# Patient Record
Sex: FEMALE | Race: WHITE | NOT HISPANIC OR LATINO | Employment: OTHER | URBAN - METROPOLITAN AREA
[De-identification: names, ages, dates, MRNs, and addresses within clinical notes are randomized per-mention and may not be internally consistent; named-entity substitution may affect disease eponyms.]

---

## 2017-01-05 ENCOUNTER — TELEPHONE (OUTPATIENT)
Dept: INTERNAL MEDICINE | Facility: CLINIC | Age: 62
End: 2017-01-05

## 2017-01-05 NOTE — TELEPHONE ENCOUNTER
----- Message from Destinee Gallegos sent at 1/5/2017  9:18 AM EST -----  Concerning pts MRI, this was denied by insurance. How would you like me to proceed with this?

## 2017-01-17 ENCOUNTER — APPOINTMENT (OUTPATIENT)
Dept: MRI IMAGING | Facility: HOSPITAL | Age: 62
End: 2017-01-17

## 2017-01-24 ENCOUNTER — TRANSCRIBE ORDERS (OUTPATIENT)
Dept: MAMMOGRAPHY | Facility: HOSPITAL | Age: 62
End: 2017-01-24

## 2017-01-24 DIAGNOSIS — Z12.31 VISIT FOR SCREENING MAMMOGRAM: Primary | ICD-10-CM

## 2017-03-02 ENCOUNTER — APPOINTMENT (OUTPATIENT)
Dept: MAMMOGRAPHY | Facility: HOSPITAL | Age: 62
End: 2017-03-02
Attending: OBSTETRICS & GYNECOLOGY

## 2017-03-06 ENCOUNTER — TRANSCRIBE ORDERS (OUTPATIENT)
Dept: MAMMOGRAPHY | Facility: HOSPITAL | Age: 62
End: 2017-03-06

## 2017-03-06 ENCOUNTER — TRANSCRIBE ORDERS (OUTPATIENT)
Dept: PHARMACY | Facility: HOSPITAL | Age: 62
End: 2017-03-06

## 2017-03-06 DIAGNOSIS — Z13.820 SCREENING FOR OSTEOPOROSIS: Primary | ICD-10-CM

## 2017-03-13 ENCOUNTER — HOSPITAL ENCOUNTER (OUTPATIENT)
Dept: MAMMOGRAPHY | Facility: HOSPITAL | Age: 62
Discharge: HOME | End: 2017-03-13
Attending: OBSTETRICS & GYNECOLOGY | Admitting: OBSTETRICS & GYNECOLOGY

## 2017-03-13 ENCOUNTER — HOSPITAL ENCOUNTER (OUTPATIENT)
Dept: BONE DENSITY | Facility: HOSPITAL | Age: 62
Discharge: HOME | End: 2017-03-13

## 2017-03-13 DIAGNOSIS — Z13.820 SCREENING FOR OSTEOPOROSIS: ICD-10-CM

## 2017-03-13 DIAGNOSIS — Z12.31 VISIT FOR SCREENING MAMMOGRAM: ICD-10-CM

## 2017-03-13 PROCEDURE — 77080 DXA BONE DENSITY AXIAL: CPT | Performed by: RADIOLOGY

## 2017-03-13 PROCEDURE — G0202 SCR MAMMO BI INCL CAD: HCPCS

## 2017-03-13 PROCEDURE — 77063 BREAST TOMOSYNTHESIS BI: CPT

## 2017-03-13 PROCEDURE — 77080 DXA BONE DENSITY AXIAL: CPT

## 2017-03-13 PROCEDURE — 77067 SCR MAMMO BI INCL CAD: CPT | Performed by: RADIOLOGY

## 2017-03-13 PROCEDURE — 77063 BREAST TOMOSYNTHESIS BI: CPT | Performed by: RADIOLOGY

## 2017-03-21 ENCOUNTER — OFFICE VISIT (OUTPATIENT)
Dept: INTERNAL MEDICINE | Facility: CLINIC | Age: 62
End: 2017-03-21

## 2017-03-21 VITALS
TEMPERATURE: 98.3 F | HEART RATE: 68 BPM | BODY MASS INDEX: 28.48 KG/M2 | WEIGHT: 141 LBS | SYSTOLIC BLOOD PRESSURE: 132 MMHG | RESPIRATION RATE: 18 BRPM | DIASTOLIC BLOOD PRESSURE: 78 MMHG

## 2017-03-21 DIAGNOSIS — H65.04 RECURRENT ACUTE SEROUS OTITIS MEDIA OF RIGHT EAR: ICD-10-CM

## 2017-03-21 DIAGNOSIS — I10 BENIGN ESSENTIAL HYPERTENSION: Primary | ICD-10-CM

## 2017-03-21 LAB
ANION GAP SERPL CALCULATED.3IONS-SCNC: 7 MMOL/L (ref 3–11)
BUN BLD-MCNC: 14 MG/DL (ref 9–23)
BUN/CREAT SERPL: 23.3 (ref 7–25)
CALCIUM SPEC-SCNC: 9.9 MG/DL (ref 8.7–10.4)
CHLORIDE SERPL-SCNC: 98 MMOL/L (ref 99–109)
CO2 SERPL-SCNC: 34 MMOL/L (ref 20–31)
CREAT BLD-MCNC: 0.6 MG/DL (ref 0.6–1.3)
GFR SERPL CREATININE-BSD FRML MDRD: 102 ML/MIN/1.73
GLUCOSE BLD-MCNC: 112 MG/DL (ref 70–100)
POTASSIUM BLD-SCNC: 3.5 MMOL/L (ref 3.5–5.5)
SODIUM BLD-SCNC: 139 MMOL/L (ref 132–146)

## 2017-03-21 PROCEDURE — 80048 BASIC METABOLIC PNL TOTAL CA: CPT | Performed by: INTERNAL MEDICINE

## 2017-03-21 PROCEDURE — 99213 OFFICE O/P EST LOW 20 MIN: CPT | Performed by: INTERNAL MEDICINE

## 2017-03-21 PROCEDURE — 36415 COLL VENOUS BLD VENIPUNCTURE: CPT | Performed by: INTERNAL MEDICINE

## 2017-03-21 NOTE — PROGRESS NOTES
Chief Complaint   Patient presents with   • Follow-up     6 MONTH       History of Present Illness      The patient presents for a follow-up related to hypertension. The patient reports that she has had no headaches, chest pain, dyspnea, edema, syncope, blurred vision or palpitations. She states that she is taking her medication as prescribed. She is not having medication side effects.    The patient presents with a 2 week history of right ear pain. There has not been drainage associated with the pain. She does not have wheezing, a cough, a fever, facial pain, eye drainage, nasal discharge, chills, a rash, nausea, vomiting, diarrhea, congestion, decreased appetite, dizziness or a sore throat. The patient does not smoke. The patient has used steroid nasal sprays for treatment of this illness.   The steroid nasal sprays have improved the symptoms.    Review of Systems    GENERAL- Denies Unexplained Weight Loss, Sweats or Fatigue.    HEENT- Reports: Ear Pain. Denies: Eye Pain, Decreased Vision, Sinus Pain or Decreased Hearing.    PULMONARY- Denies Sputum Production.    Medications      Current Outpatient Prescriptions:   •  BIOTIN PO, Take 1 tablet by mouth daily., Disp: , Rfl:   •  estrogen, conjugated,-medroxyprogesterone (PREMPRO) 0.45-1.5 MG per tablet, Take 1 tablet by mouth daily., Disp: , Rfl:   •  JUBLIA 10 % solution, Apply 1 application topically daily., Disp: , Rfl: 3  •  losartan-hydrochlorothiazide (HYZAAR) 100-25 MG per tablet, TAKE ONE TABLET BY MOUTH DAILY, Disp: 30 tablet, Rfl: 5  •  pantoprazole (PROTONIX) 40 MG EC tablet, TAKE ONE TABLET BY MOUTH DAILY, Disp: 30 tablet, Rfl: 5     Allergies    No Known Allergies    Problem List    Patient Active Problem List   Diagnosis   • Benign essential hypertension   • Hearing loss   • Benign paroxysmal positional vertigo   • Influenza       Physical Examination    Visit Vitals   • /78 (BP Location: Left arm, Patient Position: Sitting, Cuff Size: Adult)    • Pulse 68   • Temp 98.3 °F (36.8 °C) (Temporal Artery )   • Resp 18   • Wt 141 lb (64 kg)   • BMI 28.48 kg/m2       HEENT: Head- Normocephalic Atraumatic. Facies- Within normal limits. Pinnas- Normal texture and shape bilaterally. Canals- Normal bilaterally. TMs- Left TM is normal; The right TM is noted to have serous fluid. Nares- Patent bilaterally. Nasal Septum- is normal. There is no tenderness to palpation over the frontal or maxillary sinuses. Lids- Normal bilaterally. Conjunctiva- Clear bilaterally. Sclera- Anicteric bilaterally. Oropharynx- Moist with no lesions. Tonsils- No enlargement, erythema or exudate.    Neck: Thyroid- non enlarged, symmetric and has no nodules. No bruits are detected.    Lymph Nodes: Cervical- no enlarged lymph nodes noted. Clavicular- Deferred. Axillary- Deferred. Inguinal- Deferred.    Lungs: Auscultation- Clear to auscultation bilaterally. There are no retractions, clubbing or cyanosis. The Expiratory to Inspiratory ratio is equal.    Cardiovascular: Heart- Normal Rate with Regular rhythm and no murmurs.    Impression and Assessment    Hypertension.     Serous Otitis Media.     Plan    Hypertension Plan: The current plan was continued.    Serous Otitis Media Plan: She was told to use saline nasal drops for relief of congestion. Continue the current medication regimen.    Helga was seen today for follow-up.    Diagnoses and all orders for this visit:    Benign essential hypertension  -     Basic Metabolic Panel    Recurrent acute serous otitis media of right ear        Return to Office    The patient was instructed to return for follow-up in 6 months. Next Visit: Physical.    The patient was instructed to return sooner if the condition changes, worsens, or doesn't resolve.

## 2017-04-04 ENCOUNTER — HOSPITAL ENCOUNTER (OUTPATIENT)
Dept: MAMMOGRAPHY | Facility: HOSPITAL | Age: 62
Discharge: HOME | End: 2017-04-04
Admitting: OBSTETRICS & GYNECOLOGY

## 2017-04-04 DIAGNOSIS — R92.8 ABNORMAL MAMMOGRAM: ICD-10-CM

## 2017-04-04 PROCEDURE — G0206 DX MAMMO INCL CAD UNI: HCPCS

## 2017-04-04 PROCEDURE — 77065 DX MAMMO INCL CAD UNI: CPT | Performed by: RADIOLOGY

## 2017-04-04 PROCEDURE — 77061 BREAST TOMOSYNTHESIS UNI: CPT | Performed by: RADIOLOGY

## 2017-04-04 PROCEDURE — G0279 TOMOSYNTHESIS, MAMMO: HCPCS

## 2017-05-03 DIAGNOSIS — I10 BENIGN ESSENTIAL HYPERTENSION: ICD-10-CM

## 2017-05-03 RX ORDER — LOSARTAN POTASSIUM AND HYDROCHLOROTHIAZIDE 25; 100 MG/1; MG/1
TABLET ORAL
Qty: 30 TABLET | Refills: 4 | Status: SHIPPED | OUTPATIENT
Start: 2017-05-03 | End: 2017-10-03 | Stop reason: SDUPTHER

## 2017-07-05 RX ORDER — PANTOPRAZOLE SODIUM 40 MG/1
TABLET, DELAYED RELEASE ORAL
Qty: 30 TABLET | Refills: 5 | Status: SHIPPED | OUTPATIENT
Start: 2017-07-05 | End: 2017-12-30 | Stop reason: SDUPTHER

## 2017-09-05 ENCOUNTER — TELEPHONE (OUTPATIENT)
Dept: INTERNAL MEDICINE | Facility: CLINIC | Age: 62
End: 2017-09-05

## 2017-09-05 NOTE — TELEPHONE ENCOUNTER
----- Message from Venita Flowers sent at 8/29/2017  3:32 PM EDT -----  PT CALLED AND STATED SHE IS GOING TO [place] AND [facility] IN JANUARY AND WANTED TO KNOW WHAT VACCINES DOES SHE NEED.     SHE CAN BE REACHED [phone]

## 2017-09-05 NOTE — TELEPHONE ENCOUNTER
She will need Hep A, Hep B, Typhoid, Tdap vaccinations and malaria prophylaxis.  Yellow fever vaccine is also recommended for most areas of [place].  To receive this, she will need to go to a certified travel health center.  Please give her the name of the travel medicine clinic in [place] and a copy of the vaccinations that she has received here.  Rey Desouza MD  4:22 PM  09/05/17

## 2017-09-07 NOTE — TELEPHONE ENCOUNTER
I spoke to pt and told her about travel clinic. She said she is coming into office in October and will get this info to schedule appt at travel clinic then. She was not able to write anything down now, but said she does not need this until January.      Travel clinic at  p. [phone]  Address: [facility] [address]  [address]

## 2017-10-03 DIAGNOSIS — I10 BENIGN ESSENTIAL HYPERTENSION: ICD-10-CM

## 2017-10-04 RX ORDER — LOSARTAN POTASSIUM AND HYDROCHLOROTHIAZIDE 25; 100 MG/1; MG/1
TABLET ORAL
Qty: 30 TABLET | Refills: 3 | Status: SHIPPED | OUTPATIENT
Start: 2017-10-04 | End: 2018-01-04 | Stop reason: SDUPTHER

## 2017-10-12 ENCOUNTER — OFFICE VISIT (OUTPATIENT)
Dept: INTERNAL MEDICINE | Facility: CLINIC | Age: 62
End: 2017-10-12

## 2017-10-12 VITALS
SYSTOLIC BLOOD PRESSURE: 134 MMHG | BODY MASS INDEX: 27.21 KG/M2 | HEART RATE: 60 BPM | HEIGHT: 59 IN | RESPIRATION RATE: 16 BRPM | WEIGHT: 135 LBS | DIASTOLIC BLOOD PRESSURE: 84 MMHG | TEMPERATURE: 97.9 F

## 2017-10-12 DIAGNOSIS — Z23 IMMUNIZATION DUE: ICD-10-CM

## 2017-10-12 DIAGNOSIS — Z00.00 PHYSICAL EXAM: Primary | ICD-10-CM

## 2017-10-12 DIAGNOSIS — I10 ESSENTIAL HYPERTENSION: ICD-10-CM

## 2017-10-12 DIAGNOSIS — K21.9 GASTROESOPHAGEAL REFLUX DISEASE WITHOUT ESOPHAGITIS: ICD-10-CM

## 2017-10-12 LAB
ALBUMIN SERPL-MCNC: 4.2 G/DL (ref 3.2–4.8)
ALBUMIN/GLOB SERPL: 1.7 G/DL (ref 1.5–2.5)
ALP SERPL-CCNC: 55 U/L (ref 25–100)
ALT SERPL W P-5'-P-CCNC: 28 U/L (ref 7–40)
ANION GAP SERPL CALCULATED.3IONS-SCNC: 7 MMOL/L (ref 3–11)
ARTICHOKE IGE QN: 66 MG/DL (ref 0–130)
AST SERPL-CCNC: 31 U/L (ref 0–33)
BASOPHILS # BLD AUTO: 0.03 10*3/MM3 (ref 0–0.2)
BASOPHILS NFR BLD AUTO: 0.6 % (ref 0–1)
BILIRUB BLD-MCNC: NEGATIVE MG/DL
BILIRUB SERPL-MCNC: 0.5 MG/DL (ref 0.3–1.2)
BUN BLD-MCNC: 7 MG/DL (ref 9–23)
BUN/CREAT SERPL: 11.7 (ref 7–25)
CALCIUM SPEC-SCNC: 9.5 MG/DL (ref 8.7–10.4)
CHLORIDE SERPL-SCNC: 90 MMOL/L (ref 99–109)
CHOLEST SERPL-MCNC: 143 MG/DL (ref 0–200)
CLARITY, POC: CLEAR
CO2 SERPL-SCNC: 33 MMOL/L (ref 20–31)
COLOR UR: YELLOW
CREAT BLD-MCNC: 0.6 MG/DL (ref 0.6–1.3)
DEPRECATED RDW RBC AUTO: 43.1 FL (ref 37–54)
EOSINOPHIL # BLD AUTO: 0.13 10*3/MM3 (ref 0–0.3)
EOSINOPHIL NFR BLD AUTO: 2.6 % (ref 0–3)
ERYTHROCYTE [DISTWIDTH] IN BLOOD BY AUTOMATED COUNT: 12.8 % (ref 11.3–14.5)
EXPIRATION DATE: ABNORMAL
GFR SERPL CREATININE-BSD FRML MDRD: 102 ML/MIN/1.73
GLOBULIN UR ELPH-MCNC: 2.5 GM/DL
GLUCOSE BLD-MCNC: 91 MG/DL (ref 70–100)
GLUCOSE UR STRIP-MCNC: NEGATIVE MG/DL
HCT VFR BLD AUTO: 40.4 % (ref 34.5–44)
HCV AB SER DONR QL: NORMAL
HDLC SERPL-MCNC: 64 MG/DL (ref 40–60)
HGB BLD-MCNC: 13.6 G/DL (ref 11.5–15.5)
IMM GRANULOCYTES # BLD: 0.02 10*3/MM3 (ref 0–0.03)
IMM GRANULOCYTES NFR BLD: 0.4 % (ref 0–0.6)
KETONES UR QL: ABNORMAL
LEUKOCYTE EST, POC: NEGATIVE
LYMPHOCYTES # BLD AUTO: 1.22 10*3/MM3 (ref 0.6–4.8)
LYMPHOCYTES NFR BLD AUTO: 24.6 % (ref 24–44)
Lab: ABNORMAL
MCH RBC QN AUTO: 30.9 PG (ref 27–31)
MCHC RBC AUTO-ENTMCNC: 33.7 G/DL (ref 32–36)
MCV RBC AUTO: 91.8 FL (ref 80–99)
MONOCYTES # BLD AUTO: 0.53 10*3/MM3 (ref 0–1)
MONOCYTES NFR BLD AUTO: 10.7 % (ref 0–12)
NEUTROPHILS # BLD AUTO: 3.03 10*3/MM3 (ref 1.5–8.3)
NEUTROPHILS NFR BLD AUTO: 61.1 % (ref 41–71)
NITRITE UR-MCNC: NEGATIVE MG/ML
PH UR: 6 [PH] (ref 5–8)
PLATELET # BLD AUTO: 286 10*3/MM3 (ref 150–450)
PMV BLD AUTO: 9.1 FL (ref 6–12)
POTASSIUM BLD-SCNC: 4.4 MMOL/L (ref 3.5–5.5)
PROT SERPL-MCNC: 6.7 G/DL (ref 5.7–8.2)
PROT UR STRIP-MCNC: NEGATIVE MG/DL
RBC # BLD AUTO: 4.4 10*6/MM3 (ref 3.89–5.14)
RBC # UR STRIP: NEGATIVE /UL
SODIUM BLD-SCNC: 130 MMOL/L (ref 132–146)
SP GR UR: 1.01 (ref 1–1.03)
TRIGL SERPL-MCNC: 41 MG/DL (ref 0–150)
UROBILINOGEN UR QL: NORMAL
WBC NRBC COR # BLD: 4.96 10*3/MM3 (ref 3.5–10.8)

## 2017-10-12 PROCEDURE — 86803 HEPATITIS C AB TEST: CPT | Performed by: INTERNAL MEDICINE

## 2017-10-12 PROCEDURE — 90746 HEPB VACCINE 3 DOSE ADULT IM: CPT | Performed by: INTERNAL MEDICINE

## 2017-10-12 PROCEDURE — 99396 PREV VISIT EST AGE 40-64: CPT | Performed by: INTERNAL MEDICINE

## 2017-10-12 PROCEDURE — 85025 COMPLETE CBC W/AUTO DIFF WBC: CPT | Performed by: INTERNAL MEDICINE

## 2017-10-12 PROCEDURE — 81003 URINALYSIS AUTO W/O SCOPE: CPT | Performed by: INTERNAL MEDICINE

## 2017-10-12 PROCEDURE — 80053 COMPREHEN METABOLIC PANEL: CPT | Performed by: INTERNAL MEDICINE

## 2017-10-12 PROCEDURE — 80061 LIPID PANEL: CPT | Performed by: INTERNAL MEDICINE

## 2017-10-12 PROCEDURE — 90471 IMMUNIZATION ADMIN: CPT | Performed by: INTERNAL MEDICINE

## 2017-10-12 PROCEDURE — 90632 HEPA VACCINE ADULT IM: CPT | Performed by: INTERNAL MEDICINE

## 2017-10-12 PROCEDURE — 36415 COLL VENOUS BLD VENIPUNCTURE: CPT | Performed by: INTERNAL MEDICINE

## 2017-10-12 PROCEDURE — 90472 IMMUNIZATION ADMIN EACH ADD: CPT | Performed by: INTERNAL MEDICINE

## 2017-10-12 NOTE — PROGRESS NOTES
Chief Complaint   Patient presents with   • Annual Exam       History of Present Illness      The patient presents for an established patient physical examination and health maintenance visit. The patient has had a colonoscopy.    The patient presents for a follow-up related to hypertension. The patient reports that she has had no headaches, chest pain, dyspnea, edema, syncope, blurred vision or palpitations. She states that she is taking her medication as prescribed. She is not having medication side effects. She checks her blood pressures at home. The home readings are normal.    The patient is on pantoprazole for her gastroesophageal reflux. The medication is taken on a regular basis and gives complete relief of the symptoms. She reports no abdominal pain, belching, diarrhea, dysphagia, early satiety, heartburn, hoarseness, nausea, odynophagia, rectal bleeding, vomiting or weight loss. The GERD has no known aggravating factors.    Review of Systems    GENERAL- Denies Fever, Chills, Sweats, Fatigue, Weakness or Malaise.    HEENT- Denies Eye Pain, Eye Drainage, Nasal Discharge, Sore Throat, Ear Pain, Ear Drainage, Alopecia, Decreased Vision, Sinus Pain, Nasal Congestion, Decreased Hearing, Visual Disturbance, Diplopia or Tinnitus.    NECK- Denies Decreased Range of Motion, Stiffness, Thyroid Nodules, Enlarged Lymph Nodes or Goiter.    CARDIOVASCULAR- Denies Claudication.    PULMONARY- Denies Wheezing, Sputum Production, Cough, Hemoptysis or Pleuritic Chest Pain.    GASTROINTESTINAL- Denies Constipation.    GENITOURINARY- Denies Dysuria, Hematuria, Urinary Frequency, Urinary Leakage, Pelvic Pain, Vaginal Prolapse, Vaginal Discharge, Dyspareunia or Abnormal Menses.    ENDOCRINE- Denies Cold Intolerance, Depression, Heat Intolerance, Memory Loss, Polydypsia, Polyphagia, Polyuria, Sleep Disturbance or Weight Gain.    NEUROLOGIC- Denies Seizures, Confusion or Excessive Daytime Sleepiness.    MUSCULOSKELETAL- Denies  "Joint Pain, Joint Stiffness, Decreased Range of Motion, Joint Swelling or Erythema of Joints.    INTEGUMENTARY- Denies Rash, Lumps, Sores, Itching, Dryness, Color Change, Changes in Hair, Brittle Nails, Discolored Nails, Thickened Nails or Growths.    Medications      Current Outpatient Prescriptions:   •  BIOTIN PO, Take 1 tablet by mouth daily., Disp: , Rfl:   •  Cholecalciferol (VITAMIN D-3 PO), Take 1 capsule by mouth 1 (One) Time Per Week. TAKES 50,000 UNITS WEEKLY, Disp: , Rfl:   •  estrogen, conjugated,-medroxyprogesterone (PREMPRO) 0.45-1.5 MG per tablet, Take 1 tablet by mouth daily., Disp: , Rfl:   •  JUBLIA 10 % solution, Apply 1 application topically daily., Disp: , Rfl: 3  •  losartan-hydrochlorothiazide (HYZAAR) 100-25 MG per tablet, TAKE ONE TABLET BY MOUTH DAILY, Disp: 30 tablet, Rfl: 3  •  pantoprazole (PROTONIX) 40 MG EC tablet, TAKE ONE TABLET BY MOUTH DAILY, Disp: 30 tablet, Rfl: 5     Allergies    No Known Allergies    Problem List    Patient Active Problem List   Diagnosis   • Benign essential hypertension   • Hearing loss   • Benign paroxysmal positional vertigo   • Influenza       Medications, Allergies, Problems List and Past History were reviewed and updated.    Physical Examination    /84 (BP Location: Right arm, Patient Position: Sitting, Cuff Size: Adult)  Pulse 60  Temp 97.9 °F (36.6 °C) (Temporal Artery )   Resp 16  Ht 59\" (149.9 cm)  Wt 135 lb (61.2 kg)  LMP  (LMP Unknown) Comment: LAST PAP 1/2017 BY DR DUARTE  Breastfeeding? No  BMI 27.27 kg/m2    HEENT: Head- Normocephalic Atraumatic. Facies- Within normal limits. Pinnas- Normal texture and shape bilaterally. Canals- Normal bilaterally. TMs- Normal bilaterally. Nares- Patent bilaterally. Nasal Septum- is normal. There is no tenderness to palpation over the frontal or maxillary sinuses. Lids- Normal bilaterally. Conjunctiva- Clear bilaterally. Sclera- Anicteric bilaterally. Oropharynx- Moist with no lesions. Tonsils- No " enlargement, erythema or exudate.    Neck: Thyroid- non enlarged, symmetric and has no nodules. No bruits are detected. ROM- Normal Range of Motion with no rigidity.    Lymph Nodes: Cervical- no enlarged lymph nodes noted. Clavicular- Deferred. Axillary- Deferred. Inguinal- Deferred.    Lungs: Auscultation- Clear to auscultation bilaterally. There are no retractions, clubbing or cyanosis. The Expiratory to Inspiratory ratio is equal.    Cardiovascular: There are no carotid bruits. Heart- Normal Rate with Regular rhythm and no murmurs. There are no gallops. There are no rubs. In the lower extremities there is no edema. The upper extremities do not have edema.    Abdomen: Soft, benign, non-tender with no masses, hernias, organomegaly or scars.    Breast: Normal contours bilaterally with no masses, discharge, skin changes or lumps. There are no scars noted.    The patient has no worrisome or suspicious skin lesions noted.    Impression and Assessment    Normal Physical Examination.    Essential Hypertension.    Gastroesophageal Reflux Disease.    Plan    Gastroesophageal Reflux Disease Plan: The current plan was continued.    Essential Hypertension Plan: The current plan was continued.    Counseling was provided regarding: Dental Visits, Flossing Teeth, Heart Healthy Diet and Seat Belt Utilization.    The following was ordered for screening and health maintenance: Hepatitis C Antibody, Lipid Profile and U/A.    Counseled regarding immunizations and applicable VIS given.    Immunizations Ordered and Administered: Hepatitis A, Hepatitis B and Oral Typhoid Vaccine due to upcoming travel.    Helga was seen today for annual exam.    Diagnoses and all orders for this visit:    Physical exam  -     Comprehensive Metabolic Panel  -     Lipid Panel  -     Hepatitis C Antibody  -     POC Urinalysis Dipstick, Automated  -     Hepatitis A Vaccine Adult IM  -     Hepatitis B Vaccine Adult IM    Essential hypertension  -      Comprehensive Metabolic Panel  -     Lipid Panel  -     POC Urinalysis Dipstick, Automated    Gastroesophageal reflux disease without esophagitis  -     Comprehensive Metabolic Panel  -     CBC & Differential    Immunization due  -     Hepatitis A Vaccine Adult IM  -     Hepatitis B Vaccine Adult IM  -     typhoid (VIVOTIF) DR capsule; Take 1 capsule by mouth Every Other Day for 8 days.        Return to Office    The patient was instructed to return for follow-up in 6 months. Next Visit: Follow-up.    The patient was instructed to return sooner if the condition changes, worsens, or doesn't resolve.

## 2017-12-14 ENCOUNTER — CLINICAL SUPPORT (OUTPATIENT)
Dept: INTERNAL MEDICINE | Facility: CLINIC | Age: 62
End: 2017-12-14

## 2017-12-14 DIAGNOSIS — Z23 NEED FOR HEPATITIS B VACCINATION: Primary | ICD-10-CM

## 2017-12-14 PROCEDURE — 90746 HEPB VACCINE 3 DOSE ADULT IM: CPT | Performed by: INTERNAL MEDICINE

## 2017-12-14 PROCEDURE — 90471 IMMUNIZATION ADMIN: CPT | Performed by: INTERNAL MEDICINE

## 2018-01-02 RX ORDER — PANTOPRAZOLE SODIUM 40 MG/1
TABLET, DELAYED RELEASE ORAL
Qty: 30 TABLET | Refills: 4 | Status: SHIPPED | OUTPATIENT
Start: 2018-01-02 | End: 2018-06-11 | Stop reason: SDUPTHER

## 2018-01-04 DIAGNOSIS — I10 BENIGN ESSENTIAL HYPERTENSION: ICD-10-CM

## 2018-01-04 RX ORDER — LOSARTAN POTASSIUM AND HYDROCHLOROTHIAZIDE 25; 100 MG/1; MG/1
TABLET ORAL
Qty: 30 TABLET | Refills: 2 | Status: SHIPPED | OUTPATIENT
Start: 2018-01-04 | End: 2018-05-09 | Stop reason: SDUPTHER

## 2018-02-23 ENCOUNTER — TRANSCRIBE ORDERS (OUTPATIENT)
Dept: ADMINISTRATIVE | Facility: HOSPITAL | Age: 63
End: 2018-02-23

## 2018-02-23 DIAGNOSIS — Z12.31 VISIT FOR SCREENING MAMMOGRAM: Primary | ICD-10-CM

## 2018-03-27 ENCOUNTER — APPOINTMENT (OUTPATIENT)
Dept: MAMMOGRAPHY | Facility: HOSPITAL | Age: 63
End: 2018-03-27
Attending: OBSTETRICS & GYNECOLOGY

## 2018-04-18 ENCOUNTER — TELEPHONE (OUTPATIENT)
Dept: INTERNAL MEDICINE | Facility: CLINIC | Age: 63
End: 2018-04-18

## 2018-04-18 NOTE — TELEPHONE ENCOUNTER
----- Message from Elicia Hanks V sent at 4/18/2018  8:27 AM EDT -----  PT CALLED TO REMIND YOU THAT SHE NEEDED THE LAST OF HER HEP A &B VACCINE ON HER APPT ON 4/26    PT CAN BE REACHED -[phone]

## 2018-04-26 ENCOUNTER — HOSPITAL ENCOUNTER (OUTPATIENT)
Dept: MAMMOGRAPHY | Facility: HOSPITAL | Age: 63
Discharge: HOME | End: 2018-04-26
Attending: OBSTETRICS & GYNECOLOGY | Admitting: OBSTETRICS & GYNECOLOGY

## 2018-04-26 ENCOUNTER — OFFICE VISIT (OUTPATIENT)
Dept: INTERNAL MEDICINE | Facility: CLINIC | Age: 63
End: 2018-04-26

## 2018-04-26 VITALS
DIASTOLIC BLOOD PRESSURE: 80 MMHG | RESPIRATION RATE: 16 BRPM | SYSTOLIC BLOOD PRESSURE: 132 MMHG | HEART RATE: 64 BPM | BODY MASS INDEX: 27.47 KG/M2 | WEIGHT: 136 LBS | TEMPERATURE: 98.5 F

## 2018-04-26 DIAGNOSIS — Z12.31 VISIT FOR SCREENING MAMMOGRAM: ICD-10-CM

## 2018-04-26 DIAGNOSIS — Z23 IMMUNIZATION DUE: ICD-10-CM

## 2018-04-26 DIAGNOSIS — K21.9 GASTROESOPHAGEAL REFLUX DISEASE WITHOUT ESOPHAGITIS: ICD-10-CM

## 2018-04-26 DIAGNOSIS — Z12.11 SCREEN FOR COLON CANCER: ICD-10-CM

## 2018-04-26 DIAGNOSIS — I10 BENIGN ESSENTIAL HYPERTENSION: Primary | ICD-10-CM

## 2018-04-26 LAB
ANION GAP SERPL CALCULATED.3IONS-SCNC: 8 MMOL/L (ref 3–11)
BUN BLD-MCNC: 15 MG/DL (ref 9–23)
BUN/CREAT SERPL: 25 (ref 7–25)
CALCIUM SPEC-SCNC: 9.6 MG/DL (ref 8.7–10.4)
CHLORIDE SERPL-SCNC: 92 MMOL/L (ref 99–109)
CO2 SERPL-SCNC: 33 MMOL/L (ref 20–31)
CREAT BLD-MCNC: 0.6 MG/DL (ref 0.6–1.3)
GFR SERPL CREATININE-BSD FRML MDRD: 102 ML/MIN/1.73
GLUCOSE BLD-MCNC: 102 MG/DL (ref 70–100)
POTASSIUM BLD-SCNC: 3.7 MMOL/L (ref 3.5–5.5)
SODIUM BLD-SCNC: 133 MMOL/L (ref 132–146)

## 2018-04-26 PROCEDURE — 77067 SCR MAMMO BI INCL CAD: CPT

## 2018-04-26 PROCEDURE — 90746 HEPB VACCINE 3 DOSE ADULT IM: CPT | Performed by: INTERNAL MEDICINE

## 2018-04-26 PROCEDURE — 36415 COLL VENOUS BLD VENIPUNCTURE: CPT | Performed by: INTERNAL MEDICINE

## 2018-04-26 PROCEDURE — 77067 SCR MAMMO BI INCL CAD: CPT | Performed by: RADIOLOGY

## 2018-04-26 PROCEDURE — 77063 BREAST TOMOSYNTHESIS BI: CPT

## 2018-04-26 PROCEDURE — 90632 HEPA VACCINE ADULT IM: CPT | Performed by: INTERNAL MEDICINE

## 2018-04-26 PROCEDURE — 77063 BREAST TOMOSYNTHESIS BI: CPT | Performed by: RADIOLOGY

## 2018-04-26 PROCEDURE — 90472 IMMUNIZATION ADMIN EACH ADD: CPT | Performed by: INTERNAL MEDICINE

## 2018-04-26 PROCEDURE — 99213 OFFICE O/P EST LOW 20 MIN: CPT | Performed by: INTERNAL MEDICINE

## 2018-04-26 PROCEDURE — 90471 IMMUNIZATION ADMIN: CPT | Performed by: INTERNAL MEDICINE

## 2018-04-26 PROCEDURE — 80048 BASIC METABOLIC PNL TOTAL CA: CPT | Performed by: INTERNAL MEDICINE

## 2018-04-26 RX ORDER — MULTIVIT WITH MINERALS/LUTEIN
1000 TABLET ORAL DAILY
COMMUNITY
End: 2019-03-11

## 2018-04-26 NOTE — PROGRESS NOTES
Chief Complaint   Patient presents with   • Follow-up     6 MONTH FOLLOW UP CHRONIC MEDICAL PROBLEMS       History of Present Illness      The patient presents for a follow-up related to hypertension. The patient reports that she has had no headaches, chest pain, dyspnea, edema, syncope, blurred vision or palpitations. She states that she is taking her medication as prescribed. She is not having medication side effects. She does not check her blood pressures at home.    The patient is on pantoprazole for her gastroesophageal reflux. The medication is taken on a regular basis and gives complete relief of the symptoms. She reports no abdominal pain, belching, diarrhea, dysphagia, early satiety, heartburn, hoarseness, nausea, odynophagia, rectal bleeding, vomiting or weight loss. The GERD has no known aggravating factors.    Review of Systems    CARDIOVASCULAR- Denies Claudication.    PULMONARY- Denies Wheezing, Sputum Production, Cough, Hemoptysis or Pleuritic Chest Pain.    Medications      Current Outpatient Prescriptions:   •  ascorbic acid (VITAMIN C) 1000 MG tablet, Take 1,000 mg by mouth Daily., Disp: , Rfl:   •  BIOTIN PO, Take 1 tablet by mouth daily., Disp: , Rfl:   •  Cholecalciferol (VITAMIN D-3 PO), Take 1 capsule by mouth 1 (One) Time Per Week. TAKES 50,000 UNITS WEEKLY, Disp: , Rfl:   •  estrogen, conjugated,-medroxyprogesterone (PREMPRO) 0.45-1.5 MG per tablet, Take 1 tablet by mouth daily., Disp: , Rfl:   •  losartan-hydrochlorothiazide (HYZAAR) 100-25 MG per tablet, TAKE ONE TABLET BY MOUTH DAILY, Disp: 30 tablet, Rfl: 2  •  pantoprazole (PROTONIX) 40 MG EC tablet, TAKE ONE TABLET BY MOUTH DAILY, Disp: 30 tablet, Rfl: 4     Allergies    No Known Allergies    Problem List    Patient Active Problem List   Diagnosis   • Benign essential hypertension   • Hearing loss   • Benign paroxysmal positional vertigo   • Influenza       Medications, Allergies, Problems List and Past History were reviewed and  updated.    Physical Examination    /80 (BP Location: Left arm, Patient Position: Sitting, Cuff Size: Adult)   Pulse 64   Temp 98.5 °F (36.9 °C) (Temporal Artery )   Resp 16   Wt 61.7 kg (136 lb)   LMP  (LMP Unknown) Comment: LAST PAP 3/5/18 BY DR DUARTE  BMI 27.47 kg/m²     HEENT: Head- Normocephalic Atraumatic. Facies- Within normal limits. Pinnas- Normal texture and shape bilaterally. Canals- Normal bilaterally. TMs- Normal bilaterally. Nares- Patent bilaterally. Nasal Septum- is normal. There is no tenderness to palpation over the frontal or maxillary sinuses. Lids- Normal bilaterally. Conjunctiva- Clear bilaterally. Sclera- Anicteric bilaterally. Oropharynx- Moist with no lesions. Tonsils- No enlargement, erythema or exudate.    Neck: Thyroid- non enlarged, symmetric and has no nodules. No bruits are detected. ROM- Normal Range of Motion with no rigidity.    Lungs: Auscultation- Clear to auscultation bilaterally. There are no retractions, clubbing or cyanosis. The Expiratory to Inspiratory ratio is equal.    Cardiovascular: There are no carotid bruits. Heart- Normal Rate with Regular rhythm and no murmurs. There are no gallops. There are no rubs. In the lower extremities there is no edema. The upper extremities do not have edema.    Abdomen: Soft, benign, non-tender with no masses, hernias, organomegaly or scars.    Impression and Assessment    Encounter for Immunization Administration.    Essential Hypertension.    Gastroesophageal Reflux Disease.    Plan    Gastroesophageal Reflux Disease Plan: The current plan was continued.    Essential Hypertension Plan: The current plan was continued.    The following was ordered for screening and health maintenance: Colonoscopy.    Counseled regarding immunizations and applicable VIS given.    Immunizations Ordered and Administered: Hepatitis A and Hepatitis B.    Helga was seen today for follow-up.    Diagnoses and all orders for this visit:    Benign  essential hypertension  -     Basic Metabolic Panel    Gastroesophageal reflux disease without esophagitis    Screen for colon cancer  -     Amb referral for Screening Colonoscopy    Immunization due  -     Hepatitis A Vaccine Adult IM  -     Hepatitis B Vaccine Adult IM        Return to Office    The patient was instructed to return for follow-up in 6 months. Next Visit: Physical.    The patient was instructed to return sooner if the condition changes, worsens, or doesn't resolve.

## 2018-05-01 ENCOUNTER — TELEPHONE (OUTPATIENT)
Dept: INTERNAL MEDICINE | Facility: CLINIC | Age: 63
End: 2018-05-01

## 2018-05-01 NOTE — TELEPHONE ENCOUNTER
----- Message from Destinee Gallegos sent at 5/1/2018 11:53 AM EDT -----  pt not due for colonoscopy until jan 2019, they will contact pt closer to.  Is this referral okay to cancel for now?

## 2018-05-01 NOTE — TELEPHONE ENCOUNTER
OK to cancel but we need a copy of her last colonoscopy.  Please obtain and have it scanned into our chart.  Please let patient know that she isn't due.  Rey Desouza MD  1:44 PM  05/01/18

## 2018-05-07 ENCOUNTER — HOSPITAL ENCOUNTER (OUTPATIENT)
Dept: MAMMOGRAPHY | Facility: HOSPITAL | Age: 63
Discharge: HOME | End: 2018-05-07
Admitting: OBSTETRICS & GYNECOLOGY

## 2018-05-07 DIAGNOSIS — R92.8 ABNORMAL MAMMOGRAM: ICD-10-CM

## 2018-05-07 PROCEDURE — 77066 DX MAMMO INCL CAD BI: CPT | Performed by: RADIOLOGY

## 2018-05-07 PROCEDURE — 77062 BREAST TOMOSYNTHESIS BI: CPT | Performed by: RADIOLOGY

## 2018-05-07 PROCEDURE — G0279 TOMOSYNTHESIS, MAMMO: HCPCS

## 2018-05-07 PROCEDURE — 77066 DX MAMMO INCL CAD BI: CPT

## 2018-05-09 DIAGNOSIS — I10 BENIGN ESSENTIAL HYPERTENSION: ICD-10-CM

## 2018-05-09 RX ORDER — LOSARTAN POTASSIUM AND HYDROCHLOROTHIAZIDE 25; 100 MG/1; MG/1
TABLET ORAL
Qty: 90 TABLET | Refills: 1 | Status: SHIPPED | OUTPATIENT
Start: 2018-05-09 | End: 2018-11-14 | Stop reason: SDUPTHER

## 2018-06-11 RX ORDER — PANTOPRAZOLE SODIUM 40 MG/1
TABLET, DELAYED RELEASE ORAL
Qty: 30 TABLET | Refills: 3 | Status: SHIPPED | OUTPATIENT
Start: 2018-06-11 | End: 2018-10-15 | Stop reason: SDUPTHER

## 2018-06-18 ENCOUNTER — OFFICE VISIT (OUTPATIENT)
Dept: INTERNAL MEDICINE | Facility: CLINIC | Age: 63
End: 2018-06-18

## 2018-06-18 VITALS
RESPIRATION RATE: 18 BRPM | DIASTOLIC BLOOD PRESSURE: 81 MMHG | TEMPERATURE: 98.7 F | BODY MASS INDEX: 26.35 KG/M2 | SYSTOLIC BLOOD PRESSURE: 124 MMHG | HEIGHT: 60 IN | HEART RATE: 84 BPM | WEIGHT: 134.2 LBS

## 2018-06-18 DIAGNOSIS — H92.01 OTALGIA, RIGHT: Primary | ICD-10-CM

## 2018-06-18 PROCEDURE — 99213 OFFICE O/P EST LOW 20 MIN: CPT | Performed by: NURSE PRACTITIONER

## 2018-06-18 RX ORDER — TRIAMCINOLONE ACETONIDE 55 UG/1
2 SPRAY, METERED NASAL DAILY PRN
COMMUNITY

## 2018-06-18 RX ORDER — PREDNISONE 20 MG/1
20 TABLET ORAL 2 TIMES DAILY
Qty: 10 TABLET | Refills: 0 | Status: SHIPPED | OUTPATIENT
Start: 2018-06-18 | End: 2018-06-23

## 2018-06-18 NOTE — PROGRESS NOTES
Subjective:    Helga Cardenas is a 62 y.o. female.     Chief Complaint   Patient presents with   • Earache     right earache x2 weeks        History of Present Illness   Patient complains of right ear pain x 2 weeks-intermittent pain. Suspects it is fluid. She recently flew to [place] which triggered these symptoms. States chronic issues with right ear with history of tube that was expelled. She will be flying again soon and wants to make sure no fluid is in ear. Pain rated at 5/0-10 scale-sharp at times. She uses nasal saline rinse and Nasacort for allergies/congestion. Reports she equalizes pressure in ears throughout the day by holding nose and blowing.    Current Outpatient Prescriptions:   •  ascorbic acid (VITAMIN C) 1000 MG tablet, Take 1,000 mg by mouth Daily., Disp: , Rfl:   •  BIOTIN PO, Take 1 tablet by mouth daily., Disp: , Rfl:   •  Cholecalciferol (VITAMIN D-3 PO), Take 1 capsule by mouth 1 (One) Time Per Week. TAKES 50,000 UNITS WEEKLY, Disp: , Rfl:   •  estrogen, conjugated,-medroxyprogesterone (PREMPRO) 0.45-1.5 MG per tablet, Take 1 tablet by mouth daily., Disp: , Rfl:   •  losartan-hydrochlorothiazide (HYZAAR) 100-25 MG per tablet, TAKE ONE TABLET BY MOUTH DAILY, Disp: 90 tablet, Rfl: 1  •  pantoprazole (PROTONIX) 40 MG EC tablet, TAKE ONE TABLET BY MOUTH DAILY, Disp: 30 tablet, Rfl: 3  •  Triamcinolone Acetonide (NASACORT) 55 MCG/ACT nasal inhaler, 2 sprays into each nostril Daily., Disp: , Rfl:   •  predniSONE (DELTASONE) 20 MG tablet, Take 1 tablet by mouth 2 (Two) Times a Day for 5 days., Disp: 10 tablet, Rfl: 0     The following portions of the patient's history were reviewed and updated as appropriate: allergies, current medications, past family history, past medical history, past social history, past surgical history and problem list.    Review of Systems   Constitutional: Negative for chills, fatigue and fever.   HENT: Positive for congestion and ear pain. Negative for postnasal  "drip, rhinorrhea, sinus pressure, sneezing and sore throat.    Eyes: Negative for pain, discharge, redness and itching.   Respiratory: Negative for cough, chest tightness, shortness of breath and wheezing.    Cardiovascular: Negative for chest pain.   Gastrointestinal: Negative for abdominal pain, diarrhea, nausea and vomiting.   Endocrine: Negative.    Genitourinary: Negative.    Musculoskeletal: Negative for arthralgias and myalgias.   Skin: Negative for rash.   Allergic/Immunologic: Positive for environmental allergies.   Neurological: Negative for headaches.   Hematological: Negative for adenopathy.   Psychiatric/Behavioral: Negative.        Objective:    /81 (BP Location: Right arm, Patient Position: Sitting)   Pulse 84   Temp 98.7 °F (37.1 °C)   Resp 18   Ht 151.1 cm (59.5\")   Wt 60.9 kg (134 lb 3.2 oz)   LMP  (LMP Unknown) Comment: LAST PAP 3/5/18 BY DR DUARTE  BMI 26.65 kg/m²     Physical Exam   Constitutional: She is oriented to person, place, and time. She appears well-developed and well-nourished.   HENT:   Head: Normocephalic and atraumatic.   Right Ear: External ear and ear canal normal.   Left Ear: Tympanic membrane, external ear and ear canal normal.   Mouth/Throat: Oropharynx is clear and moist and mucous membranes are normal. No oral lesions.   Mild nasal congestion.  Sparse clear fluid right TM. No sinus tenderness to palpation.   Eyes: Lids are normal.   Neck: Normal range of motion. Neck supple.   Cardiovascular: Normal rate and regular rhythm.    No murmur heard.  Pulmonary/Chest: Effort normal and breath sounds normal.   Lymphadenopathy:     She has no cervical adenopathy.   Neurological: She is alert and oriented to person, place, and time.   Skin: Skin is warm, dry and intact. No rash noted.   Psychiatric: She has a normal mood and affect.   Nursing note and vitals reviewed.      Assessment/Plan:    Helga was seen today for earache.    Diagnoses and all orders for this " visit:    Otalgia, right    Other orders  -     predniSONE (DELTASONE) 20 MG tablet; Take 1 tablet by mouth 2 (Two) Times a Day for 5 days.        Return if symptoms worsen or fail to improve.

## 2018-10-15 RX ORDER — PANTOPRAZOLE SODIUM 40 MG/1
TABLET, DELAYED RELEASE ORAL
Qty: 30 TABLET | Refills: 2 | Status: SHIPPED | OUTPATIENT
Start: 2018-10-15 | End: 2018-12-20 | Stop reason: SDUPTHER

## 2018-11-14 DIAGNOSIS — I10 BENIGN ESSENTIAL HYPERTENSION: ICD-10-CM

## 2018-11-14 RX ORDER — LOSARTAN POTASSIUM AND HYDROCHLOROTHIAZIDE 25; 100 MG/1; MG/1
TABLET ORAL
Qty: 30 TABLET | Refills: 5 | Status: SHIPPED | OUTPATIENT
Start: 2018-11-14 | End: 2019-03-20 | Stop reason: SDUPTHER

## 2018-12-20 RX ORDER — PANTOPRAZOLE SODIUM 40 MG/1
TABLET, DELAYED RELEASE ORAL
Qty: 30 TABLET | Refills: 1 | Status: SHIPPED | OUTPATIENT
Start: 2018-12-20 | End: 2019-03-20

## 2019-03-11 ENCOUNTER — OFFICE VISIT (OUTPATIENT)
Dept: INTERNAL MEDICINE | Facility: CLINIC | Age: 64
End: 2019-03-11

## 2019-03-11 VITALS
DIASTOLIC BLOOD PRESSURE: 80 MMHG | HEART RATE: 68 BPM | WEIGHT: 139.25 LBS | RESPIRATION RATE: 18 BRPM | SYSTOLIC BLOOD PRESSURE: 122 MMHG | BODY MASS INDEX: 27.65 KG/M2 | TEMPERATURE: 97.2 F

## 2019-03-11 DIAGNOSIS — J32.9 SINUSITIS, UNSPECIFIED CHRONICITY, UNSPECIFIED LOCATION: ICD-10-CM

## 2019-03-11 DIAGNOSIS — J40 BRONCHITIS: ICD-10-CM

## 2019-03-11 DIAGNOSIS — R05.9 COUGH: Primary | ICD-10-CM

## 2019-03-11 LAB
EXPIRATION DATE: NORMAL
FLUAV AG NPH QL: NEGATIVE
FLUBV AG NPH QL: NEGATIVE
INTERNAL CONTROL: NORMAL
Lab: NORMAL

## 2019-03-11 PROCEDURE — 99213 OFFICE O/P EST LOW 20 MIN: CPT | Performed by: NURSE PRACTITIONER

## 2019-03-11 PROCEDURE — 87804 INFLUENZA ASSAY W/OPTIC: CPT | Performed by: NURSE PRACTITIONER

## 2019-03-11 RX ORDER — AZITHROMYCIN 250 MG/1
TABLET, FILM COATED ORAL
Qty: 6 TABLET | Refills: 0 | Status: SHIPPED | OUTPATIENT
Start: 2019-03-11 | End: 2019-03-19

## 2019-03-11 RX ORDER — ESTRADIOL 1 MG/1
TABLET ORAL
COMMUNITY
Start: 2019-02-11 | End: 2019-03-20 | Stop reason: SDUPTHER

## 2019-03-11 RX ORDER — MEDROXYPROGESTERONE ACETATE 5 MG/1
TABLET ORAL
COMMUNITY
Start: 2019-02-11 | End: 2019-03-20 | Stop reason: SDUPTHER

## 2019-03-11 RX ORDER — BENZONATATE 200 MG/1
200 CAPSULE ORAL 3 TIMES DAILY PRN
Qty: 30 CAPSULE | Refills: 0 | Status: SHIPPED | OUTPATIENT
Start: 2019-03-11 | End: 2019-03-19

## 2019-03-11 NOTE — PROGRESS NOTES
Chief Complaint   Patient presents with   • Cough     x 10 days no fever   • Nasal Congestion        Subjective     History of Present Illness   The patient is here today for cough and congestion.  She uses NS sprayon a chronic bases and started it using more because of cough.  Sinus congestion.   No nausea vomiting diarrhea rash or arthralgias.    No problem-specific Assessment & Plan notes found for this encounter.      The following portions of the patient's history were reviewed and updated as appropriate: allergies, current medications, past family history, past medical history, past social history, past surgical history and problem list.    Review of Systems   Constitutional: Negative for activity change, appetite change, chills, fatigue and fever.   HENT: Negative for congestion, postnasal drip and sore throat.    Eyes: Negative for visual disturbance.   Respiratory: Positive for cough. Negative for shortness of breath.    Cardiovascular: Negative for chest pain, palpitations and leg swelling.   Gastrointestinal: Negative for abdominal pain, constipation, diarrhea, nausea and GERD.   Musculoskeletal: Negative for arthralgias and myalgias.   Skin: Negative for rash.   Allergic/Immunologic: Negative for environmental allergies.   Neurological: Negative for dizziness.   Psychiatric/Behavioral: Negative for sleep disturbance.   All other systems reviewed and are negative.      Objective   Physical Exam   Constitutional: She is oriented to person, place, and time. She appears well-developed and well-nourished.   HENT:   Head: Normocephalic and atraumatic.   Right Ear: External ear normal.   Left Ear: External ear normal.   Nose: Nose normal.   The tender to palpate bilateral maxillary sinuses moderate clear postnasal drainage noted   Neck: No thyromegaly present.   Cardiovascular: Normal rate, regular rhythm and intact distal pulses.   Pulmonary/Chest: Effort normal.   Patient is diminished in bases   Abdominal:  Soft. Bowel sounds are normal. She exhibits no distension. There is no tenderness.   Musculoskeletal: She exhibits no edema.   Lymphadenopathy:     She has no cervical adenopathy.   Neurological: She is alert and oriented to person, place, and time.   Skin: Capillary refill takes 2 to 3 seconds.   Psychiatric: She has a normal mood and affect. Her behavior is normal.   Nursing note and vitals reviewed.         Assessment/Plan   Helga was seen today for cough and nasal congestion.    Diagnoses and all orders for this visit:    Cough  -     POC Influenza A / B    Bronchitis  -     azithromycin (ZITHROMAX Z-BETTINA) 250 MG tablet; Take 2 tablets the first day, then 1 tablet daily for 4 days.  -     benzonatate (TESSALON) 200 MG capsule; Take 1 capsule by mouth 3 (Three) Times a Day As Needed for Cough.    Sinusitis, unspecified chronicity, unspecified location  -     azithromycin (ZITHROMAX Z-BETTINA) 250 MG tablet; Take 2 tablets the first day, then 1 tablet daily for 4 days.  -     benzonatate (TESSALON) 200 MG capsule; Take 1 capsule by mouth 3 (Three) Times a Day As Needed for Cough.          Return if symptoms worsen or fail to improve.  RTC/call  If symptoms worsen  Meds MOA and SE's reviewed and pt v/u

## 2019-03-19 ENCOUNTER — OFFICE VISIT (OUTPATIENT)
Dept: INTERNAL MEDICINE | Facility: CLINIC | Age: 64
End: 2019-03-19

## 2019-03-19 VITALS
HEART RATE: 80 BPM | WEIGHT: 138 LBS | TEMPERATURE: 99.1 F | BODY MASS INDEX: 27.41 KG/M2 | RESPIRATION RATE: 16 BRPM | DIASTOLIC BLOOD PRESSURE: 72 MMHG | SYSTOLIC BLOOD PRESSURE: 128 MMHG

## 2019-03-19 DIAGNOSIS — I10 ESSENTIAL HYPERTENSION: Primary | ICD-10-CM

## 2019-03-19 DIAGNOSIS — Z12.31 ENCOUNTER FOR SCREENING MAMMOGRAM FOR BREAST CANCER: ICD-10-CM

## 2019-03-19 DIAGNOSIS — Z12.11 SCREEN FOR COLON CANCER: ICD-10-CM

## 2019-03-19 DIAGNOSIS — Z00.00 PHYSICAL EXAM: ICD-10-CM

## 2019-03-19 LAB
25(OH)D3 SERPL-MCNC: 73.4 NG/ML
ALBUMIN SERPL-MCNC: 4.2 G/DL (ref 3.2–4.8)
ALBUMIN/GLOB SERPL: 2 G/DL (ref 1.5–2.5)
ALP SERPL-CCNC: 67 U/L (ref 25–100)
ALT SERPL W P-5'-P-CCNC: 27 U/L (ref 7–40)
ANION GAP SERPL CALCULATED.3IONS-SCNC: 7 MMOL/L (ref 3–11)
ARTICHOKE IGE QN: 109 MG/DL (ref 0–130)
AST SERPL-CCNC: 25 U/L (ref 0–33)
BASOPHILS # BLD AUTO: 0.03 10*3/MM3 (ref 0–0.2)
BASOPHILS NFR BLD AUTO: 0.3 % (ref 0–1)
BILIRUB BLD-MCNC: NEGATIVE MG/DL
BILIRUB SERPL-MCNC: 0.3 MG/DL (ref 0.3–1.2)
BUN BLD-MCNC: 15 MG/DL (ref 9–23)
BUN/CREAT SERPL: 20.5 (ref 7–25)
CALCIUM SPEC-SCNC: 9.5 MG/DL (ref 8.7–10.4)
CHLORIDE SERPL-SCNC: 98 MMOL/L (ref 99–109)
CHOLEST SERPL-MCNC: 172 MG/DL (ref 0–200)
CLARITY, POC: CLEAR
CO2 SERPL-SCNC: 29 MMOL/L (ref 20–31)
COLOR UR: YELLOW
CREAT BLD-MCNC: 0.73 MG/DL (ref 0.6–1.3)
DEPRECATED RDW RBC AUTO: 42.8 FL (ref 37–54)
EOSINOPHIL # BLD AUTO: 0.11 10*3/MM3 (ref 0–0.3)
EOSINOPHIL NFR BLD AUTO: 1.3 % (ref 0–3)
ERYTHROCYTE [DISTWIDTH] IN BLOOD BY AUTOMATED COUNT: 12.6 % (ref 11.3–14.5)
EXPIRATION DATE: ABNORMAL
GFR SERPL CREATININE-BSD FRML MDRD: 81 ML/MIN/1.73
GLOBULIN UR ELPH-MCNC: 2.1 GM/DL
GLUCOSE BLD-MCNC: 154 MG/DL (ref 70–100)
GLUCOSE UR STRIP-MCNC: ABNORMAL MG/DL
HCT VFR BLD AUTO: 40.7 % (ref 34.5–44)
HDLC SERPL-MCNC: 65 MG/DL (ref 40–60)
HGB BLD-MCNC: 13.9 G/DL (ref 11.5–15.5)
IMM GRANULOCYTES # BLD AUTO: 0.05 10*3/MM3 (ref 0–0.05)
IMM GRANULOCYTES NFR BLD AUTO: 0.6 % (ref 0–0.6)
KETONES UR QL: NEGATIVE
LEUKOCYTE EST, POC: NEGATIVE
LYMPHOCYTES # BLD AUTO: 1.15 10*3/MM3 (ref 0.6–4.8)
LYMPHOCYTES NFR BLD AUTO: 13.3 % (ref 24–44)
Lab: ABNORMAL
MCH RBC QN AUTO: 31.7 PG (ref 27–31)
MCHC RBC AUTO-ENTMCNC: 34.2 G/DL (ref 32–36)
MCV RBC AUTO: 92.9 FL (ref 80–99)
MONOCYTES # BLD AUTO: 0.72 10*3/MM3 (ref 0–1)
MONOCYTES NFR BLD AUTO: 8.3 % (ref 0–12)
NEUTROPHILS # BLD AUTO: 6.59 10*3/MM3 (ref 1.5–8.3)
NEUTROPHILS NFR BLD AUTO: 76.2 % (ref 41–71)
NITRITE UR-MCNC: NEGATIVE MG/ML
PH UR: 7 [PH] (ref 5–8)
PLATELET # BLD AUTO: 336 10*3/MM3 (ref 150–450)
PMV BLD AUTO: 8.6 FL (ref 6–12)
POTASSIUM BLD-SCNC: 3.5 MMOL/L (ref 3.5–5.5)
PROT SERPL-MCNC: 6.3 G/DL (ref 5.7–8.2)
PROT UR STRIP-MCNC: NEGATIVE MG/DL
RBC # BLD AUTO: 4.38 10*6/MM3 (ref 3.89–5.14)
RBC # UR STRIP: NEGATIVE /UL
SODIUM BLD-SCNC: 134 MMOL/L (ref 132–146)
SP GR UR: 1.01 (ref 1–1.03)
TRIGL SERPL-MCNC: 105 MG/DL (ref 0–150)
TSH SERPL DL<=0.05 MIU/L-ACNC: 1.11 MIU/ML (ref 0.35–5.35)
UROBILINOGEN UR QL: NORMAL
WBC NRBC COR # BLD: 8.65 10*3/MM3 (ref 3.5–10.8)

## 2019-03-19 PROCEDURE — 80053 COMPREHEN METABOLIC PANEL: CPT | Performed by: INTERNAL MEDICINE

## 2019-03-19 PROCEDURE — 85025 COMPLETE CBC W/AUTO DIFF WBC: CPT | Performed by: INTERNAL MEDICINE

## 2019-03-19 PROCEDURE — 36415 COLL VENOUS BLD VENIPUNCTURE: CPT | Performed by: INTERNAL MEDICINE

## 2019-03-19 PROCEDURE — 80061 LIPID PANEL: CPT | Performed by: INTERNAL MEDICINE

## 2019-03-19 PROCEDURE — 81003 URINALYSIS AUTO W/O SCOPE: CPT | Performed by: INTERNAL MEDICINE

## 2019-03-19 PROCEDURE — 84443 ASSAY THYROID STIM HORMONE: CPT | Performed by: INTERNAL MEDICINE

## 2019-03-19 PROCEDURE — 99396 PREV VISIT EST AGE 40-64: CPT | Performed by: INTERNAL MEDICINE

## 2019-03-19 PROCEDURE — 82306 VITAMIN D 25 HYDROXY: CPT | Performed by: INTERNAL MEDICINE

## 2019-03-19 NOTE — PROGRESS NOTES
Chief Complaint   Patient presents with   • Follow-up     FOLLOW UP CHRONIC MEDICAL PROBLEMS       History of Present Illness    The patient presents for an established patient physical examination and health maintenance visit.    The patient presents for a follow-up related to hypertension. The patient reports that she has had no headaches, chest pain, dyspnea, edema, syncope, blurred vision or palpitations. She states that she is taking her medication as prescribed. She is not having medication side effects.    Review of Systems    CONSTITUTIONAL- Denies Unexplained Weight Loss, Fever, Chills, Sweats, Fatigue, Weakness or Malaise.    NECK- Denies Decreased Range of Motion, Stiffness, Thyroid Nodules, Enlarged Lymph Nodes or Goiter.    EYES- Denies Eye Pain, Eye Drainage, Eye Redness, Eye Discharge, Decreased Vision, Visual Disturbance, Diplopia, Visual Loss or Swollen Eyelid.    HENT- Denies Nasal Discharge, Sore Throat, Ear Pain, Ear Drainage, Sinus Pain, Nasal Congestion, Decreased Hearing or Tinnitus.    PULMONARY- Denies Wheezing, Sputum Production, Cough, Hemoptysis or Pleuritic Chest Pain.    GASTROINTESTINAL- Denies Abdominal Pain, Nausea, Vomiting, Diarrhea, Blood per Rectum, Constipation or Heartburn.    CARDIOVASCULAR- Denies Claudication or Irregular Heart Beat.    GENITOURINARY- Denies Dysuria, Hematuria, Urinary Frequency, Urinary Leakage, Pelvic Pain, Vaginal Prolapse, Vaginal Discharge, Dyspareunia or Abnormal Menses.    ENDOCRINE- Denies Cold Intolerance, Depression, Hair Loss, Heat Intolerance, Memory Loss, Polydypsia, Polyphagia, Polyuria, Sleep Disturbance or Weight Gain.    NEUROLOGIC- Denies Seizures, Confusion or Excessive Daytime Sleepiness.    MUSCULOSKELETAL- Denies Joint Pain, Joint Stiffness, Decreased Range of Motion, Joint Swelling or Erythema of Joints.    INTEGUMENTARY- Denies Rash, Lumps, Sores, Itching, Dryness, Color Change, Changes in Hair, Brittle Nails, Discolored Nails,  Thickened Nails, Growths or Alopecia.    Medications      Current Outpatient Medications:   •  Cholecalciferol (VITAMIN D-3 PO), Take 1 capsule by mouth 1 (One) Time Per Week. TAKES 50,000 UNITS WEEKLY, Disp: , Rfl:   •  estradiol (ESTRACE) 1 MG tablet, , Disp: , Rfl:   •  losartan-hydrochlorothiazide (HYZAAR) 100-25 MG per tablet, TAKE ONE TABLET BY MOUTH DAILY, Disp: 30 tablet, Rfl: 5  •  medroxyPROGESTERone (PROVERA) 5 MG tablet, , Disp: , Rfl:   •  pantoprazole (PROTONIX) 40 MG EC tablet, TAKE ONE TABLET BY MOUTH DAILY, Disp: 30 tablet, Rfl: 1  •  Triamcinolone Acetonide (NASACORT) 55 MCG/ACT nasal inhaler, 2 sprays into each nostril Daily., Disp: , Rfl:      Allergies    No Known Allergies    Problem List    Patient Active Problem List   Diagnosis   • Benign essential hypertension   • Hearing loss   • Benign paroxysmal positional vertigo   • Influenza       Medications, Allergies, Problems List and Past History were reviewed and updated.    Physical Examination    /72 (BP Location: Left arm, Patient Position: Sitting, Cuff Size: Adult)   Pulse 80   Temp 99.1 °F (37.3 °C) (Temporal)   Resp 16   Wt 62.6 kg (138 lb)   LMP  (LMP Unknown) Comment: LAST PAP 3/5/18 BY DR DUARTE  Breastfeeding? No   BMI 27.41 kg/m²     HEENT: Head- Normocephalic Atraumatic. Facies- Within normal limits. Pinnas- Normal texture and shape bilaterally. Canals- Normal bilaterally. TMs- Normal bilaterally. Nares- Patent bilaterally. Nasal Septum- is normal. There is no tenderness to palpation over the frontal or maxillary sinuses. Lids- Normal bilaterally. Conjunctiva- Clear bilaterally. Sclera- Anicteric bilaterally. Oropharynx- Moist with no lesions. Tonsils- No enlargement, erythema or exudate.    Neck: Thyroid- non enlarged, symmetric and has no nodules. No bruits are detected. ROM- Normal Range of Motion with no rigidity.    Lungs: Auscultation- Clear to auscultation bilaterally. There are no retractions, clubbing or  cyanosis. The Expiratory to Inspiratory ratio is equal.    Lymph Nodes: Cervical- no enlarged lymph nodes noted. Clavicular- Deferred. Axillary- Deferred. Inguinal- Deferred.    Cardiovascular: There are no carotid bruits. Heart- Normal Rate with Regular rhythm and no murmurs. There are no gallops. There are no rubs. In the lower extremities there is no edema. The upper extremities do not have edema.    Abdomen: Soft, benign, non-tender with no masses, hernias, organomegaly or scars.    Breast: Normal contours bilaterally with no masses, discharge, skin changes or lumps. There are no scars noted.    Dermatologic: The patient has no worrisome or suspicious skin lesions noted.    Impression and Assessment    Normal Physical Examination.    Encounter for Screening for Malignant Neoplasm of the Colon.    Encounter for Screening Mammogram for Malignant Neoplasm of the Breast.    Essential Hypertension.    Plan    Essential Hypertension Plan: The current plan was continued.    Counseling was provided regarding: Adequate Aerobic Exercise, Dental Visits, Flossing Teeth, Handwashing and Heart Healthy Diet.    The following was ordered for screening and health maintenance: CBC w Automated Diff, CMP, Colonoscopy, Lipid Profile, Screening Mammogram, TSH and U/A.       Immunizations Ordered and Administered: None.    Helga was seen today for follow-up.    Diagnoses and all orders for this visit:    Essential hypertension  -     Comprehensive Metabolic Panel  -     POC Urinalysis Dipstick, Automated    Physical exam  -     Comprehensive Metabolic Panel  -     Lipid Panel  -     CBC & Differential  -     TSH  -     Vitamin D 25 Hydroxy  -     POC Urinalysis Dipstick, Automated  -     Mammo Screening Digital Tomosynthesis Bilateral With CAD; Future  -     CBC Auto Differential    Screen for colon cancer  -     Ambulatory Referral For Screening Colonoscopy    Encounter for screening mammogram for breast cancer  -     Mammo  Screening Digital Tomosynthesis Bilateral With CAD; Future        Return to Office    The patient was instructed to return for follow-up in 6 months. Next Visit: Follow-up.    The patient was instructed to return sooner if the condition changes, worsens, or does not resolve.

## 2019-03-20 ENCOUNTER — TELEPHONE (OUTPATIENT)
Dept: INTERNAL MEDICINE | Facility: CLINIC | Age: 64
End: 2019-03-20

## 2019-03-20 DIAGNOSIS — I10 BENIGN ESSENTIAL HYPERTENSION: ICD-10-CM

## 2019-03-20 RX ORDER — MEDROXYPROGESTERONE ACETATE 5 MG/1
5 TABLET ORAL DAILY
Qty: 90 TABLET | Refills: 1 | Status: SHIPPED | OUTPATIENT
Start: 2019-03-20 | End: 2020-09-30

## 2019-03-20 RX ORDER — ESTRADIOL 1 MG/1
1 TABLET ORAL DAILY
Qty: 90 TABLET | Refills: 1 | Status: SHIPPED | OUTPATIENT
Start: 2019-03-20 | End: 2021-04-06 | Stop reason: DRUGHIGH

## 2019-03-20 RX ORDER — LOSARTAN POTASSIUM AND HYDROCHLOROTHIAZIDE 25; 100 MG/1; MG/1
1 TABLET ORAL DAILY
Qty: 90 TABLET | Refills: 1 | Status: SHIPPED | OUTPATIENT
Start: 2019-03-20 | End: 2019-09-27 | Stop reason: SDUPTHER

## 2019-03-20 RX ORDER — PANTOPRAZOLE SODIUM 40 MG/1
40 TABLET, DELAYED RELEASE ORAL DAILY
Qty: 90 TABLET | Refills: 1 | Status: SHIPPED | OUTPATIENT
Start: 2019-03-20 | End: 2019-12-30

## 2019-03-20 RX ORDER — ONDANSETRON 4 MG/1
4 TABLET, ORALLY DISINTEGRATING ORAL EVERY 8 HOURS PRN
Qty: 30 TABLET | Refills: 0 | Status: SHIPPED | OUTPATIENT
Start: 2019-03-20 | End: 2019-05-09

## 2019-03-20 RX ORDER — CIPROFLOXACIN 500 MG/1
500 TABLET, FILM COATED ORAL 2 TIMES DAILY PRN
Qty: 20 TABLET | Refills: 0 | Status: SHIPPED | OUTPATIENT
Start: 2019-03-20 | End: 2019-03-30

## 2019-03-20 RX ORDER — PANTOPRAZOLE SODIUM 40 MG/1
TABLET, DELAYED RELEASE ORAL
Qty: 30 TABLET | Refills: 2 | Status: SHIPPED | OUTPATIENT
Start: 2019-03-20 | End: 2019-03-20 | Stop reason: SDUPTHER

## 2019-04-08 ENCOUNTER — TRANSCRIBE ORDERS (OUTPATIENT)
Dept: ADMINISTRATIVE | Facility: HOSPITAL | Age: 64
End: 2019-04-08

## 2019-04-08 DIAGNOSIS — Z13.820 SCREENING FOR OSTEOPOROSIS: Primary | ICD-10-CM

## 2019-04-09 NOTE — TELEPHONE ENCOUNTER
S/W PT, INFORMED THAT DR DEL RIO DID AGREE AND THAT SHE HAS COMPLETED THE 50,000 UNITS REGIME OF VITAMIN D3 AND TO GO TO THE OTC ONCE DAILY.  VERB GOOD UNDERSTANDING AND AGREEMENT.

## 2019-04-16 ENCOUNTER — APPOINTMENT (OUTPATIENT)
Dept: BONE DENSITY | Facility: HOSPITAL | Age: 64
End: 2019-04-16

## 2019-05-08 ENCOUNTER — HOSPITAL ENCOUNTER (OUTPATIENT)
Dept: MAMMOGRAPHY | Facility: HOSPITAL | Age: 64
Discharge: HOME | End: 2019-05-08
Admitting: OBSTETRICS & GYNECOLOGY

## 2019-05-08 ENCOUNTER — APPOINTMENT (OUTPATIENT)
Dept: OTHER | Facility: HOSPITAL | Age: 64
End: 2019-05-08

## 2019-05-08 DIAGNOSIS — Z00.00 PHYSICAL EXAM: ICD-10-CM

## 2019-05-08 DIAGNOSIS — Z12.31 ENCOUNTER FOR SCREENING MAMMOGRAM FOR BREAST CANCER: ICD-10-CM

## 2019-05-08 DIAGNOSIS — Z92.89 H/O MAMMOGRAM: ICD-10-CM

## 2019-05-08 PROCEDURE — 77067 SCR MAMMO BI INCL CAD: CPT | Performed by: RADIOLOGY

## 2019-05-08 PROCEDURE — 77063 BREAST TOMOSYNTHESIS BI: CPT | Performed by: RADIOLOGY

## 2019-05-08 PROCEDURE — 77063 BREAST TOMOSYNTHESIS BI: CPT

## 2019-05-08 PROCEDURE — 77067 SCR MAMMO BI INCL CAD: CPT

## 2019-05-09 ENCOUNTER — OFFICE VISIT (OUTPATIENT)
Dept: INTERNAL MEDICINE | Facility: CLINIC | Age: 64
End: 2019-05-09

## 2019-05-09 VITALS
SYSTOLIC BLOOD PRESSURE: 130 MMHG | OXYGEN SATURATION: 98 % | BODY MASS INDEX: 26.9 KG/M2 | DIASTOLIC BLOOD PRESSURE: 82 MMHG | WEIGHT: 137 LBS | HEIGHT: 60 IN | TEMPERATURE: 98 F | HEART RATE: 73 BPM | RESPIRATION RATE: 18 BRPM

## 2019-05-09 DIAGNOSIS — J06.9 ACUTE URI: Primary | ICD-10-CM

## 2019-05-09 PROCEDURE — 99213 OFFICE O/P EST LOW 20 MIN: CPT | Performed by: PHYSICIAN ASSISTANT

## 2019-05-09 RX ORDER — AZITHROMYCIN 250 MG/1
TABLET, FILM COATED ORAL
Qty: 6 TABLET | Refills: 0 | Status: SHIPPED | OUTPATIENT
Start: 2019-05-09 | End: 2019-08-08

## 2019-05-09 NOTE — PROGRESS NOTES
Chief Complaint   Patient presents with   • Sore Throat     x5 days    • Earache     x5 days   • Cough     x5 days        Subjective       History of Present Illness     Helga Cardenas is a 63 y.o. female. She presents with 5 day history of worsening URI symptoms. Pt states she has had a mildly productive cough with yellow mucous, PND, sore throat, and bilateral ear pain x5 days. It hurts to swallow solids, and she is gagging with swallowing due to thick PND. She has chest tightness. She denies fever, chills, SOA, wheezing, abdominal pain, N/V. She takes Allegra for allergies, but has not tried any other meds for this issue.       The following portions of the patient's history were reviewed and updated as appropriate: allergies, current medications, past medical history, past social history and problem list.    No Known Allergies  Social History     Tobacco Use   • Smoking status: Never Smoker   • Smokeless tobacco: Never Used   Substance Use Topics   • Alcohol use: No         Current Outpatient Medications:   •  estradiol (ESTRACE) 1 MG tablet, Take 1 tablet by mouth Daily., Disp: 90 tablet, Rfl: 1  •  Fexofenadine HCl (ALLEGRA ALLERGY PO), Take  by mouth., Disp: , Rfl:   •  losartan-hydrochlorothiazide (HYZAAR) 100-25 MG per tablet, Take 1 tablet by mouth Daily., Disp: 90 tablet, Rfl: 1  •  medroxyPROGESTERone (PROVERA) 5 MG tablet, Take 1 tablet by mouth Daily., Disp: 90 tablet, Rfl: 1  •  pantoprazole (PROTONIX) 40 MG EC tablet, Take 1 tablet by mouth Daily., Disp: 90 tablet, Rfl: 1  •  Triamcinolone Acetonide (NASACORT) 55 MCG/ACT nasal inhaler, 2 sprays into each nostril Daily., Disp: , Rfl:   •  azithromycin (ZITHROMAX) 250 MG tablet, Take 2 tablets the first day, then 1 tablet daily for 4 days., Disp: 6 tablet, Rfl: 0    Review of Systems   Constitutional: Negative for chills, fatigue and fever.   HENT: Positive for congestion, ear pain, postnasal drip, sore throat and trouble swallowing (pain).  Negative for sinus pressure and sneezing.    Eyes: Negative for pain.   Respiratory: Positive for cough and chest tightness. Negative for shortness of breath and wheezing.    Cardiovascular: Negative for chest pain.   Gastrointestinal: Negative for abdominal pain, diarrhea, nausea and vomiting.   Genitourinary: Negative for dysuria.   Neurological: Negative for dizziness and headache.       Objective   Vitals:    05/09/19 1132   BP: 130/82   Pulse: 73   Resp: 18   Temp: 98 °F (36.7 °C)   SpO2: 98%     Physical Exam   Constitutional: She appears well-developed and well-nourished.   HENT:   Head: Normocephalic and atraumatic.   Right Ear: External ear and ear canal normal. Tympanic membrane is not erythematous.   Left Ear: External ear and ear canal normal. Tympanic membrane is not erythematous.   Nose: Congestion present. Right sinus exhibits no maxillary sinus tenderness and no frontal sinus tenderness. Left sinus exhibits no maxillary sinus tenderness and no frontal sinus tenderness.   Mouth/Throat: Oropharynx is clear and moist and mucous membranes are normal.   +mild clear fluid behind TMs bilaterally   Eyes: Conjunctivae are normal.   Neck: Normal range of motion. Neck supple.   Cardiovascular: Normal rate and regular rhythm.   No murmur heard.  Pulmonary/Chest: Effort normal and breath sounds normal. She has no wheezes. She has no rales.   Lymphadenopathy:     She has no cervical adenopathy.   Psychiatric: She has a normal mood and affect. Her behavior is normal.         Assessment/Plan   Helga was seen today for sore throat, earache and cough.    Diagnoses and all orders for this visit:    Acute URI  -     azithromycin (ZITHROMAX) 250 MG tablet; Take 2 tablets the first day, then 1 tablet daily for 4 days.      Finish all Abx through completion.  Pt has Nasacort at home- advised to begin.   Plenty of fluids.          Return if symptoms worsen or fail to improve.

## 2019-08-07 ENCOUNTER — TELEPHONE (OUTPATIENT)
Dept: INTERNAL MEDICINE | Facility: CLINIC | Age: 64
End: 2019-08-07

## 2019-08-07 NOTE — TELEPHONE ENCOUNTER
Probably needs to be seen to rule out sinus infection.    Please schedule with PA, NP or me.  Rey Desouza MD  1:32 PM  08/07/19

## 2019-08-07 NOTE — TELEPHONE ENCOUNTER
----- Message from Kimberly Lan sent at 8/7/2019  1:05 PM EDT -----  Patient stated she has been coughing, having nasal congestion, and headache's. Patient stated that the cough is not productive.  Patient wants to know if she should be seen or if you recommend something over the counter. [facility]. Patient can be reached at [phone].

## 2019-08-08 ENCOUNTER — OFFICE VISIT (OUTPATIENT)
Dept: INTERNAL MEDICINE | Facility: CLINIC | Age: 64
End: 2019-08-08

## 2019-08-08 VITALS
DIASTOLIC BLOOD PRESSURE: 84 MMHG | HEIGHT: 60 IN | SYSTOLIC BLOOD PRESSURE: 140 MMHG | BODY MASS INDEX: 27.09 KG/M2 | HEART RATE: 72 BPM | TEMPERATURE: 97.8 F | OXYGEN SATURATION: 99 % | RESPIRATION RATE: 16 BRPM | WEIGHT: 138 LBS

## 2019-08-08 DIAGNOSIS — R19.5 LOOSE STOOLS: ICD-10-CM

## 2019-08-08 DIAGNOSIS — K21.9 GASTROESOPHAGEAL REFLUX DISEASE, ESOPHAGITIS PRESENCE NOT SPECIFIED: ICD-10-CM

## 2019-08-08 DIAGNOSIS — J06.9 UPPER RESPIRATORY TRACT INFECTION, UNSPECIFIED TYPE: Primary | ICD-10-CM

## 2019-08-08 PROCEDURE — 99213 OFFICE O/P EST LOW 20 MIN: CPT | Performed by: PHYSICIAN ASSISTANT

## 2019-08-08 RX ORDER — AZITHROMYCIN 250 MG/1
TABLET, FILM COATED ORAL
Qty: 6 TABLET | Refills: 0 | Status: SHIPPED | OUTPATIENT
Start: 2019-08-08 | End: 2019-09-17

## 2019-08-14 ENCOUNTER — LAB (OUTPATIENT)
Dept: INTERNAL MEDICINE | Facility: CLINIC | Age: 64
End: 2019-08-14

## 2019-08-14 DIAGNOSIS — R19.5 LOOSE STOOLS: ICD-10-CM

## 2019-08-14 DIAGNOSIS — K21.9 GASTROESOPHAGEAL REFLUX DISEASE, ESOPHAGITIS PRESENCE NOT SPECIFIED: ICD-10-CM

## 2019-08-14 PROCEDURE — 87338 HPYLORI STOOL AG IA: CPT | Performed by: PHYSICIAN ASSISTANT

## 2019-08-17 LAB — H PYLORI AG STL QL IA: NEGATIVE

## 2019-09-17 ENCOUNTER — OFFICE VISIT (OUTPATIENT)
Dept: INTERNAL MEDICINE | Facility: CLINIC | Age: 64
End: 2019-09-17

## 2019-09-17 VITALS
RESPIRATION RATE: 16 BRPM | HEART RATE: 76 BPM | DIASTOLIC BLOOD PRESSURE: 78 MMHG | BODY MASS INDEX: 27.8 KG/M2 | WEIGHT: 140 LBS | TEMPERATURE: 98.7 F | SYSTOLIC BLOOD PRESSURE: 134 MMHG

## 2019-09-17 DIAGNOSIS — K21.9 GASTROESOPHAGEAL REFLUX DISEASE, ESOPHAGITIS PRESENCE NOT SPECIFIED: ICD-10-CM

## 2019-09-17 DIAGNOSIS — I10 ESSENTIAL HYPERTENSION: Primary | ICD-10-CM

## 2019-09-17 LAB
ALBUMIN SERPL-MCNC: 4.2 G/DL (ref 3.5–5.2)
ALBUMIN/GLOB SERPL: 1.6 G/DL
ALP SERPL-CCNC: 55 U/L (ref 39–117)
ALT SERPL W P-5'-P-CCNC: 23 U/L (ref 1–33)
ANION GAP SERPL CALCULATED.3IONS-SCNC: 13.2 MMOL/L (ref 5–15)
AST SERPL-CCNC: 23 U/L (ref 1–32)
BILIRUB SERPL-MCNC: 0.2 MG/DL (ref 0.2–1.2)
BUN BLD-MCNC: 16 MG/DL (ref 8–23)
BUN/CREAT SERPL: 25.4 (ref 7–25)
CALCIUM SPEC-SCNC: 9.1 MG/DL (ref 8.6–10.5)
CHLORIDE SERPL-SCNC: 94 MMOL/L (ref 98–107)
CO2 SERPL-SCNC: 27.8 MMOL/L (ref 22–29)
CREAT BLD-MCNC: 0.63 MG/DL (ref 0.57–1)
GFR SERPL CREATININE-BSD FRML MDRD: 95 ML/MIN/1.73
GLOBULIN UR ELPH-MCNC: 2.7 GM/DL
GLUCOSE BLD-MCNC: 143 MG/DL (ref 65–99)
POTASSIUM BLD-SCNC: 3.4 MMOL/L (ref 3.5–5.2)
PROT SERPL-MCNC: 6.9 G/DL (ref 6–8.5)
SODIUM BLD-SCNC: 135 MMOL/L (ref 136–145)

## 2019-09-17 PROCEDURE — 99213 OFFICE O/P EST LOW 20 MIN: CPT | Performed by: INTERNAL MEDICINE

## 2019-09-17 PROCEDURE — 36415 COLL VENOUS BLD VENIPUNCTURE: CPT | Performed by: INTERNAL MEDICINE

## 2019-09-17 PROCEDURE — 80053 COMPREHEN METABOLIC PANEL: CPT | Performed by: INTERNAL MEDICINE

## 2019-09-17 NOTE — PROGRESS NOTES
Chief Complaint   Patient presents with   • Follow-up     6 month follow up chronic medical problems       History of Present Illness    The patient presents for a follow-up related to hypertension. The patient reports that she has had no headaches, chest pain, dyspnea, edema, syncope, blurred vision or palpitations. She states that she is taking her medication as prescribed. She is not having medication side effects.    The patient presents for a follow-up related to GERD. The patient is on pantoprazole for her gastroesophageal reflux. The medication is taken on a regular basis and gives complete relief of the symptoms. She reports no abdominal pain, belching, diarrhea, dysphagia, early satiety, heartburn, hoarseness, nausea, odynophagia, rectal bleeding, vomiting or weight loss. The GERD has no known aggravating factors.    Review of Systems    CONSTITUTIONAL- Denies Fever, Chills, Sweats, Fatigue, Weakness or Malaise.    GASTROINTESTINAL- Denies Constipation.    PULMONARY- Denies Wheezing, Sputum Production, Cough, Hemoptysis or Pleuritic Chest Pain.    CARDIOVASCULAR- Denies Claudication or Irregular Heart Beat.    Medications      Current Outpatient Medications:   •  estradiol (ESTRACE) 1 MG tablet, Take 1 tablet by mouth Daily., Disp: 90 tablet, Rfl: 1  •  losartan-hydrochlorothiazide (HYZAAR) 100-25 MG per tablet, Take 1 tablet by mouth Daily., Disp: 90 tablet, Rfl: 1  •  medroxyPROGESTERone (PROVERA) 5 MG tablet, Take 1 tablet by mouth Daily., Disp: 90 tablet, Rfl: 1  •  pantoprazole (PROTONIX) 40 MG EC tablet, Take 1 tablet by mouth Daily., Disp: 90 tablet, Rfl: 1  •  Triamcinolone Acetonide (NASACORT) 55 MCG/ACT nasal inhaler, 2 sprays into each nostril Daily., Disp: , Rfl:      Allergies    No Known Allergies    Problem List    Patient Active Problem List   Diagnosis   • Benign essential hypertension   • Hearing loss   • Benign paroxysmal positional vertigo   • Influenza       Medications, Allergies,  Problems List and Past History were reviewed and updated.    Physical Examination    /78   Pulse 76   Temp 98.7 °F (37.1 °C) (Temporal)   Resp 16   Wt 63.5 kg (140 lb)   LMP  (LMP Unknown) Comment: LAST PAP 3/5/18 BY DR DUARTE  Breastfeeding? No   BMI 27.80 kg/m²     HEENT: Facies- Within normal limits. Lids- Normal bilaterally. Conjunctiva- Clear bilaterally. Sclera- Anicteric bilaterally.    Neck: Thyroid- non enlarged, symmetric and has no nodules. No bruits are detected.    Lungs: Auscultation- Clear to auscultation bilaterally. There are no retractions, clubbing or cyanosis. The Expiratory to Inspiratory ratio is equal.    Cardiovascular: There are no carotid bruits. Heart- Normal Rate with Regular rhythm and no murmurs. There are no gallops. There are no rubs. In the lower extremities there is no edema. The upper extremities do not have edema.    Abdomen: Soft, benign, non-tender with no masses, hernias, organomegaly or scars.    Impression and Assessment    Essential Hypertension.    Gastroesophageal Reflux Disease.    Plan    Gastroesophageal Reflux Disease Plan: The current plan was continued.    Essential Hypertension Plan: The current plan was continued.    Helga was seen today for follow-up.    Diagnoses and all orders for this visit:    Essential hypertension  -     Comprehensive Metabolic Panel    Gastroesophageal reflux disease, esophagitis presence not specified  -     Comprehensive Metabolic Panel        Return to Office    The patient was instructed to return for follow-up in 6 months. Next Visit: Physical.    The patient was instructed to return sooner if the condition changes, worsens, or does not resolve.

## 2019-09-19 ENCOUNTER — TELEPHONE (OUTPATIENT)
Dept: INTERNAL MEDICINE | Facility: CLINIC | Age: 64
End: 2019-09-19

## 2019-09-19 NOTE — TELEPHONE ENCOUNTER
S/W pt, informed that Dr Gordon recommended 2 squirts of Afrin nasal spray in each nostril prior to flying. Verb good understanding and agreement.

## 2019-09-19 NOTE — TELEPHONE ENCOUNTER
Pt states having pressure in ear when flying and would like some recommendations. She will be flying to [place] soon.    Helga requested a call at [phone].

## 2019-09-19 NOTE — TELEPHONE ENCOUNTER
I would recommend 2 squirts of Afrin nasal spray in each nostril prior to flying.  Rey Desouza MD  3:08 PM  09/19/19

## 2019-09-27 DIAGNOSIS — I10 BENIGN ESSENTIAL HYPERTENSION: ICD-10-CM

## 2019-09-30 RX ORDER — LOSARTAN POTASSIUM AND HYDROCHLOROTHIAZIDE 25; 100 MG/1; MG/1
TABLET ORAL
Qty: 30 TABLET | Refills: 0 | Status: SHIPPED | OUTPATIENT
Start: 2019-09-30 | End: 2019-12-30

## 2019-10-27 DIAGNOSIS — I10 BENIGN ESSENTIAL HYPERTENSION: ICD-10-CM

## 2019-10-28 RX ORDER — LOSARTAN POTASSIUM AND HYDROCHLOROTHIAZIDE 25; 100 MG/1; MG/1
TABLET ORAL
Qty: 30 TABLET | Refills: 0 | Status: SHIPPED | OUTPATIENT
Start: 2019-10-28 | End: 2019-11-13

## 2019-11-04 ENCOUNTER — TELEPHONE (OUTPATIENT)
Dept: INTERNAL MEDICINE | Facility: CLINIC | Age: 64
End: 2019-11-04

## 2019-11-04 NOTE — TELEPHONE ENCOUNTER
If she has access to [employer] she can attach a photo to that and send it to me and I'll take a look.  If not, I would recommend a [facility].  Rey Desouza MD  4:31 PM  11/04/19

## 2019-11-04 NOTE — TELEPHONE ENCOUNTER
Helga GraciaRonald [phone]  Spoke to pt, confirmed she has developed a rash located on her inner thighs. Not painful, just a little itchy.     Pharmacy:  [facility]  [address] [address] [place]   [phone] Phone    Please advise?

## 2019-11-04 NOTE — TELEPHONE ENCOUNTER
S/W pt , states pt is in a store right now and he has her phone.  States he will have her call us back when she gets back to the car.  States they have our ofc #.

## 2019-11-04 NOTE — TELEPHONE ENCOUNTER
PATIENT IS IN [place]. SHE NOW HAS A RASH ON THE INSIDE OF HER LEGS, LEGS SWOLLEN, FEET SWOLLEN, TIGHTNESS, AND HER SKIN GETS VERY SENSITIVE WHEN SHES IN THE SUN.   SHE WOULD LIKE TO KNOW IF SHE CAN SEND A PICTURE.  TO ONE OF THE STAFF MEMBERS.   SHE SAYS THE HAPPENS EVERY TIME SHE TRAVELS.   SHE WOULD GUESS THAT THIS HAS HAPPENED 3 TIMES IN THE PAST YEAR.     PLEASE CALL PATIENT FOR FURTHER INSTRUCTION.   [phone]

## 2019-11-05 NOTE — TELEPHONE ENCOUNTER
S/W pt, states she has tried several times to send the pictures via achvr to Dr Gordon but could not get them to send.  States she is much better today and reports the swelling, redness and discomfort are all down.  States they have left [place] which was scorchingly hot and back in [place].  States she feels she is on the mend for now but that this is the 3rd time this has happened and always when she is on vacation.  States will not be back to [facility] next week.  Inst pt to seek evaluation at local walk in clinic if she worsens or doesn't continue to improve.  Verb agreement and apprec.

## 2019-11-13 ENCOUNTER — OFFICE VISIT (OUTPATIENT)
Dept: INTERNAL MEDICINE | Facility: CLINIC | Age: 64
End: 2019-11-13

## 2019-11-13 VITALS
WEIGHT: 141 LBS | RESPIRATION RATE: 20 BRPM | SYSTOLIC BLOOD PRESSURE: 148 MMHG | BODY MASS INDEX: 28 KG/M2 | TEMPERATURE: 98.6 F | DIASTOLIC BLOOD PRESSURE: 80 MMHG | HEART RATE: 84 BPM

## 2019-11-13 DIAGNOSIS — I77.6 VASCULITIS (HCC): Primary | ICD-10-CM

## 2019-11-13 LAB
ALBUMIN SERPL-MCNC: 4.2 G/DL (ref 3.5–5.2)
ALBUMIN/GLOB SERPL: 1.4 G/DL
ALP SERPL-CCNC: 64 U/L (ref 39–117)
ALT SERPL W P-5'-P-CCNC: 15 U/L (ref 1–33)
ANION GAP SERPL CALCULATED.3IONS-SCNC: 15.5 MMOL/L (ref 5–15)
AST SERPL-CCNC: 16 U/L (ref 1–32)
BASOPHILS # BLD AUTO: 0.04 10*3/MM3 (ref 0–0.2)
BASOPHILS NFR BLD AUTO: 0.5 % (ref 0–1.5)
BILIRUB BLD-MCNC: NEGATIVE MG/DL
BILIRUB SERPL-MCNC: 0.2 MG/DL (ref 0.2–1.2)
BUN BLD-MCNC: 12 MG/DL (ref 8–23)
BUN/CREAT SERPL: 18.8 (ref 7–25)
CALCIUM SPEC-SCNC: 9.5 MG/DL (ref 8.6–10.5)
CHLORIDE SERPL-SCNC: 101 MMOL/L (ref 98–107)
CLARITY, POC: CLEAR
CO2 SERPL-SCNC: 25.5 MMOL/L (ref 22–29)
COLOR UR: YELLOW
CREAT BLD-MCNC: 0.64 MG/DL (ref 0.57–1)
CRP SERPL-MCNC: 3.11 MG/DL (ref 0–0.5)
DEPRECATED RDW RBC AUTO: 44.9 FL (ref 37–54)
EOSINOPHIL # BLD AUTO: 0.09 10*3/MM3 (ref 0–0.4)
EOSINOPHIL NFR BLD AUTO: 1 % (ref 0.3–6.2)
ERYTHROCYTE [DISTWIDTH] IN BLOOD BY AUTOMATED COUNT: 12.7 % (ref 12.3–15.4)
EXPIRATION DATE: ABNORMAL
GFR SERPL CREATININE-BSD FRML MDRD: 94 ML/MIN/1.73
GLOBULIN UR ELPH-MCNC: 3 GM/DL
GLUCOSE BLD-MCNC: 168 MG/DL (ref 65–99)
GLUCOSE UR STRIP-MCNC: ABNORMAL MG/DL
HCT VFR BLD AUTO: 41.1 % (ref 34–46.6)
HGB BLD-MCNC: 13.9 G/DL (ref 12–15.9)
IMM GRANULOCYTES # BLD AUTO: 0.11 10*3/MM3 (ref 0–0.05)
IMM GRANULOCYTES NFR BLD AUTO: 1.3 % (ref 0–0.5)
KETONES UR QL: NEGATIVE
LEUKOCYTE EST, POC: NEGATIVE
LYMPHOCYTES # BLD AUTO: 1.18 10*3/MM3 (ref 0.7–3.1)
LYMPHOCYTES NFR BLD AUTO: 13.7 % (ref 19.6–45.3)
Lab: ABNORMAL
MCH RBC QN AUTO: 32.1 PG (ref 26.6–33)
MCHC RBC AUTO-ENTMCNC: 33.8 G/DL (ref 31.5–35.7)
MCV RBC AUTO: 94.9 FL (ref 79–97)
MONOCYTES # BLD AUTO: 0.55 10*3/MM3 (ref 0.1–0.9)
MONOCYTES NFR BLD AUTO: 6.4 % (ref 5–12)
NEUTROPHILS # BLD AUTO: 6.63 10*3/MM3 (ref 1.7–7)
NEUTROPHILS NFR BLD AUTO: 77.1 % (ref 42.7–76)
NITRITE UR-MCNC: NEGATIVE MG/ML
NRBC BLD AUTO-RTO: 0 /100 WBC (ref 0–0.2)
PH UR: 6 [PH] (ref 5–8)
PLATELET # BLD AUTO: 307 10*3/MM3 (ref 140–450)
PMV BLD AUTO: 9.4 FL (ref 6–12)
POTASSIUM BLD-SCNC: 3.6 MMOL/L (ref 3.5–5.2)
PROT SERPL-MCNC: 7.2 G/DL (ref 6–8.5)
PROT UR STRIP-MCNC: NEGATIVE MG/DL
RBC # BLD AUTO: 4.33 10*6/MM3 (ref 3.77–5.28)
RBC # UR STRIP: NEGATIVE /UL
SODIUM BLD-SCNC: 142 MMOL/L (ref 136–145)
SP GR UR: 1.01 (ref 1–1.03)
UROBILINOGEN UR QL: NORMAL
WBC NRBC COR # BLD: 8.6 10*3/MM3 (ref 3.4–10.8)

## 2019-11-13 PROCEDURE — 86038 ANTINUCLEAR ANTIBODIES: CPT | Performed by: INTERNAL MEDICINE

## 2019-11-13 PROCEDURE — 85025 COMPLETE CBC W/AUTO DIFF WBC: CPT | Performed by: INTERNAL MEDICINE

## 2019-11-13 PROCEDURE — 99213 OFFICE O/P EST LOW 20 MIN: CPT | Performed by: INTERNAL MEDICINE

## 2019-11-13 PROCEDURE — 36415 COLL VENOUS BLD VENIPUNCTURE: CPT | Performed by: INTERNAL MEDICINE

## 2019-11-13 PROCEDURE — 81003 URINALYSIS AUTO W/O SCOPE: CPT | Performed by: INTERNAL MEDICINE

## 2019-11-13 PROCEDURE — 80053 COMPREHEN METABOLIC PANEL: CPT | Performed by: INTERNAL MEDICINE

## 2019-11-13 PROCEDURE — 86140 C-REACTIVE PROTEIN: CPT | Performed by: INTERNAL MEDICINE

## 2019-11-13 PROCEDURE — 85652 RBC SED RATE AUTOMATED: CPT | Performed by: INTERNAL MEDICINE

## 2019-11-13 NOTE — PROGRESS NOTES
Chief Complaint   Patient presents with   • Rash       History of Present Illness    She presents for an initial evaluation with a rash on the legs. This has been present for two weeks. The condition is non-painful and itchy. There are no exposures to people with similar lesions. There is no history of new cosmetic or detergent usage on the affected area. No prior treatment has been administered. The patient denies a dry cough, a wet cough, wheezing, facial pain, a headache, eye drainage, ear pain, ear drainage or nasal discharge.    Review of Systems    CONSTITUTIONAL- Denies Unexplained Weight Loss, Fever, Chills, Sweats, Fatigue, Weakness or Malaise.    HENT- Denies Sore Throat, Sinus Pain, or Nasal Congestion.    GASTROINTESTINAL- Denies Abdominal Pain, Nausea, Vomiting, Diarrhea, Blood per Rectum, Constipation or Heartburn.    Medications      Current Outpatient Medications:   •  estradiol (ESTRACE) 1 MG tablet, Take 1 tablet by mouth Daily., Disp: 90 tablet, Rfl: 1  •  losartan-hydrochlorothiazide (HYZAAR) 100-25 MG per tablet, TAKE ONE TABLET BY MOUTH DAILY, Disp: 30 tablet, Rfl: 0  •  medroxyPROGESTERone (PROVERA) 5 MG tablet, Take 1 tablet by mouth Daily., Disp: 90 tablet, Rfl: 1  •  pantoprazole (PROTONIX) 40 MG EC tablet, Take 1 tablet by mouth Daily., Disp: 90 tablet, Rfl: 1  •  Triamcinolone Acetonide (NASACORT) 55 MCG/ACT nasal inhaler, 2 sprays into each nostril Daily., Disp: , Rfl:      Allergies    No Known Allergies    Problem List    Patient Active Problem List   Diagnosis   • Benign essential hypertension   • Hearing loss   • Benign paroxysmal positional vertigo   • Influenza       Medications, Allergies, Problems List and Past History were reviewed and updated.    Physical Examination    /80   Pulse 84   Temp 98.6 °F (37 °C) (Temporal)   Resp 20   Wt 64 kg (141 lb)   LMP  (LMP Unknown) Comment: LAST PAP 3/5/18 BY DR DUARTE  BMI 28.00 kg/m²     Dermatologic: The patient has a rash on  the legs. The rash is deep red and purple. The affected skin is non-blanching and the condition is noted to be well demarcated.    Impression and Assessment    Vasculitic Rash.    Plan    Vasculitic Rash Plan: Further plans will be made after the test results are reviewed.    Helga was seen today for rash.    Diagnoses and all orders for this visit:    Vasculitis (CMS/[facility])  -     [place]  -     CBC & Differential  -     Sedimentation Rate  -     Comprehensive Metabolic Panel  -     POC Urinalysis Dipstick, Automated  -     C-reactive Protein  -     CBC Auto Differential        Return to Office    The patient was instructed to return for follow-up at the next scheduled visit.    The patient was instructed to return sooner if the condition changes, worsens, or does not resolve.

## 2019-11-14 LAB — ERYTHROCYTE [SEDIMENTATION RATE] IN BLOOD: 15 MM/HR (ref 0–30)

## 2019-11-15 LAB — ANA SER QL: NEGATIVE

## 2019-12-01 DIAGNOSIS — R73.09 ELEVATED GLUCOSE: Primary | ICD-10-CM

## 2019-12-19 ENCOUNTER — OFFICE VISIT (OUTPATIENT)
Dept: INTERNAL MEDICINE | Facility: CLINIC | Age: 64
End: 2019-12-19

## 2019-12-19 ENCOUNTER — TELEPHONE (OUTPATIENT)
Dept: INTERNAL MEDICINE | Facility: CLINIC | Age: 64
End: 2019-12-19

## 2019-12-19 VITALS
BODY MASS INDEX: 28.07 KG/M2 | DIASTOLIC BLOOD PRESSURE: 80 MMHG | SYSTOLIC BLOOD PRESSURE: 122 MMHG | OXYGEN SATURATION: 97 % | HEIGHT: 60 IN | HEART RATE: 77 BPM | TEMPERATURE: 97.6 F | WEIGHT: 143 LBS | RESPIRATION RATE: 16 BRPM

## 2019-12-19 DIAGNOSIS — J06.9 VIRAL URI WITH COUGH: Primary | ICD-10-CM

## 2019-12-19 DIAGNOSIS — R05.3 PERSISTENT COUGH: ICD-10-CM

## 2019-12-19 PROCEDURE — 99213 OFFICE O/P EST LOW 20 MIN: CPT | Performed by: PHYSICIAN ASSISTANT

## 2019-12-19 NOTE — PROGRESS NOTES
Chief Complaint   Patient presents with   • Cough     x2 weeks, dry, headache, no wheezing, no fever, fatigue       Subjective       History of Present Illness     Helga Cardenas is a 64 y.o. female. She presents with 2 week history of URI symptoms. Pt and her  were seen at [place] on 12/8/2019, dx viral URI. She did not have any further testing or tx at charlene time. Pt states her symptoms have improved in the last 2 weeks, but she still has a rather persistent dry cough with some concern for pertussis. No known pertussis exposure. UTD on Tdap 10/3/2012. She has tried delsym and mucinex with partial relief of symptoms. She also has an intermittent HA and fatigue, but these are also improving. She denies any recent fever, chills, SOA, wheezing or rash. Previous congestion has resolved.       The following portions of the patient's history were reviewed and updated as appropriate: allergies, current medications, past medical history and problem list.    No Known Allergies  Social History     Tobacco Use   • Smoking status: Never Smoker   • Smokeless tobacco: Never Used   Substance Use Topics   • Alcohol use: No         Current Outpatient Medications:   •  estradiol (ESTRACE) 1 MG tablet, Take 1 tablet by mouth Daily., Disp: 90 tablet, Rfl: 1  •  losartan-hydrochlorothiazide (HYZAAR) 100-25 MG per tablet, TAKE ONE TABLET BY MOUTH DAILY, Disp: 30 tablet, Rfl: 0  •  medroxyPROGESTERone (PROVERA) 5 MG tablet, Take 1 tablet by mouth Daily., Disp: 90 tablet, Rfl: 1  •  pantoprazole (PROTONIX) 40 MG EC tablet, Take 1 tablet by mouth Daily., Disp: 90 tablet, Rfl: 1  •  Triamcinolone Acetonide (NASACORT) 55 MCG/ACT nasal inhaler, 2 sprays into each nostril Daily., Disp: , Rfl:     Review of Systems   Constitutional: Positive for fatigue. Negative for chills and fever.   HENT: Positive for congestion and postnasal drip. Negative for ear pain, sneezing, sore throat and trouble swallowing.    Respiratory: Positive for  cough. Negative for shortness of breath and wheezing.    Cardiovascular: Negative for chest pain.   Gastrointestinal: Negative for abdominal pain, nausea and vomiting.   Genitourinary: Negative for dysuria.   Allergic/Immunologic: Negative for immunocompromised state.   Neurological: Positive for headache. Negative for dizziness.       Objective   Vitals:    12/19/19 1607   BP: 122/80   Pulse: 77   Resp: 16   Temp: 97.6 °F (36.4 °C)   SpO2: 97%     Physical Exam   Constitutional: She appears well-developed and well-nourished.   HENT:   Head: Normocephalic and atraumatic.   Right Ear: Tympanic membrane, external ear and ear canal normal.   Left Ear: Tympanic membrane, external ear and ear canal normal.   Nose: Nose normal.   Mouth/Throat: Mucous membranes are normal. No oropharyngeal exudate or posterior oropharyngeal edema.   +cobblestoning appearance of posterior oropharynx   Eyes: Pupils are equal, round, and reactive to light. Conjunctivae are normal.   Neck: Neck supple.   Cardiovascular: Normal rate and regular rhythm.   No murmur heard.  Pulmonary/Chest: Effort normal and breath sounds normal. She has no wheezes. She has no rales.   Lymphadenopathy:     She has no cervical adenopathy.   Psychiatric: She has a normal mood and affect. Her behavior is normal.       Assessment/Plan   Helga was seen today for cough.    Diagnoses and all orders for this visit:    Viral URI with cough    Persistent cough      Low suspicion of pertussis at this time. Discussed PCR testing for pertussis if pt desires this today, but likely low yield particularly as symptoms have improved with OTC tx and no fever. Pt declines further testing for pertussis at this time. As long as she does not have worsening sx and no fever, she is at low risk of transmission of URI at this time.              Return if symptoms worsen or fail to improve.

## 2019-12-19 NOTE — TELEPHONE ENCOUNTER
PT CALLED AND WANTS TO CHECK ON HER IMMUNIZATIONS?  SHE FEELS AS THOUGH THEY HAVE HAD WHOOPING COUGH. THEY HAVE A LOT OF GRANDCHILDREN AND WANT TO MAKE SURE THEY HAVE HAD ALL THEIR VACS.  PT CALL BACK [phone]

## 2019-12-19 NOTE — TELEPHONE ENCOUNTER
Spoke to wife and advised her that her TDaP was up to date. Patient was concerned with having the whooping cough and she says that she has lots of grandchildren coming over for emigdio. Patient and  has been added to the schedule for today.

## 2019-12-28 DIAGNOSIS — I10 BENIGN ESSENTIAL HYPERTENSION: ICD-10-CM

## 2019-12-30 RX ORDER — PANTOPRAZOLE SODIUM 40 MG/1
TABLET, DELAYED RELEASE ORAL
Qty: 30 TABLET | Refills: 0 | Status: SHIPPED | OUTPATIENT
Start: 2019-12-30 | End: 2020-01-27

## 2019-12-30 RX ORDER — LOSARTAN POTASSIUM AND HYDROCHLOROTHIAZIDE 25; 100 MG/1; MG/1
TABLET ORAL
Qty: 30 TABLET | Refills: 0 | Status: SHIPPED | OUTPATIENT
Start: 2019-12-30 | End: 2020-01-27

## 2020-01-02 ENCOUNTER — LAB (OUTPATIENT)
Dept: INTERNAL MEDICINE | Facility: CLINIC | Age: 65
End: 2020-01-02

## 2020-01-02 DIAGNOSIS — R73.09 ELEVATED GLUCOSE: ICD-10-CM

## 2020-01-02 LAB
ANION GAP SERPL CALCULATED.3IONS-SCNC: 13.6 MMOL/L (ref 5–15)
BUN BLD-MCNC: 13 MG/DL (ref 8–23)
BUN/CREAT SERPL: 21 (ref 7–25)
CALCIUM SPEC-SCNC: 9.3 MG/DL (ref 8.6–10.5)
CHLORIDE SERPL-SCNC: 92 MMOL/L (ref 98–107)
CO2 SERPL-SCNC: 26.4 MMOL/L (ref 22–29)
CREAT BLD-MCNC: 0.62 MG/DL (ref 0.57–1)
GFR SERPL CREATININE-BSD FRML MDRD: 97 ML/MIN/1.73
GLUCOSE BLD-MCNC: 105 MG/DL (ref 65–99)
HBA1C MFR BLD: 6.4 % (ref 4.8–5.6)
POTASSIUM BLD-SCNC: 3.7 MMOL/L (ref 3.5–5.2)
SODIUM BLD-SCNC: 132 MMOL/L (ref 136–145)

## 2020-01-02 PROCEDURE — 83036 HEMOGLOBIN GLYCOSYLATED A1C: CPT | Performed by: INTERNAL MEDICINE

## 2020-01-02 PROCEDURE — 36415 COLL VENOUS BLD VENIPUNCTURE: CPT | Performed by: INTERNAL MEDICINE

## 2020-01-02 PROCEDURE — 80048 BASIC METABOLIC PNL TOTAL CA: CPT | Performed by: INTERNAL MEDICINE

## 2020-01-27 DIAGNOSIS — I10 BENIGN ESSENTIAL HYPERTENSION: ICD-10-CM

## 2020-01-27 RX ORDER — PANTOPRAZOLE SODIUM 40 MG/1
TABLET, DELAYED RELEASE ORAL
Qty: 30 TABLET | Refills: 0 | Status: SHIPPED | OUTPATIENT
Start: 2020-01-27 | End: 2020-02-26

## 2020-01-27 RX ORDER — LOSARTAN POTASSIUM AND HYDROCHLOROTHIAZIDE 25; 100 MG/1; MG/1
TABLET ORAL
Qty: 30 TABLET | Refills: 0 | Status: SHIPPED | OUTPATIENT
Start: 2020-01-27 | End: 2020-02-26

## 2020-02-25 ENCOUNTER — OFFICE VISIT (OUTPATIENT)
Dept: INTERNAL MEDICINE | Facility: CLINIC | Age: 65
End: 2020-02-25

## 2020-02-25 VITALS
DIASTOLIC BLOOD PRESSURE: 70 MMHG | SYSTOLIC BLOOD PRESSURE: 128 MMHG | TEMPERATURE: 97.2 F | HEART RATE: 80 BPM | RESPIRATION RATE: 20 BRPM | BODY MASS INDEX: 27.6 KG/M2 | WEIGHT: 139 LBS

## 2020-02-25 DIAGNOSIS — R10.11 RIGHT UPPER QUADRANT ABDOMINAL PAIN: Primary | ICD-10-CM

## 2020-02-25 DIAGNOSIS — R11.2 NON-INTRACTABLE VOMITING WITH NAUSEA, UNSPECIFIED VOMITING TYPE: ICD-10-CM

## 2020-02-25 LAB
ALBUMIN SERPL-MCNC: 4.1 G/DL (ref 3.5–5.2)
ALBUMIN/GLOB SERPL: 1.4 G/DL
ALP SERPL-CCNC: 54 U/L (ref 39–117)
ALT SERPL W P-5'-P-CCNC: 27 U/L (ref 1–33)
ANION GAP SERPL CALCULATED.3IONS-SCNC: 16.2 MMOL/L (ref 5–15)
AST SERPL-CCNC: 25 U/L (ref 1–32)
BASOPHILS # BLD AUTO: 0.02 10*3/MM3 (ref 0–0.2)
BASOPHILS NFR BLD AUTO: 0.3 % (ref 0–1.5)
BILIRUB SERPL-MCNC: 0.3 MG/DL (ref 0.2–1.2)
BUN BLD-MCNC: 12 MG/DL (ref 8–23)
BUN/CREAT SERPL: 20.7 (ref 7–25)
CALCIUM SPEC-SCNC: 9.1 MG/DL (ref 8.6–10.5)
CHLORIDE SERPL-SCNC: 89 MMOL/L (ref 98–107)
CO2 SERPL-SCNC: 26.8 MMOL/L (ref 22–29)
CREAT BLD-MCNC: 0.58 MG/DL (ref 0.57–1)
DEPRECATED RDW RBC AUTO: 41.5 FL (ref 37–54)
EOSINOPHIL # BLD AUTO: 0.04 10*3/MM3 (ref 0–0.4)
EOSINOPHIL NFR BLD AUTO: 0.5 % (ref 0.3–6.2)
ERYTHROCYTE [DISTWIDTH] IN BLOOD BY AUTOMATED COUNT: 12.4 % (ref 12.3–15.4)
GFR SERPL CREATININE-BSD FRML MDRD: 105 ML/MIN/1.73
GLOBULIN UR ELPH-MCNC: 2.9 GM/DL
GLUCOSE BLD-MCNC: 136 MG/DL (ref 65–99)
HCT VFR BLD AUTO: 41.9 % (ref 34–46.6)
HGB BLD-MCNC: 14.3 G/DL (ref 12–15.9)
IMM GRANULOCYTES # BLD AUTO: 0.06 10*3/MM3 (ref 0–0.05)
IMM GRANULOCYTES NFR BLD AUTO: 0.8 % (ref 0–0.5)
LIPASE SERPL-CCNC: 18 U/L (ref 13–60)
LYMPHOCYTES # BLD AUTO: 0.63 10*3/MM3 (ref 0.7–3.1)
LYMPHOCYTES NFR BLD AUTO: 8.2 % (ref 19.6–45.3)
MCH RBC QN AUTO: 31.4 PG (ref 26.6–33)
MCHC RBC AUTO-ENTMCNC: 34.1 G/DL (ref 31.5–35.7)
MCV RBC AUTO: 92.1 FL (ref 79–97)
MONOCYTES # BLD AUTO: 0.67 10*3/MM3 (ref 0.1–0.9)
MONOCYTES NFR BLD AUTO: 8.7 % (ref 5–12)
NEUTROPHILS # BLD AUTO: 6.27 10*3/MM3 (ref 1.7–7)
NEUTROPHILS NFR BLD AUTO: 81.5 % (ref 42.7–76)
NRBC BLD AUTO-RTO: 0 /100 WBC (ref 0–0.2)
PLATELET # BLD AUTO: 292 10*3/MM3 (ref 140–450)
PMV BLD AUTO: 9.3 FL (ref 6–12)
POTASSIUM BLD-SCNC: 2.9 MMOL/L (ref 3.5–5.2)
PROT SERPL-MCNC: 7 G/DL (ref 6–8.5)
RBC # BLD AUTO: 4.55 10*6/MM3 (ref 3.77–5.28)
SODIUM BLD-SCNC: 132 MMOL/L (ref 136–145)
WBC NRBC COR # BLD: 7.69 10*3/MM3 (ref 3.4–10.8)

## 2020-02-25 PROCEDURE — 99214 OFFICE O/P EST MOD 30 MIN: CPT | Performed by: INTERNAL MEDICINE

## 2020-02-25 PROCEDURE — 85025 COMPLETE CBC W/AUTO DIFF WBC: CPT | Performed by: INTERNAL MEDICINE

## 2020-02-25 PROCEDURE — 83690 ASSAY OF LIPASE: CPT | Performed by: INTERNAL MEDICINE

## 2020-02-25 PROCEDURE — 36415 COLL VENOUS BLD VENIPUNCTURE: CPT | Performed by: INTERNAL MEDICINE

## 2020-02-25 PROCEDURE — 80053 COMPREHEN METABOLIC PANEL: CPT | Performed by: INTERNAL MEDICINE

## 2020-02-25 RX ORDER — LEVOCETIRIZINE DIHYDROCHLORIDE 5 MG/1
5 TABLET, FILM COATED ORAL EVERY EVENING
COMMUNITY
End: 2020-09-30

## 2020-02-25 RX ORDER — ONDANSETRON 8 MG/1
8 TABLET, ORALLY DISINTEGRATING ORAL EVERY 8 HOURS PRN
Qty: 30 TABLET | Refills: 0 | Status: SHIPPED | OUTPATIENT
Start: 2020-02-25 | End: 2020-09-30

## 2020-02-25 NOTE — PATIENT INSTRUCTIONS
Recommend clear liquids and bland foods.  Advance diet as tolerated.  Avoid fatty, greasy, or creamy foods.    May increase Pantoprazole to twice daily short term.

## 2020-02-26 DIAGNOSIS — I10 BENIGN ESSENTIAL HYPERTENSION: ICD-10-CM

## 2020-02-26 RX ORDER — LOSARTAN POTASSIUM AND HYDROCHLOROTHIAZIDE 25; 100 MG/1; MG/1
TABLET ORAL
Qty: 30 TABLET | Refills: 5 | Status: SHIPPED | OUTPATIENT
Start: 2020-02-26 | End: 2020-08-31

## 2020-02-26 RX ORDER — PANTOPRAZOLE SODIUM 40 MG/1
TABLET, DELAYED RELEASE ORAL
Qty: 30 TABLET | Refills: 5 | Status: SHIPPED | OUTPATIENT
Start: 2020-02-26 | End: 2020-08-31

## 2020-02-28 ENCOUNTER — HOSPITAL ENCOUNTER (OUTPATIENT)
Dept: ULTRASOUND IMAGING | Facility: HOSPITAL | Age: 65
Discharge: HOME | End: 2020-02-28
Admitting: INTERNAL MEDICINE

## 2020-02-28 DIAGNOSIS — R11.2 NON-INTRACTABLE VOMITING WITH NAUSEA, UNSPECIFIED VOMITING TYPE: ICD-10-CM

## 2020-02-28 DIAGNOSIS — R10.11 RIGHT UPPER QUADRANT ABDOMINAL PAIN: ICD-10-CM

## 2020-02-28 PROCEDURE — 76705 ECHO EXAM OF ABDOMEN: CPT

## 2020-03-02 DIAGNOSIS — K81.1 CHRONIC CHOLECYSTITIS: Primary | ICD-10-CM

## 2020-03-02 DIAGNOSIS — R10.11 RIGHT UPPER QUADRANT ABDOMINAL PAIN: ICD-10-CM

## 2020-03-30 ENCOUNTER — OFFICE VISIT (OUTPATIENT)
Dept: INTERNAL MEDICINE | Facility: CLINIC | Age: 65
End: 2020-03-30

## 2020-03-30 VITALS
OXYGEN SATURATION: 99 % | TEMPERATURE: 97.8 F | RESPIRATION RATE: 20 BRPM | DIASTOLIC BLOOD PRESSURE: 72 MMHG | BODY MASS INDEX: 27.95 KG/M2 | SYSTOLIC BLOOD PRESSURE: 140 MMHG | WEIGHT: 142.38 LBS | HEART RATE: 66 BPM | HEIGHT: 60 IN

## 2020-03-30 DIAGNOSIS — Z00.00 PHYSICAL EXAM: Primary | ICD-10-CM

## 2020-03-30 DIAGNOSIS — I10 ESSENTIAL HYPERTENSION: ICD-10-CM

## 2020-03-30 DIAGNOSIS — K21.9 GASTROESOPHAGEAL REFLUX DISEASE, ESOPHAGITIS PRESENCE NOT SPECIFIED: ICD-10-CM

## 2020-03-30 DIAGNOSIS — R73.02 IMPAIRED GLUCOSE TOLERANCE: ICD-10-CM

## 2020-03-30 LAB
BILIRUB BLD-MCNC: NEGATIVE MG/DL
CLARITY, POC: CLEAR
COLOR UR: YELLOW
EXPIRATION DATE: NORMAL
GLUCOSE UR STRIP-MCNC: NEGATIVE MG/DL
KETONES UR QL: NEGATIVE
LEUKOCYTE EST, POC: NEGATIVE
Lab: NORMAL
NITRITE UR-MCNC: NEGATIVE MG/ML
PH UR: 8 [PH] (ref 5–8)
PROT UR STRIP-MCNC: NEGATIVE MG/DL
RBC # UR STRIP: NEGATIVE /UL
SP GR UR: 1.01 (ref 1–1.03)
UROBILINOGEN UR QL: NORMAL

## 2020-03-30 PROCEDURE — 81003 URINALYSIS AUTO W/O SCOPE: CPT | Performed by: INTERNAL MEDICINE

## 2020-03-30 PROCEDURE — 85025 COMPLETE CBC W/AUTO DIFF WBC: CPT | Performed by: INTERNAL MEDICINE

## 2020-03-30 PROCEDURE — 36415 COLL VENOUS BLD VENIPUNCTURE: CPT | Performed by: INTERNAL MEDICINE

## 2020-03-30 PROCEDURE — 99396 PREV VISIT EST AGE 40-64: CPT | Performed by: INTERNAL MEDICINE

## 2020-03-30 PROCEDURE — 80061 LIPID PANEL: CPT | Performed by: INTERNAL MEDICINE

## 2020-03-30 PROCEDURE — 84443 ASSAY THYROID STIM HORMONE: CPT | Performed by: INTERNAL MEDICINE

## 2020-03-30 PROCEDURE — 80053 COMPREHEN METABOLIC PANEL: CPT | Performed by: INTERNAL MEDICINE

## 2020-03-30 PROCEDURE — 82306 VITAMIN D 25 HYDROXY: CPT | Performed by: INTERNAL MEDICINE

## 2020-03-30 PROCEDURE — 83036 HEMOGLOBIN GLYCOSYLATED A1C: CPT | Performed by: INTERNAL MEDICINE

## 2020-03-30 NOTE — PROGRESS NOTES
Chief Complaint   Patient presents with   • Annual Exam     Fasting physical       History of Present Illness      The patient presents for an established patient physical examination and health maintenance visit.    The patient presents for a follow-up related to GERD. The patient is on pantoprazole for her gastroesophageal reflux. The medication is taken on a regular basis and gives complete relief of the symptoms. She reports no abdominal pain, belching, chest pain, diarrhea, dysphagia, early satiety, heartburn, hoarseness, nausea, odynophagia, rectal bleeding, vomiting or weight loss. The GERD has no known aggravating factors.    The patient presents for a follow-up related to hypertension. The patient reports that she has had no headaches, dyspnea, edema, syncope, blurred vision or palpitations. She states that she is taking her medication as prescribed. She is not having medication side effects.    The patient presents for a follow-up related to impaired glucose tolerance and reports that she doesn't check her blood sugars at home. She denies hypoglycemic symptoms. The patient denies polyuria, polydypsia or polyphagia. She reports no symptoms of neuropathy. The patient is not on medication for her impaired glucose tolerance. The patient does check her feet daily at home. She denies orthopnea, paroxysmal nocturnal dyspnea or dyspnea on exertion.    Review of Systems    CONSTITUTIONAL- Denies Fever, Chills, Sweats, Fatigue, Weakness or Malaise.    NECK- Denies Decreased Range of Motion, Stiffness, Thyroid Nodules, Enlarged Lymph Nodes or Goiter.    EYES- Denies Eye Pain, Eye Drainage, Eye Redness, Eye Discharge, Decreased Vision, Visual Disturbance, Diplopia, Visual Loss or Swollen Eyelid.    HENT- Denies Nasal Discharge, Sore Throat, Ear Pain, Ear Drainage, Sinus Pain, Nasal Congestion, Decreased Hearing or Tinnitus.    PULMONARY- Denies Wheezing, Sputum Production, Cough, Hemoptysis or Pleuritic Chest  Pain.    GASTROINTESTINAL- Denies Constipation.    CARDIOVASCULAR- Denies Claudication or Irregular Heart Beat.    GENITOURINARY- Denies Dysuria, Hematuria, Urinary Frequency, Urinary Leakage, Pelvic Pain, Vaginal Prolapse, Vaginal Discharge, Dyspareunia or Abnormal Menses.    ENDOCRINE- Denies Cold Intolerance, Depression, Hair Loss, Heat Intolerance, Memory Loss, Sleep Disturbance or Weight Gain.    NEUROLOGIC- Denies Seizures, Confusion or Excessive Daytime Sleepiness.    MUSCULOSKELETAL- Denies Joint Pain, Joint Stiffness, Decreased Range of Motion, Joint Swelling or Erythema of Joints.    INTEGUMENTARY- Denies Rash, Lumps, Sores, Itching, Dryness, Color Change, Changes in Hair, Brittle Nails, Discolored Nails, Thickened Nails, Growths or Alopecia.    Medications      Current Outpatient Medications:   •  estradiol (ESTRACE) 1 MG tablet, Take 1 tablet by mouth Daily., Disp: 90 tablet, Rfl: 1  •  levocetirizine (XYZAL) 5 MG tablet, Take 5 mg by mouth Every Evening., Disp: , Rfl:   •  losartan-hydrochlorothiazide (HYZAAR) 100-25 MG per tablet, TAKE ONE TABLET BY MOUTH DAILY, Disp: 30 tablet, Rfl: 5  •  medroxyPROGESTERone (PROVERA) 5 MG tablet, Take 1 tablet by mouth Daily., Disp: 90 tablet, Rfl: 1  •  ondansetron ODT (ZOFRAN ODT) 8 MG disintegrating tablet, Take 1 tablet by mouth Every 8 (Eight) Hours As Needed for Nausea or Vomiting., Disp: 30 tablet, Rfl: 0  •  pantoprazole (PROTONIX) 40 MG EC tablet, TAKE ONE TABLET BY MOUTH DAILY, Disp: 30 tablet, Rfl: 5  •  Triamcinolone Acetonide (NASACORT) 55 MCG/ACT nasal inhaler, 2 sprays into each nostril Daily., Disp: , Rfl:      Allergies    No Known Allergies    Problem List    Patient Active Problem List   Diagnosis   • Benign essential hypertension   • Hearing loss   • Benign paroxysmal positional vertigo       Medications, Allergies, Problems List and Past History were reviewed and updated.    Physical Examination    /72 (BP Location: Right arm, Patient  "Position: Sitting)   Pulse 66   Temp 97.8 °F (36.6 °C) (Temporal)   Resp 20   Ht 152.4 cm (60\")   Wt 64.6 kg (142 lb 6 oz)   LMP  (LMP Unknown) Comment: LAST PAP 3/5/18 BY DR DUARTE  SpO2 99%   Breastfeeding No   BMI 27.81 kg/m²     HEENT: Head- Normocephalic Atraumatic. Facies- Within normal limits. Pinnas- Normal texture and shape bilaterally. Canals- Normal bilaterally. TMs- Normal bilaterally. Nares- Patent bilaterally. Nasal Septum- is normal. There is no tenderness to palpation over the frontal or maxillary sinuses. Lids- Normal bilaterally. Conjunctiva- Clear bilaterally. Sclera- Anicteric bilaterally. Oropharynx- Moist with no lesions. Tonsils- No enlargement, erythema or exudate.    Neck: Thyroid- non enlarged, symmetric and has no nodules. No bruits are detected. ROM- Normal Range of Motion with no rigidity.    Lungs: Auscultation- Clear to auscultation bilaterally. There are no retractions, clubbing or cyanosis. The Expiratory to Inspiratory ratio is equal.    Lymph Nodes: Cervical- no enlarged lymph nodes noted. Clavicular- no enlarged supraclavicular lymph nodes noted. Axillary- no enlarged axillary lymph nodes noted. Inguinal- no enlarged inguinal lymph nodes noted.    Cardiovascular: There are no carotid bruits. Heart- Normal Rate with Regular rhythm and no murmurs. There are no gallops. There are no rubs. In the lower extremities there is no edema. The upper extremities do not have edema.    Abdomen: Soft, benign, non-tender with no masses, hernias, organomegaly or scars.    Breast: Normal contours bilaterally with no masses, discharge, skin changes or lumps. There are no scars noted.    Dermatologic: The patient has no worrisome or suspicious skin lesions noted.    Impression and Assessment    Normal Physical Examination.    Gastroesophageal Reflux Disease.    Essential Hypertension.    Impaired Glucose Tolerance.    Plan    Gastroesophageal Reflux Disease Plan: The current plan was " continued.    Essential Hypertension Plan: The current plan was continued.    Impaired Glucose Tolerance Plan: Further plans will be formulated after test results are reviewed.    Counseling was provided regarding: Adequate Aerobic Exercise, Dental Visits, Flossing Teeth and Heart Healthy Diet.    The following was ordered for screening and health maintenance: CBC w Automated Diff, CMP, Lipid Profile, TSH and U/A.       Immunizations Ordered and Administered: None.    Helga was seen today for annual exam.    Diagnoses and all orders for this visit:    Physical exam  -     CBC & Differential  -     Comprehensive Metabolic Panel  -     Lipid Panel  -     POC Urinalysis Dipstick, Automated  -     TSH  -     Vitamin D 25 Hydroxy    Gastroesophageal reflux disease, esophagitis presence not specified    Essential hypertension  -     Comprehensive Metabolic Panel    Impaired glucose tolerance  -     Comprehensive Metabolic Panel  -     Hemoglobin A1c          Return to Office    The patient was instructed to return for follow-up in 6 months. Next Visit: Follow-up.    The patient was instructed to return sooner if the condition changes, worsens, or does not resolve.

## 2020-03-31 LAB
25(OH)D3 SERPL-MCNC: 35.8 NG/ML (ref 30–100)
ALBUMIN SERPL-MCNC: 4.3 G/DL (ref 3.5–5.2)
ALBUMIN/GLOB SERPL: 1.7 G/DL
ALP SERPL-CCNC: 57 U/L (ref 39–117)
ALT SERPL W P-5'-P-CCNC: 22 U/L (ref 1–33)
ANION GAP SERPL CALCULATED.3IONS-SCNC: 12 MMOL/L (ref 5–15)
AST SERPL-CCNC: 24 U/L (ref 1–32)
BASOPHILS # BLD AUTO: 0.03 10*3/MM3 (ref 0–0.2)
BASOPHILS NFR BLD AUTO: 0.7 % (ref 0–1.5)
BILIRUB SERPL-MCNC: 0.4 MG/DL (ref 0.2–1.2)
BUN BLD-MCNC: 9 MG/DL (ref 8–23)
BUN/CREAT SERPL: 16.1 (ref 7–25)
CALCIUM SPEC-SCNC: 9.5 MG/DL (ref 8.6–10.5)
CHLORIDE SERPL-SCNC: 93 MMOL/L (ref 98–107)
CHOLEST SERPL-MCNC: 154 MG/DL (ref 0–200)
CO2 SERPL-SCNC: 27 MMOL/L (ref 22–29)
CREAT BLD-MCNC: 0.56 MG/DL (ref 0.57–1)
DEPRECATED RDW RBC AUTO: 45 FL (ref 37–54)
EOSINOPHIL # BLD AUTO: 0.07 10*3/MM3 (ref 0–0.4)
EOSINOPHIL NFR BLD AUTO: 1.6 % (ref 0.3–6.2)
ERYTHROCYTE [DISTWIDTH] IN BLOOD BY AUTOMATED COUNT: 13.1 % (ref 12.3–15.4)
GFR SERPL CREATININE-BSD FRML MDRD: 109 ML/MIN/1.73
GLOBULIN UR ELPH-MCNC: 2.5 GM/DL
GLUCOSE BLD-MCNC: 119 MG/DL (ref 65–99)
HBA1C MFR BLD: 6.35 % (ref 4.8–5.6)
HCT VFR BLD AUTO: 39.3 % (ref 34–46.6)
HDLC SERPL-MCNC: 60 MG/DL (ref 40–60)
HGB BLD-MCNC: 13.2 G/DL (ref 12–15.9)
IMM GRANULOCYTES # BLD AUTO: 0.06 10*3/MM3 (ref 0–0.05)
IMM GRANULOCYTES NFR BLD AUTO: 1.3 % (ref 0–0.5)
LDLC SERPL CALC-MCNC: 80 MG/DL (ref 0–100)
LDLC/HDLC SERPL: 1.33 {RATIO}
LYMPHOCYTES # BLD AUTO: 0.82 10*3/MM3 (ref 0.7–3.1)
LYMPHOCYTES NFR BLD AUTO: 18.4 % (ref 19.6–45.3)
MCH RBC QN AUTO: 31.4 PG (ref 26.6–33)
MCHC RBC AUTO-ENTMCNC: 33.6 G/DL (ref 31.5–35.7)
MCV RBC AUTO: 93.3 FL (ref 79–97)
MONOCYTES # BLD AUTO: 0.65 10*3/MM3 (ref 0.1–0.9)
MONOCYTES NFR BLD AUTO: 14.6 % (ref 5–12)
NEUTROPHILS # BLD AUTO: 2.82 10*3/MM3 (ref 1.7–7)
NEUTROPHILS NFR BLD AUTO: 63.4 % (ref 42.7–76)
NRBC BLD AUTO-RTO: 0 /100 WBC (ref 0–0.2)
PLATELET # BLD AUTO: 278 10*3/MM3 (ref 140–450)
PMV BLD AUTO: 9.3 FL (ref 6–12)
POTASSIUM BLD-SCNC: 4.2 MMOL/L (ref 3.5–5.2)
PROT SERPL-MCNC: 6.8 G/DL (ref 6–8.5)
RBC # BLD AUTO: 4.21 10*6/MM3 (ref 3.77–5.28)
SODIUM BLD-SCNC: 132 MMOL/L (ref 136–145)
TRIGL SERPL-MCNC: 70 MG/DL (ref 0–150)
TSH SERPL DL<=0.05 MIU/L-ACNC: 2.47 UIU/ML (ref 0.27–4.2)
VLDLC SERPL-MCNC: 14 MG/DL (ref 5–40)
WBC NRBC COR # BLD: 4.45 10*3/MM3 (ref 3.4–10.8)

## 2020-05-11 ENCOUNTER — TRANSCRIBE ORDERS (OUTPATIENT)
Dept: ADMINISTRATIVE | Facility: HOSPITAL | Age: 65
End: 2020-05-11

## 2020-05-11 DIAGNOSIS — Z12.31 VISIT FOR SCREENING MAMMOGRAM: Primary | ICD-10-CM

## 2020-08-12 ENCOUNTER — HOSPITAL ENCOUNTER (OUTPATIENT)
Dept: MAMMOGRAPHY | Facility: HOSPITAL | Age: 65
Discharge: HOME | End: 2020-08-12
Admitting: OBSTETRICS & GYNECOLOGY

## 2020-08-12 DIAGNOSIS — Z12.31 VISIT FOR SCREENING MAMMOGRAM: ICD-10-CM

## 2020-08-12 PROCEDURE — 77063 BREAST TOMOSYNTHESIS BI: CPT

## 2020-08-12 PROCEDURE — 77067 SCR MAMMO BI INCL CAD: CPT

## 2020-08-12 PROCEDURE — 77063 BREAST TOMOSYNTHESIS BI: CPT | Performed by: RADIOLOGY

## 2020-08-12 PROCEDURE — 77067 SCR MAMMO BI INCL CAD: CPT | Performed by: RADIOLOGY

## 2020-08-31 DIAGNOSIS — I10 BENIGN ESSENTIAL HYPERTENSION: ICD-10-CM

## 2020-08-31 RX ORDER — LOSARTAN POTASSIUM AND HYDROCHLOROTHIAZIDE 25; 100 MG/1; MG/1
TABLET ORAL
Qty: 90 TABLET | Refills: 1 | Status: SHIPPED | OUTPATIENT
Start: 2020-08-31 | End: 2021-02-19 | Stop reason: SDUPTHER

## 2020-08-31 RX ORDER — PANTOPRAZOLE SODIUM 40 MG/1
TABLET, DELAYED RELEASE ORAL
Qty: 90 TABLET | Refills: 1 | Status: SHIPPED | OUTPATIENT
Start: 2020-08-31 | End: 2021-02-19 | Stop reason: SDUPTHER

## 2020-09-30 ENCOUNTER — OFFICE VISIT (OUTPATIENT)
Dept: INTERNAL MEDICINE | Facility: CLINIC | Age: 65
End: 2020-09-30

## 2020-09-30 VITALS
RESPIRATION RATE: 18 BRPM | TEMPERATURE: 96.9 F | HEART RATE: 72 BPM | WEIGHT: 147 LBS | BODY MASS INDEX: 28.71 KG/M2 | SYSTOLIC BLOOD PRESSURE: 126 MMHG | DIASTOLIC BLOOD PRESSURE: 76 MMHG

## 2020-09-30 DIAGNOSIS — R73.02 IMPAIRED GLUCOSE TOLERANCE: ICD-10-CM

## 2020-09-30 DIAGNOSIS — I10 ESSENTIAL HYPERTENSION: Primary | ICD-10-CM

## 2020-09-30 DIAGNOSIS — K21.9 GASTROESOPHAGEAL REFLUX DISEASE, ESOPHAGITIS PRESENCE NOT SPECIFIED: ICD-10-CM

## 2020-09-30 LAB — HBA1C MFR BLD: 6.59 % (ref 4.8–5.6)

## 2020-09-30 PROCEDURE — 83036 HEMOGLOBIN GLYCOSYLATED A1C: CPT | Performed by: INTERNAL MEDICINE

## 2020-09-30 PROCEDURE — 36415 COLL VENOUS BLD VENIPUNCTURE: CPT | Performed by: INTERNAL MEDICINE

## 2020-09-30 PROCEDURE — 99214 OFFICE O/P EST MOD 30 MIN: CPT | Performed by: INTERNAL MEDICINE

## 2020-09-30 RX ORDER — CETIRIZINE HYDROCHLORIDE 10 MG/1
10 TABLET ORAL DAILY
COMMUNITY
End: 2022-03-14 | Stop reason: HOSPADM

## 2020-09-30 NOTE — PROGRESS NOTES
Chief Complaint   Patient presents with   • Follow-up     6 month follow up chronic medical problems       History of Present Illness      The patient presents for a follow-up related to hypertension. The patient reports that she has had no headaches, chest pain, dyspnea, edema, syncope, blurred vision or palpitations. She states that she is taking her medication as prescribed. She is not having medication side effects.    The patient presents for a follow-up related to GERD. The patient is on pantoprazole for her gastroesophageal reflux. The medication is taken on a regular basis and gives complete relief of the symptoms. She reports no abdominal pain, belching, diarrhea, dysphagia, early satiety, heartburn, hoarseness, nausea, odynophagia, rectal bleeding, vomiting or weight loss. The GERD has no known aggravating factors.    The patient presents for a follow-up related to impaired glucose tolerance. She does not check her blood sugars at home. She denies hypoglycemic symptoms. The patient denies polyuria, polydypsia or polyphagia. She reports no symptoms of neuropathy. The patient is not on medication for her impaired glucose tolerance. The patient does check her feet daily at home. She denies orthopnea, paroxysmal nocturnal dyspnea or dyspnea on exertion.    Review of Systems    CONSTITUTIONAL- Denies Fever, Chills, Sweats, Fatigue, Weakness or Malaise.    HENT- Denies Nasal Discharge, Sore Throat, Ear Pain, Ear Drainage, Sinus Pain, Nasal Congestion, Decreased Hearing or Tinnitus.    GASTROINTESTINAL- Denies Constipation.    PULMONARY- Denies Wheezing, Sputum Production, Cough, Hemoptysis or Pleuritic Chest Pain.    CARDIOVASCULAR- Denies Claudication or Irregular Heart Beat.    Medications      Current Outpatient Medications:   •  cetirizine (zyrTEC) 10 MG tablet, Take 10 mg by mouth Daily., Disp: , Rfl:   •  estradiol (ESTRACE) 1 MG tablet, Take 1 tablet by mouth Daily., Disp: 90 tablet, Rfl: 1  •   losartan-hydrochlorothiazide (HYZAAR) 100-25 MG per tablet, TAKE ONE TABLET BY MOUTH DAILY, Disp: 90 tablet, Rfl: 1  •  pantoprazole (PROTONIX) 40 MG EC tablet, TAKE ONE TABLET BY MOUTH DAILY, Disp: 90 tablet, Rfl: 1  •  Triamcinolone Acetonide (NASACORT) 55 MCG/ACT nasal inhaler, 2 sprays into the nostril(s) as directed by provider Daily As Needed., Disp: , Rfl:      Allergies    No Known Allergies    Problem List    Patient Active Problem List   Diagnosis   • Benign essential hypertension   • Hearing loss   • Benign paroxysmal positional vertigo       Medications, Allergies, Problems List and Past History were reviewed and updated.    Physical Examination    /76   Pulse 72   Temp 96.9 °F (36.1 °C) (Infrared)   Resp 18   Wt 66.7 kg (147 lb)   LMP  (LMP Unknown) Comment: LAST PAP 5/2020 BY DR DUARTE  Breastfeeding No   BMI 28.71 kg/m²     HEENT: Head- Normocephalic Atraumatic. Facies- Within normal limits. Pinnas- Normal texture and shape bilaterally. Canals- Normal bilaterally. TMs- Normal bilaterally. Nares- Patent bilaterally. Nasal Septum- is normal. There is no tenderness to palpation over the frontal or maxillary sinuses. Lids- Normal bilaterally. Conjunctiva- Clear bilaterally. Sclera- Anicteric bilaterally. Oropharynx- Moist with no lesions. Tonsils- No enlargement, erythema or exudate.    Neck: Thyroid- non enlarged, symmetric and has no nodules. No bruits are detected.    Lungs: Auscultation- Clear to auscultation bilaterally. There are no retractions, clubbing or cyanosis. The Expiratory to Inspiratory ratio is equal.    Cardiovascular: There are no carotid bruits. Heart- Normal Rate with Regular rhythm and no murmurs. There are no gallops. There are no rubs. In the lower extremities there is no edema. The upper extremities do not have edema.    Abdomen: Soft, benign, non-tender with no masses, hernias, organomegaly or scars.    Impression and Assessment    Essential  Hypertension.    Gastroesophageal Reflux Disease.    Impaired Glucose Tolerance.    Plan    Gastroesophageal Reflux Disease Plan: The current plan was continued.    Essential Hypertension Plan: The current plan was continued.    Impaired Glucose Tolerance Plan: Further plans will be formulated after test results are reviewed.    Helga was seen today for follow-up.    Diagnoses and all orders for this visit:    Essential hypertension    Gastroesophageal reflux disease, esophagitis presence not specified    Impaired glucose tolerance  -     Hemoglobin A1c          Return to Office    The patient was instructed to return for follow-up in 6 months.    The patient was instructed to return sooner if the condition changes, worsens, or does not resolve.

## 2020-12-29 ENCOUNTER — TELEPHONE (OUTPATIENT)
Dept: INTERNAL MEDICINE | Facility: CLINIC | Age: 65
End: 2020-12-29

## 2020-12-29 DIAGNOSIS — R73.02 IMPAIRED GLUCOSE TOLERANCE: Primary | ICD-10-CM

## 2020-12-29 NOTE — TELEPHONE ENCOUNTER
PATIENT IS WANTING TO KNOW IF DR HO WILL REFER HER TO PRE-DIABETIC EDUCATION AT [facility]. PLEASE CALL PT TO LET HER KNOW. PT CAN BE REACHED [phone]

## 2020-12-30 NOTE — TELEPHONE ENCOUNTER
I have put in the referral.  Please let her know and schedule this.  Rey Desouza MD  17:06 EST  12/30/20

## 2021-01-11 ENCOUNTER — HOSPITAL ENCOUNTER (OUTPATIENT)
Dept: DIABETES SERVICES | Facility: HOSPITAL | Age: 66
Setting detail: RECURRING SERIES
Discharge: HOME | End: 2021-01-11

## 2021-01-11 NOTE — CONSULTS
Diabetes Education    Patient Name:  Helga Cardenas  YOB: 1955  MRN: [id number]  Admit Date:  1/11/2021        Ms. Cardenas completed prediabetes education visit on 1/11/21, 90 minute visit.  This medical referred consult was provided as a telephone call, tele-health or e-visit, as patient is unable to attend an in-office appointment due to the COVID-19 crisis. Consent for treatment was given verbally.Please see media tab for assessment and notes if you use [facility]. If you are not an [facility] user a copy of patient's assessment and notes will be sent per routine. Thank you.       Electronically signed by:  Carol Dawson RN  01/11/21 09:40 EST

## 2021-02-12 DIAGNOSIS — I10 BENIGN ESSENTIAL HYPERTENSION: ICD-10-CM

## 2021-02-12 RX ORDER — PANTOPRAZOLE SODIUM 40 MG/1
TABLET, DELAYED RELEASE ORAL
Qty: 30 TABLET | Refills: 0 | OUTPATIENT
Start: 2021-02-12

## 2021-02-12 RX ORDER — LOSARTAN POTASSIUM AND HYDROCHLOROTHIAZIDE 25; 100 MG/1; MG/1
TABLET ORAL
Qty: 30 TABLET | Refills: 0 | OUTPATIENT
Start: 2021-02-12

## 2021-02-15 ENCOUNTER — HOSPITAL ENCOUNTER (OUTPATIENT)
Dept: DIABETES SERVICES | Facility: HOSPITAL | Age: 66
Setting detail: RECURRING SERIES
Discharge: HOME | End: 2021-02-15

## 2021-02-15 NOTE — CONSULTS
Adult Outpatient Diabetes and Nutrition    Patient Name:  Helga Cardenas  YOB: 1955  MRN: [id number]    Assessment Date:  2/15/2021    Comments:        Ms. Cardenas completed a follow up prediabetes education visit on 02/15/21, 30 minute visit.  This medical referred consult was provided via zoom as patient is unable to attend an in-office appointment due to the COVID-19 crisis. Consent for treatment was given verbally.Please see media tab for assessment and notes if you use [facility]. If you are not an [facility] user a copy of patient's assessment and notes will be sent per routine. Thank you.       Electronically signed by:  Elle Borges RD  02/15/21 11:04 EST

## 2021-02-19 ENCOUNTER — PATIENT MESSAGE (OUTPATIENT)
Dept: INTERNAL MEDICINE | Facility: CLINIC | Age: 66
End: 2021-02-19

## 2021-02-19 DIAGNOSIS — I10 BENIGN ESSENTIAL HYPERTENSION: ICD-10-CM

## 2021-02-19 RX ORDER — LOSARTAN POTASSIUM AND HYDROCHLOROTHIAZIDE 25; 100 MG/1; MG/1
1 TABLET ORAL DAILY
Qty: 90 TABLET | Refills: 1 | Status: SHIPPED | OUTPATIENT
Start: 2021-02-19 | End: 2021-08-12 | Stop reason: SDUPTHER

## 2021-02-19 RX ORDER — PANTOPRAZOLE SODIUM 40 MG/1
40 TABLET, DELAYED RELEASE ORAL DAILY
Qty: 90 TABLET | Refills: 1 | Status: SHIPPED | OUTPATIENT
Start: 2021-02-19 | End: 2021-08-12 | Stop reason: SDUPTHER

## 2021-02-19 NOTE — TELEPHONE ENCOUNTER
From: Helga Cardenas  To: Rey Desouza MD  Sent: 2/19/2021 8:51 AM EST  Subject: Prescription Question    May I please have a refill on the Losartan-HCTZ-25mg and Pantoprazole 40mg . I have a 6mo. check-up scheduled for April 6th at 8:15. [facility]. If I need to come in before the appointment please advise.   Thank you.   Helga Cardenas

## 2021-04-06 ENCOUNTER — OFFICE VISIT (OUTPATIENT)
Dept: INTERNAL MEDICINE | Facility: CLINIC | Age: 66
End: 2021-04-06

## 2021-04-06 VITALS
SYSTOLIC BLOOD PRESSURE: 138 MMHG | HEIGHT: 59 IN | WEIGHT: 143 LBS | TEMPERATURE: 97.7 F | BODY MASS INDEX: 28.83 KG/M2 | DIASTOLIC BLOOD PRESSURE: 74 MMHG | RESPIRATION RATE: 18 BRPM | HEART RATE: 72 BPM

## 2021-04-06 DIAGNOSIS — I10 ESSENTIAL HYPERTENSION: ICD-10-CM

## 2021-04-06 DIAGNOSIS — K21.9 GASTROESOPHAGEAL REFLUX DISEASE WITHOUT ESOPHAGITIS: ICD-10-CM

## 2021-04-06 DIAGNOSIS — R73.02 IMPAIRED GLUCOSE TOLERANCE: ICD-10-CM

## 2021-04-06 DIAGNOSIS — Z00.00 PHYSICAL EXAM: Primary | ICD-10-CM

## 2021-04-06 LAB
25(OH)D3 SERPL-MCNC: 33.5 NG/ML
A/C: NORMAL
ALBUMIN SERPL-MCNC: 4.2 G/DL (ref 3.5–5.2)
ALBUMIN/GLOB SERPL: 1.4 G/DL
ALP SERPL-CCNC: 60 U/L (ref 39–117)
ALT SERPL W P-5'-P-CCNC: 18 U/L (ref 1–33)
ANION GAP SERPL CALCULATED.3IONS-SCNC: 12.5 MMOL/L (ref 5–15)
AST SERPL-CCNC: 17 U/L (ref 1–32)
BASOPHILS # BLD AUTO: 0.04 10*3/MM3 (ref 0–0.2)
BASOPHILS NFR BLD AUTO: 0.6 % (ref 0–1.5)
BILIRUB BLD-MCNC: NEGATIVE MG/DL
BILIRUB SERPL-MCNC: 0.5 MG/DL (ref 0–1.2)
BUN SERPL-MCNC: 15 MG/DL (ref 8–23)
BUN/CREAT SERPL: 24.6 (ref 7–25)
CALCIUM SPEC-SCNC: 9.2 MG/DL (ref 8.6–10.5)
CHLORIDE SERPL-SCNC: 95 MMOL/L (ref 98–107)
CHOLEST SERPL-MCNC: 160 MG/DL (ref 0–200)
CLARITY, POC: CLEAR
CO2 SERPL-SCNC: 29.5 MMOL/L (ref 22–29)
COLOR UR: YELLOW
CREAT SERPL-MCNC: 0.61 MG/DL (ref 0.57–1)
DEPRECATED RDW RBC AUTO: 41.5 FL (ref 37–54)
EOSINOPHIL # BLD AUTO: 0.11 10*3/MM3 (ref 0–0.4)
EOSINOPHIL NFR BLD AUTO: 1.7 % (ref 0.3–6.2)
ERYTHROCYTE [DISTWIDTH] IN BLOOD BY AUTOMATED COUNT: 12.4 % (ref 12.3–15.4)
EXPIRATION DATE: ABNORMAL
EXPIRATION DATE: NORMAL
GFR SERPL CREATININE-BSD FRML MDRD: 99 ML/MIN/1.73
GLOBULIN UR ELPH-MCNC: 2.9 GM/DL
GLUCOSE SERPL-MCNC: 101 MG/DL (ref 65–99)
GLUCOSE UR STRIP-MCNC: NEGATIVE MG/DL
HBA1C MFR BLD: 6.44 % (ref 4.8–5.6)
HCT VFR BLD AUTO: 40.7 % (ref 34–46.6)
HDLC SERPL-MCNC: 55 MG/DL (ref 40–60)
HGB BLD-MCNC: 13.7 G/DL (ref 12–15.9)
IMM GRANULOCYTES # BLD AUTO: 0.07 10*3/MM3 (ref 0–0.05)
IMM GRANULOCYTES NFR BLD AUTO: 1.1 % (ref 0–0.5)
KETONES UR QL: ABNORMAL
LDLC SERPL CALC-MCNC: 89 MG/DL (ref 0–100)
LDLC/HDLC SERPL: 1.59 {RATIO}
LEUKOCYTE EST, POC: NEGATIVE
LYMPHOCYTES # BLD AUTO: 0.51 10*3/MM3 (ref 0.7–3.1)
LYMPHOCYTES NFR BLD AUTO: 8.1 % (ref 19.6–45.3)
Lab: ABNORMAL
Lab: NORMAL
MCH RBC QN AUTO: 31 PG (ref 26.6–33)
MCHC RBC AUTO-ENTMCNC: 33.7 G/DL (ref 31.5–35.7)
MCV RBC AUTO: 92.1 FL (ref 79–97)
MONOCYTES # BLD AUTO: 0.55 10*3/MM3 (ref 0.1–0.9)
MONOCYTES NFR BLD AUTO: 8.7 % (ref 5–12)
NEUTROPHILS NFR BLD AUTO: 5.04 10*3/MM3 (ref 1.7–7)
NEUTROPHILS NFR BLD AUTO: 79.8 % (ref 42.7–76)
NITRITE UR-MCNC: NEGATIVE MG/ML
NRBC BLD AUTO-RTO: 0 /100 WBC (ref 0–0.2)
PH UR: 6.5 [PH] (ref 5–8)
PLATELET # BLD AUTO: 329 10*3/MM3 (ref 140–450)
PMV BLD AUTO: 9.1 FL (ref 6–12)
POC CREATININE URINE: 200
POC MICROALBUMIN URINE: 30
POTASSIUM SERPL-SCNC: 4.2 MMOL/L (ref 3.5–5.2)
PROT SERPL-MCNC: 7.1 G/DL (ref 6–8.5)
PROT UR STRIP-MCNC: NEGATIVE MG/DL
RBC # BLD AUTO: 4.42 10*6/MM3 (ref 3.77–5.28)
RBC # UR STRIP: NEGATIVE /UL
SODIUM SERPL-SCNC: 137 MMOL/L (ref 136–145)
SP GR UR: 1.01 (ref 1–1.03)
TRIGL SERPL-MCNC: 88 MG/DL (ref 0–150)
TSH SERPL DL<=0.05 MIU/L-ACNC: 2.06 UIU/ML (ref 0.27–4.2)
UROBILINOGEN UR QL: NORMAL
VLDLC SERPL-MCNC: 16 MG/DL (ref 5–40)
WBC # BLD AUTO: 6.32 10*3/MM3 (ref 3.4–10.8)

## 2021-04-06 PROCEDURE — 80061 LIPID PANEL: CPT | Performed by: INTERNAL MEDICINE

## 2021-04-06 PROCEDURE — 83036 HEMOGLOBIN GLYCOSYLATED A1C: CPT | Performed by: INTERNAL MEDICINE

## 2021-04-06 PROCEDURE — 36415 COLL VENOUS BLD VENIPUNCTURE: CPT | Performed by: INTERNAL MEDICINE

## 2021-04-06 PROCEDURE — 82044 UR ALBUMIN SEMIQUANTITATIVE: CPT | Performed by: INTERNAL MEDICINE

## 2021-04-06 PROCEDURE — 84443 ASSAY THYROID STIM HORMONE: CPT | Performed by: INTERNAL MEDICINE

## 2021-04-06 PROCEDURE — 81003 URINALYSIS AUTO W/O SCOPE: CPT | Performed by: INTERNAL MEDICINE

## 2021-04-06 PROCEDURE — 82306 VITAMIN D 25 HYDROXY: CPT | Performed by: INTERNAL MEDICINE

## 2021-04-06 PROCEDURE — 85025 COMPLETE CBC W/AUTO DIFF WBC: CPT | Performed by: INTERNAL MEDICINE

## 2021-04-06 PROCEDURE — 80053 COMPREHEN METABOLIC PANEL: CPT | Performed by: INTERNAL MEDICINE

## 2021-04-06 PROCEDURE — 99396 PREV VISIT EST AGE 40-64: CPT | Performed by: INTERNAL MEDICINE

## 2021-04-06 RX ORDER — ESTRADIOL 0.5 MG/1
1 TABLET ORAL DAILY
COMMUNITY
Start: 2021-03-14

## 2021-04-06 NOTE — PROGRESS NOTES
Chief Complaint   Patient presents with   • Annual Exam       History of Present Illness    The patient presents for an established patient physical examination and health maintenance visit.    The patient presents for a follow-up related to hypertension. The patient reports that she has had no headaches, chest pain, dyspnea, edema, syncope, blurred vision or palpitations. She states that she is taking her medication as prescribed. She is not having medication side effects.    The patient presents for a follow-up related to impaired glucose tolerance. She does not check her blood sugars at home. She denies hypoglycemic symptoms. The patient denies polyuria, polydypsia or polyphagia. She reports no symptoms of neuropathy. The patient is not on medication for her impaired glucose tolerance. The patient does check her feet daily at home. She denies orthopnea, paroxysmal nocturnal dyspnea or dyspnea on exertion.    The patient presents for a follow-up related to GERD. The patient is on pantoprazole for her gastroesophageal reflux. The medication is taken on a regular basis and gives complete relief of the symptoms. She reports no abdominal pain, belching, diarrhea, dysphagia, early satiety, heartburn, hoarseness, nausea, odynophagia, rectal bleeding, vomiting or weight loss. The GERD has no known aggravating factors.    Review of Systems    CONSTITUTIONAL- Denies Fever, Chills, Sweats, Fatigue, Weakness or Malaise.    NECK- Denies Decreased Range of Motion, Stiffness, Thyroid Nodules, Enlarged Lymph Nodes or Goiter.    EYES- Denies Eye Pain, Eye Drainage, Eye Redness, Eye Discharge, Decreased Vision, Visual Disturbance, Diplopia, Visual Loss or Swollen Eyelid.    HENT- Denies Nasal Discharge, Sore Throat, Ear Pain, Ear Drainage, Sinus Pain, Nasal Congestion, Decreased Hearing or Tinnitus.    PULMONARY- Denies Wheezing, Sputum Production, Cough, Hemoptysis or Pleuritic Chest Pain.    GASTROINTESTINAL- Denies  "Constipation.    CARDIOVASCULAR- Denies Claudication or Irregular Heart Beat.    GENITOURINARY- Denies Dysuria, Hematuria, Urinary Frequency, Urinary Leakage, Pelvic Pain, Vaginal Prolapse, Vaginal Discharge, Dyspareunia or Abnormal Menses.    ENDOCRINE- Denies Cold Intolerance, Depression, Hair Loss, Heat Intolerance, Memory Loss, Sleep Disturbance or Weight Gain.    NEUROLOGIC- Denies Seizures, Confusion or Excessive Daytime Sleepiness.    MUSCULOSKELETAL- Denies Joint Pain, Joint Stiffness, Decreased Range of Motion, Joint Swelling or Erythema of Joints.    INTEGUMENTARY- Denies Rash, Lumps, Sores, Itching, Dryness, Color Change, Changes in Hair, Brittle Nails, Discolored Nails, Thickened Nails, Growths or Alopecia.    Medications      Current Outpatient Medications:   •  cetirizine (zyrTEC) 10 MG tablet, Take 10 mg by mouth Daily., Disp: , Rfl:   •  estradiol (ESTRACE) 0.5 MG tablet, Take 1 tablet by mouth Daily., Disp: , Rfl:   •  losartan-hydrochlorothiazide (HYZAAR) 100-25 MG per tablet, Take 1 tablet by mouth Daily., Disp: 90 tablet, Rfl: 1  •  pantoprazole (PROTONIX) 40 MG EC tablet, Take 1 tablet by mouth Daily., Disp: 90 tablet, Rfl: 1  •  Triamcinolone Acetonide (NASACORT) 55 MCG/ACT nasal inhaler, 2 sprays into the nostril(s) as directed by provider Daily As Needed., Disp: , Rfl:      Allergies    No Known Allergies    Problem List    Patient Active Problem List   Diagnosis   • Benign essential hypertension   • Hearing loss   • Benign paroxysmal positional vertigo       Medications, Allergies, Problems List and Past History were reviewed and updated.    Physical Examination    /74 (BP Location: Left arm, Patient Position: Sitting, Cuff Size: Adult)   Pulse 72   Temp 97.7 °F (36.5 °C) (Infrared)   Resp 18   Ht 149.9 cm (59\")   Wt 64.9 kg (143 lb)   LMP  (LMP Unknown) Comment: LAST PAP 5/2020 BY DR DUARTE  Breastfeeding No   BMI 28.88 kg/m²     HEENT: Head- Normocephalic Atraumatic. Facies- " Within normal limits. Pinnas- Normal texture and shape bilaterally. Canals- Normal bilaterally. TMs- Normal bilaterally. Nares- Patent bilaterally. Nasal Septum- is normal. There is no tenderness to palpation over the frontal or maxillary sinuses. Lids- Normal bilaterally. Conjunctiva- Clear bilaterally. Sclera- Anicteric bilaterally. Oropharynx- Moist with no lesions. Tonsils- No enlargement, erythema or exudate.    Neck: Thyroid- non enlarged, symmetric and has no nodules. No bruits are detected. ROM- Normal Range of Motion with no rigidity.    Lungs: Auscultation- Clear to auscultation bilaterally. There are no retractions, clubbing or cyanosis. The Expiratory to Inspiratory ratio is equal.    Lymph Nodes: Cervical- no enlarged lymph nodes noted. Clavicular- no enlarged supraclavicular lymph nodes noted. Axillary- no enlarged axillary lymph nodes noted. Inguinal- no enlarged inguinal lymph nodes noted.    Cardiovascular: There are no carotid bruits. Heart- Normal Rate with Regular rhythm and no murmurs. There are no gallops. There are no rubs. In the lower extremities there is no edema. The upper extremities do not have edema.    Abdomen: Soft, benign, non-tender with no masses, hernias, organomegaly or scars.    Breast- Deferred. She is schedule with her gynecologist.    Dermatologic: The patient has no worrisome or suspicious skin lesions noted.    Impression and Assessment    Normal Physical Examination.    Essential Hypertension.    Impaired Glucose Tolerance.    Gastroesophageal Reflux Disease.    Plan    Gastroesophageal Reflux Disease Plan: The patient was instructed to continue the current medications.    Essential Hypertension Plan: The patient was instructed to continue the current medications.    Impaired Glucose Tolerance Plan: Further plans will be formulated after test results are reviewed.    Counseling was provided regarding: Adequate Aerobic Exercise, Dental Visits, Flossing Teeth and Heart  Healthy Diet.    The following was ordered for screening and health maintenance: CBC w Automated Diff, CMP, Lipid Profile, TSH and U/A.       Immunizations Ordered and Administered: None.    Diagnoses and all orders for this visit:    1. Physical exam (Primary)  -     Comprehensive Metabolic Panel  -     Lipid Panel  -     CBC & Differential  -     TSH  -     Vitamin D 25 Hydroxy  -     POC Urinalysis Dipstick, Automated    2. Essential hypertension    3. Impaired glucose tolerance  -     Hemoglobin A1c  -     POC Microalbumin    4. Gastroesophageal reflux disease without esophagitis        Return to Office    The patient was instructed to return for follow-up in 6 months. The patient was instructed to return sooner if the condition changes, worsens, or does not resolve.

## 2021-06-01 ENCOUNTER — OFFICE VISIT (OUTPATIENT)
Dept: INTERNAL MEDICINE | Facility: CLINIC | Age: 66
End: 2021-06-01

## 2021-06-01 VITALS
DIASTOLIC BLOOD PRESSURE: 82 MMHG | HEART RATE: 88 BPM | TEMPERATURE: 97.7 F | RESPIRATION RATE: 18 BRPM | WEIGHT: 135 LBS | BODY MASS INDEX: 27.27 KG/M2 | SYSTOLIC BLOOD PRESSURE: 164 MMHG

## 2021-06-01 DIAGNOSIS — J01.00 ACUTE NON-RECURRENT MAXILLARY SINUSITIS: Primary | ICD-10-CM

## 2021-06-01 PROCEDURE — 99214 OFFICE O/P EST MOD 30 MIN: CPT | Performed by: INTERNAL MEDICINE

## 2021-06-01 RX ORDER — METHYLPREDNISOLONE 4 MG/1
TABLET ORAL
Qty: 21 TABLET | Refills: 0 | Status: SHIPPED | OUTPATIENT
Start: 2021-06-01 | End: 2021-11-09

## 2021-06-01 RX ORDER — MEDROXYPROGESTERONE ACETATE 5 MG/1
1 TABLET ORAL DAILY
COMMUNITY
Start: 2021-05-28 | End: 2021-11-09

## 2021-06-01 RX ORDER — AMOXICILLIN AND CLAVULANATE POTASSIUM 875; 125 MG/1; MG/1
1 TABLET, FILM COATED ORAL 2 TIMES DAILY
Qty: 20 TABLET | Refills: 0 | Status: SHIPPED | OUTPATIENT
Start: 2021-06-01 | End: 2021-09-19 | Stop reason: SDUPTHER

## 2021-06-01 NOTE — PROGRESS NOTES
Chief Complaint   Patient presents with   • Sinusitis       History of Present Illness    The patient reports a 1 week history of upper respiratory symptoms. The patient has symptoms of facial pain, tooth pain, a headache, nasal congestion and rhinorrhea but the patient does not have a dry cough, a wet cough, wheezing, fever, eye drainage, ear pain, ear drainage, nausea, vomiting, diarrhea, rash, decreased appetite, abdominal pain or sore throat. The nasal discharge is yellow, green, thick and purulent. The patient has not tried anything for treatment of this illness.    Review of Systems    CONSTITUTIONAL- Denies Unexplained Weight Loss, Chills, Sweats, Fatigue, Weakness or Malaise.    HENT- Reports: Sinus Pain. Denies: Decreased Hearing or Tinnitus.    GASTROINTESTINAL- Denies Blood per Rectum, Constipation or Heartburn.    PULMONARY- Denies Dyspnea, Sputum Production, Cough, Hemoptysis or Pleuritic Chest Pain.    Medications      Current Outpatient Medications:   •  cetirizine (zyrTEC) 10 MG tablet, Take 10 mg by mouth Daily., Disp: , Rfl:   •  estradiol (ESTRACE) 0.5 MG tablet, Take 1 tablet by mouth Daily., Disp: , Rfl:   •  losartan-hydrochlorothiazide (HYZAAR) 100-25 MG per tablet, Take 1 tablet by mouth Daily., Disp: 90 tablet, Rfl: 1  •  medroxyPROGESTERone (PROVERA) 5 MG tablet, Take 1 tablet by mouth Daily., Disp: , Rfl:   •  pantoprazole (PROTONIX) 40 MG EC tablet, Take 1 tablet by mouth Daily., Disp: 90 tablet, Rfl: 1  •  Triamcinolone Acetonide (NASACORT) 55 MCG/ACT nasal inhaler, 2 sprays into the nostril(s) as directed by provider Daily As Needed., Disp: , Rfl:      Allergies    No Known Allergies    Problem List    Patient Active Problem List   Diagnosis   • Benign essential hypertension   • Hearing loss   • Benign paroxysmal positional vertigo       Medications, Allergies, Problems List and Past History were reviewed and updated.    Physical Examination    /82 (BP Location: Left arm, Patient  Position: Sitting, Cuff Size: Adult)   Pulse 88   Temp 97.7 °F (36.5 °C) (Infrared)   Resp 18   Wt 61.2 kg (135 lb)   LMP  (LMP Unknown) Comment: LAST PAP 5/2020 BY DR DUARTE  Breastfeeding No   BMI 27.27 kg/m²     HEENT: Head- Normocephalic Atraumatic. Facies- Within normal limits. Pinnas- Normal texture and shape bilaterally. Canals- Normal bilaterally. TMs- Normal bilaterally. Nares- Both nares are abnormal. The right Nare is inflammed, has a yellow discharge and has a purulent discharge. The left Nare is inflammed, has a yellow discharge and has a purulent discharge. Nasal Septum- is normal. There is pain to palpation over the right maxillary sinus and left maxillary sinus but not over the right frontal sinus or left frontal sinus. Lids- Normal bilaterally. Conjunctiva- Clear bilaterally. Sclera- Anicteric bilaterally. Oropharynx- Moist with no lesions. Tonsils- No enlargement, erythema or exudate.    Neck: Thyroid- non enlarged, symmetric and has no nodules. No bruits are detected. ROM- Normal Range of Motion with no rigidity.    Lungs: Auscultation- Clear to auscultation bilaterally. There are no retractions, clubbing or cyanosis. The Expiratory to Inspiratory ratio is equal.    Lymph Nodes: Cervical- no enlarged lymph nodes noted.    Cardiovascular: Heart- Normal Rate with Regular rhythm and no murmurs.    Impression and Assessment    Acute Sinusitis.    Plan    Acute Sinusitis Plan: Use over the counter acetaminophen for fevers or comfort. A cool mist humidifier was encouraged. A decongestant nasal spray was encouraged, with specific instructions given to not use the spray for longer than 5 days. Over the counter mucolytic agents were encouraged. Medication will be added as noted below.    Diagnoses and all orders for this visit:    1. Acute non-recurrent maxillary sinusitis (Primary)  -     amoxicillin-clavulanate (Augmentin) 875-125 MG per tablet; Take 1 tablet by mouth 2 (Two) Times a Day.   Dispense: 20 tablet; Refill: 0  -     methylPREDNISolone (MEDROL) 4 MG dose pack; Take as directed on package instructions.  Dispense: 21 tablet; Refill: 0        Return to Office    The patient was instructed to return for follow-up as needed. The patient was instructed to return sooner if the condition changes, worsens, or does not resolve.

## 2021-08-12 DIAGNOSIS — I10 BENIGN ESSENTIAL HYPERTENSION: ICD-10-CM

## 2021-08-12 RX ORDER — PANTOPRAZOLE SODIUM 40 MG/1
40 TABLET, DELAYED RELEASE ORAL DAILY
Qty: 90 TABLET | Refills: 1 | Status: SHIPPED | OUTPATIENT
Start: 2021-08-12 | End: 2022-03-08

## 2021-08-12 RX ORDER — LOSARTAN POTASSIUM AND HYDROCHLOROTHIAZIDE 25; 100 MG/1; MG/1
1 TABLET ORAL DAILY
Qty: 90 TABLET | Refills: 1 | Status: SHIPPED | OUTPATIENT
Start: 2021-08-12 | End: 2022-03-08

## 2021-09-19 DIAGNOSIS — J01.00 ACUTE NON-RECURRENT MAXILLARY SINUSITIS: ICD-10-CM

## 2021-09-19 RX ORDER — AMOXICILLIN AND CLAVULANATE POTASSIUM 875; 125 MG/1; MG/1
1 TABLET, FILM COATED ORAL 2 TIMES DAILY
Qty: 20 TABLET | Refills: 0 | Status: SHIPPED | OUTPATIENT
Start: 2021-09-19 | End: 2021-11-09

## 2021-09-19 RX ORDER — FLUCONAZOLE 150 MG/1
150 TABLET ORAL DAILY
Qty: 3 TABLET | Refills: 0 | Status: SHIPPED | OUTPATIENT
Start: 2021-09-19 | End: 2021-09-22

## 2021-11-09 ENCOUNTER — OFFICE VISIT (OUTPATIENT)
Dept: INTERNAL MEDICINE | Facility: CLINIC | Age: 66
End: 2021-11-09

## 2021-11-09 VITALS
BODY MASS INDEX: 24.84 KG/M2 | WEIGHT: 123 LBS | TEMPERATURE: 98.1 F | HEART RATE: 68 BPM | SYSTOLIC BLOOD PRESSURE: 132 MMHG | RESPIRATION RATE: 16 BRPM | DIASTOLIC BLOOD PRESSURE: 62 MMHG

## 2021-11-09 DIAGNOSIS — K21.9 GASTROESOPHAGEAL REFLUX DISEASE WITHOUT ESOPHAGITIS: ICD-10-CM

## 2021-11-09 DIAGNOSIS — I10 ESSENTIAL HYPERTENSION: Primary | ICD-10-CM

## 2021-11-09 DIAGNOSIS — R73.02 IMPAIRED GLUCOSE TOLERANCE: ICD-10-CM

## 2021-11-09 LAB — HBA1C MFR BLD: 6.36 % (ref 4.8–5.6)

## 2021-11-09 PROCEDURE — 80061 LIPID PANEL: CPT | Performed by: INTERNAL MEDICINE

## 2021-11-09 PROCEDURE — 80053 COMPREHEN METABOLIC PANEL: CPT | Performed by: INTERNAL MEDICINE

## 2021-11-09 PROCEDURE — 99214 OFFICE O/P EST MOD 30 MIN: CPT | Performed by: INTERNAL MEDICINE

## 2021-11-09 PROCEDURE — 83036 HEMOGLOBIN GLYCOSYLATED A1C: CPT | Performed by: INTERNAL MEDICINE

## 2021-11-09 NOTE — PROGRESS NOTES
Chief Complaint   Patient presents with   • Follow-up     6 MONTH FOLLOW UP CHRONIC MEDICAL PROBLEMS       History of Present Illness    The patient presents for a follow-up related to hypertension. The patient reports that she has had no headaches, chest pain, dyspnea, edema, syncope, blurred vision or palpitations. She states that she is taking her medication as prescribed. She is not having medication side effects.    The patient presents for a follow-up related to impaired glucose tolerance. She does not check her blood sugars at home. She denies hypoglycemic symptoms. The patient denies polyuria, polydypsia or polyphagia. She reports no symptoms of neuropathy. The patient is not on medication for her impaired glucose tolerance. The patient does check her feet daily at home. She denies orthopnea, paroxysmal nocturnal dyspnea or dyspnea on exertion.    The patient presents for a follow-up related to GERD. The patient is on pantoprazole for her gastroesophageal reflux. The medication is taken on a regular basis and gives complete relief of the symptoms. She reports no abdominal pain, belching, diarrhea, dysphagia, early satiety, heartburn, hoarseness, nausea, odynophagia, rectal bleeding, vomiting or weight loss. The GERD has no known aggravating factors.    Review of Systems    CONSTITUTIONAL- Denies Fever, Chills, Sweats, Fatigue, Weakness or Malaise.    HENT- Denies Nasal Discharge, Sore Throat, Ear Pain, Ear Drainage, Sinus Pain, Nasal Congestion, Decreased Hearing or Tinnitus.    GASTROINTESTINAL- Denies Constipation.    PULMONARY- Denies Wheezing, Sputum Production, Cough, Hemoptysis or Pleuritic Chest Pain.    CARDIOVASCULAR- Denies Claudication or Irregular Heart Beat.    Medications      Current Outpatient Medications:   •  cetirizine (zyrTEC) 10 MG tablet, Take 10 mg by mouth Daily., Disp: , Rfl:   •  estradiol (ESTRACE) 0.5 MG tablet, Take 1 tablet by mouth Daily., Disp: , Rfl:   •   losartan-hydrochlorothiazide (HYZAAR) 100-25 MG per tablet, Take 1 tablet by mouth Daily., Disp: 90 tablet, Rfl: 1  •  pantoprazole (PROTONIX) 40 MG EC tablet, Take 1 tablet by mouth Daily., Disp: 90 tablet, Rfl: 1  •  Triamcinolone Acetonide (NASACORT) 55 MCG/ACT nasal inhaler, 2 sprays into the nostril(s) as directed by provider Daily As Needed., Disp: , Rfl:      Allergies    No Known Allergies    Problem List    Patient Active Problem List   Diagnosis   • Benign essential hypertension   • Hearing loss   • Benign paroxysmal positional vertigo       Medications, Allergies, Problems List and Past History were reviewed and updated.    Physical Examination    /62   Pulse 68   Temp 98.1 °F (36.7 °C) (Infrared)   Resp 16   Wt 55.8 kg (123 lb)   LMP  (LMP Unknown) Comment: LAST PAP 5/2020 BY DR DUARTE  BMI 24.84 kg/m²     HEENT: Facies- Within normal limits. Lids- Normal bilaterally. Conjunctiva- Clear bilaterally. Sclera- Anicteric bilaterally.    Neck: Thyroid- non enlarged, symmetric and has no nodules. No bruits are detected.    Lungs: Auscultation- Clear to auscultation bilaterally. There are no retractions, clubbing or cyanosis. The Expiratory to Inspiratory ratio is equal.    Cardiovascular: There are no carotid bruits. Heart- Normal Rate with Regular rhythm and no murmurs. There are no gallops. There are no rubs. In the lower extremities there is no edema. The upper extremities do not have edema.    Abdomen: Soft, benign, non-tender with no masses, hernias, organomegaly or scars.    Impression and Assessment    Essential Hypertension.    Impaired Glucose Tolerance.    Gastroesophageal Reflux Disease.    Plan    Gastroesophageal Reflux Disease Plan: The patient was instructed to continue the current medications.    Essential Hypertension Plan: The patient was instructed to continue the current medications.    Impaired Glucose Tolerance Plan: Further plans will be formulated after test results are  reviewed.    Diagnoses and all orders for this visit:    1. Essential hypertension (Primary)  -     Comprehensive Metabolic Panel; Future  -     Lipid Panel; Future  -     Lipid Panel  -     Comprehensive Metabolic Panel    2. Impaired glucose tolerance  -     Comprehensive Metabolic Panel; Future  -     Hemoglobin A1c; Future  -     Lipid Panel; Future  -     Lipid Panel  -     Hemoglobin A1c  -     Comprehensive Metabolic Panel    3. Gastroesophageal reflux disease without esophagitis        Return to Office    The patient was instructed to return for follow-up in 6 months. Next Visit: Follow-up, Subsequent Medicare Annual Wellness Visit and Physical Examination. The patient was instructed to return sooner if the condition changes, worsens, or does not resolve.

## 2021-11-10 LAB
ALBUMIN SERPL-MCNC: 4.3 G/DL (ref 3.5–5.2)
ALBUMIN/GLOB SERPL: 1.5 G/DL
ALP SERPL-CCNC: 81 U/L (ref 39–117)
ALT SERPL W P-5'-P-CCNC: 11 U/L (ref 1–33)
ANION GAP SERPL CALCULATED.3IONS-SCNC: 8.2 MMOL/L (ref 5–15)
AST SERPL-CCNC: 17 U/L (ref 1–32)
BILIRUB SERPL-MCNC: 0.3 MG/DL (ref 0–1.2)
BUN SERPL-MCNC: 17 MG/DL (ref 8–23)
BUN/CREAT SERPL: 27.4 (ref 7–25)
CALCIUM SPEC-SCNC: 9.6 MG/DL (ref 8.6–10.5)
CHLORIDE SERPL-SCNC: 98 MMOL/L (ref 98–107)
CHOLEST SERPL-MCNC: 163 MG/DL (ref 0–200)
CO2 SERPL-SCNC: 28.8 MMOL/L (ref 22–29)
CREAT SERPL-MCNC: 0.62 MG/DL (ref 0.57–1)
GFR SERPL CREATININE-BSD FRML MDRD: 97 ML/MIN/1.73
GLOBULIN UR ELPH-MCNC: 2.9 GM/DL
GLUCOSE SERPL-MCNC: 129 MG/DL (ref 65–99)
HDLC SERPL-MCNC: 60 MG/DL (ref 40–60)
LDLC SERPL CALC-MCNC: 82 MG/DL (ref 0–100)
LDLC/HDLC SERPL: 1.33 {RATIO}
POTASSIUM SERPL-SCNC: 3.5 MMOL/L (ref 3.5–5.2)
PROT SERPL-MCNC: 7.2 G/DL (ref 6–8.5)
SODIUM SERPL-SCNC: 135 MMOL/L (ref 136–145)
TRIGL SERPL-MCNC: 116 MG/DL (ref 0–150)
VLDLC SERPL-MCNC: 21 MG/DL (ref 5–40)

## 2022-03-08 DIAGNOSIS — I10 BENIGN ESSENTIAL HYPERTENSION: ICD-10-CM

## 2022-03-08 RX ORDER — PANTOPRAZOLE SODIUM 40 MG/1
TABLET, DELAYED RELEASE ORAL
Qty: 90 TABLET | Refills: 1 | Status: SHIPPED | OUTPATIENT
Start: 2022-03-08 | End: 2022-09-16

## 2022-03-08 RX ORDER — LOSARTAN POTASSIUM AND HYDROCHLOROTHIAZIDE 25; 100 MG/1; MG/1
TABLET ORAL
Qty: 90 TABLET | Refills: 1 | Status: SHIPPED | OUTPATIENT
Start: 2022-03-08 | End: 2022-09-16

## 2022-03-11 ENCOUNTER — APPOINTMENT (OUTPATIENT)
Dept: MRI IMAGING | Facility: HOSPITAL | Age: 67
End: 2022-03-11

## 2022-03-11 ENCOUNTER — HOSPITAL ENCOUNTER (OUTPATIENT)
Facility: HOSPITAL | Age: 67
LOS: 1 days | Discharge: HOME | End: 2022-03-14
Attending: EMERGENCY MEDICINE | Admitting: SURGERY

## 2022-03-11 ENCOUNTER — APPOINTMENT (OUTPATIENT)
Dept: ULTRASOUND IMAGING | Facility: HOSPITAL | Age: 67
End: 2022-03-11

## 2022-03-11 DIAGNOSIS — K81.9 CHOLECYSTITIS: ICD-10-CM

## 2022-03-11 DIAGNOSIS — K80.00 CALCULUS OF GALLBLADDER WITH ACUTE CHOLECYSTITIS WITHOUT OBSTRUCTION: ICD-10-CM

## 2022-03-11 DIAGNOSIS — E87.6 HYPOKALEMIA: ICD-10-CM

## 2022-03-11 DIAGNOSIS — K81.0 ACUTE CHOLECYSTITIS: Primary | ICD-10-CM

## 2022-03-11 PROBLEM — K21.9 GERD (GASTROESOPHAGEAL REFLUX DISEASE): Status: ACTIVE | Noted: 2022-03-11

## 2022-03-11 LAB
ALBUMIN SERPL-MCNC: 4.2 G/DL (ref 3.5–5.2)
ALBUMIN SERPL-MCNC: 4.4 G/DL (ref 3.5–5.2)
ALBUMIN/GLOB SERPL: 1.4 G/DL
ALBUMIN/GLOB SERPL: 1.4 G/DL
ALP SERPL-CCNC: 164 U/L (ref 39–117)
ALP SERPL-CCNC: 218 U/L (ref 39–117)
ALT SERPL W P-5'-P-CCNC: 133 U/L (ref 1–33)
ALT SERPL W P-5'-P-CCNC: 35 U/L (ref 1–33)
ANION GAP SERPL CALCULATED.3IONS-SCNC: 12 MMOL/L (ref 5–15)
ANION GAP SERPL CALCULATED.3IONS-SCNC: 14 MMOL/L (ref 5–15)
AST SERPL-CCNC: 340 U/L (ref 1–32)
AST SERPL-CCNC: 68 U/L (ref 1–32)
BASOPHILS # BLD AUTO: 0.02 10*3/MM3 (ref 0–0.2)
BASOPHILS NFR BLD AUTO: 0.1 % (ref 0–1.5)
BILIRUB SERPL-MCNC: 1.4 MG/DL (ref 0–1.2)
BILIRUB SERPL-MCNC: 3.3 MG/DL (ref 0–1.2)
BILIRUB UR QL STRIP: NEGATIVE
BUN SERPL-MCNC: 14 MG/DL (ref 8–23)
BUN SERPL-MCNC: 16 MG/DL (ref 8–23)
BUN/CREAT SERPL: 21.9 (ref 7–25)
BUN/CREAT SERPL: 23.5 (ref 7–25)
CALCIUM SPEC-SCNC: 9.7 MG/DL (ref 8.6–10.5)
CALCIUM SPEC-SCNC: 9.8 MG/DL (ref 8.6–10.5)
CHLORIDE SERPL-SCNC: 91 MMOL/L (ref 98–107)
CHLORIDE SERPL-SCNC: 93 MMOL/L (ref 98–107)
CLARITY UR: CLEAR
CO2 SERPL-SCNC: 27 MMOL/L (ref 22–29)
CO2 SERPL-SCNC: 28 MMOL/L (ref 22–29)
COLOR UR: YELLOW
CREAT SERPL-MCNC: 0.64 MG/DL (ref 0.57–1)
CREAT SERPL-MCNC: 0.68 MG/DL (ref 0.57–1)
D-LACTATE SERPL-SCNC: 1.2 MMOL/L (ref 0.5–2)
DEPRECATED RDW RBC AUTO: 41.8 FL (ref 37–54)
DEPRECATED RDW RBC AUTO: 42.2 FL (ref 37–54)
EGFRCR SERPLBLD CKD-EPI 2021: 96.8 ML/MIN/1.73
EGFRCR SERPLBLD CKD-EPI 2021: 98.2 ML/MIN/1.73
EOSINOPHIL # BLD AUTO: 0.04 10*3/MM3 (ref 0–0.4)
EOSINOPHIL NFR BLD AUTO: 0.2 % (ref 0.3–6.2)
ERYTHROCYTE [DISTWIDTH] IN BLOOD BY AUTOMATED COUNT: 12.6 % (ref 12.3–15.4)
ERYTHROCYTE [DISTWIDTH] IN BLOOD BY AUTOMATED COUNT: 12.7 % (ref 12.3–15.4)
GLOBULIN UR ELPH-MCNC: 3.1 GM/DL
GLOBULIN UR ELPH-MCNC: 3.2 GM/DL
GLUCOSE SERPL-MCNC: 149 MG/DL (ref 65–99)
GLUCOSE SERPL-MCNC: 171 MG/DL (ref 65–99)
GLUCOSE UR STRIP-MCNC: NEGATIVE MG/DL
HCT VFR BLD AUTO: 40.4 % (ref 34–46.6)
HCT VFR BLD AUTO: 41.9 % (ref 34–46.6)
HGB BLD-MCNC: 14 G/DL (ref 12–15.9)
HGB BLD-MCNC: 14.3 G/DL (ref 12–15.9)
HGB UR QL STRIP.AUTO: NEGATIVE
IMM GRANULOCYTES # BLD AUTO: 0.09 10*3/MM3 (ref 0–0.05)
IMM GRANULOCYTES NFR BLD AUTO: 0.6 % (ref 0–0.5)
KETONES UR QL STRIP: ABNORMAL
LEUKOCYTE ESTERASE UR QL STRIP.AUTO: NEGATIVE
LIPASE SERPL-CCNC: 14 U/L (ref 13–60)
LYMPHOCYTES # BLD AUTO: 0.47 10*3/MM3 (ref 0.7–3.1)
LYMPHOCYTES NFR BLD AUTO: 2.9 % (ref 19.6–45.3)
MAGNESIUM SERPL-MCNC: 1.6 MG/DL (ref 1.6–2.4)
MAGNESIUM SERPL-MCNC: 1.7 MG/DL (ref 1.6–2.4)
MCH RBC QN AUTO: 31 PG (ref 26.6–33)
MCH RBC QN AUTO: 31.4 PG (ref 26.6–33)
MCHC RBC AUTO-ENTMCNC: 34.1 G/DL (ref 31.5–35.7)
MCHC RBC AUTO-ENTMCNC: 34.7 G/DL (ref 31.5–35.7)
MCV RBC AUTO: 90.6 FL (ref 79–97)
MCV RBC AUTO: 90.9 FL (ref 79–97)
MONOCYTES # BLD AUTO: 0.24 10*3/MM3 (ref 0.1–0.9)
MONOCYTES NFR BLD AUTO: 1.5 % (ref 5–12)
NEUTROPHILS NFR BLD AUTO: 15.35 10*3/MM3 (ref 1.7–7)
NEUTROPHILS NFR BLD AUTO: 94.7 % (ref 42.7–76)
NITRITE UR QL STRIP: NEGATIVE
NRBC BLD AUTO-RTO: 0 /100 WBC (ref 0–0.2)
PH UR STRIP.AUTO: 7 [PH] (ref 5–8)
PLATELET # BLD AUTO: 293 10*3/MM3 (ref 140–450)
PLATELET # BLD AUTO: 323 10*3/MM3 (ref 140–450)
PMV BLD AUTO: 8.4 FL (ref 6–12)
PMV BLD AUTO: 8.6 FL (ref 6–12)
POTASSIUM SERPL-SCNC: 2.5 MMOL/L (ref 3.5–5.2)
POTASSIUM SERPL-SCNC: 2.8 MMOL/L (ref 3.5–5.2)
PROT SERPL-MCNC: 7.3 G/DL (ref 6–8.5)
PROT SERPL-MCNC: 7.6 G/DL (ref 6–8.5)
PROT UR QL STRIP: NEGATIVE
RBC # BLD AUTO: 4.46 10*6/MM3 (ref 3.77–5.28)
RBC # BLD AUTO: 4.61 10*6/MM3 (ref 3.77–5.28)
SARS-COV-2 RDRP RESP QL NAA+PROBE: NORMAL
SODIUM SERPL-SCNC: 132 MMOL/L (ref 136–145)
SODIUM SERPL-SCNC: 133 MMOL/L (ref 136–145)
SP GR UR STRIP: 1.02 (ref 1–1.03)
UROBILINOGEN UR QL STRIP: ABNORMAL
WBC NRBC COR # BLD: 14.98 10*3/MM3 (ref 3.4–10.8)
WBC NRBC COR # BLD: 16.21 10*3/MM3 (ref 3.4–10.8)

## 2022-03-11 PROCEDURE — 99284 EMERGENCY DEPT VISIT MOD MDM: CPT

## 2022-03-11 PROCEDURE — 80053 COMPREHEN METABOLIC PANEL: CPT | Performed by: INTERNAL MEDICINE

## 2022-03-11 PROCEDURE — 99220 PR INITIAL OBSERVATION CARE/DAY 70 MINUTES: CPT | Performed by: INTERNAL MEDICINE

## 2022-03-11 PROCEDURE — 76705 ECHO EXAM OF ABDOMEN: CPT

## 2022-03-11 PROCEDURE — 83605 ASSAY OF LACTIC ACID: CPT | Performed by: INTERNAL MEDICINE

## 2022-03-11 PROCEDURE — 85027 COMPLETE CBC AUTOMATED: CPT | Performed by: INTERNAL MEDICINE

## 2022-03-11 PROCEDURE — 0 POTASSIUM CHLORIDE 10 MEQ/100ML SOLUTION: Performed by: INTERNAL MEDICINE

## 2022-03-11 PROCEDURE — C9803 HOPD COVID-19 SPEC COLLECT: HCPCS

## 2022-03-11 PROCEDURE — 74181 MRI ABDOMEN W/O CONTRAST: CPT

## 2022-03-11 PROCEDURE — 36415 COLL VENOUS BLD VENIPUNCTURE: CPT

## 2022-03-11 PROCEDURE — 83735 ASSAY OF MAGNESIUM: CPT | Performed by: INTERNAL MEDICINE

## 2022-03-11 PROCEDURE — 87040 BLOOD CULTURE FOR BACTERIA: CPT | Performed by: INTERNAL MEDICINE

## 2022-03-11 PROCEDURE — 63710000001 ONDANSETRON PER 8 MG: Performed by: STUDENT IN AN ORGANIZED HEALTH CARE EDUCATION/TRAINING PROGRAM

## 2022-03-11 PROCEDURE — 80053 COMPREHEN METABOLIC PANEL: CPT | Performed by: EMERGENCY MEDICINE

## 2022-03-11 PROCEDURE — 25010000002 PIPERACILLIN SOD-TAZOBACTAM PER 1 G: Performed by: STUDENT IN AN ORGANIZED HEALTH CARE EDUCATION/TRAINING PROGRAM

## 2022-03-11 PROCEDURE — 96367 TX/PROPH/DG ADDL SEQ IV INF: CPT

## 2022-03-11 PROCEDURE — 81003 URINALYSIS AUTO W/O SCOPE: CPT | Performed by: EMERGENCY MEDICINE

## 2022-03-11 PROCEDURE — 87635 SARS-COV-2 COVID-19 AMP PRB: CPT | Performed by: SURGERY

## 2022-03-11 PROCEDURE — 83690 ASSAY OF LIPASE: CPT | Performed by: EMERGENCY MEDICINE

## 2022-03-11 PROCEDURE — 96365 THER/PROPH/DIAG IV INF INIT: CPT

## 2022-03-11 PROCEDURE — 85025 COMPLETE CBC W/AUTO DIFF WBC: CPT | Performed by: EMERGENCY MEDICINE

## 2022-03-11 RX ORDER — MAGNESIUM SULFATE HEPTAHYDRATE 40 MG/ML
4 INJECTION, SOLUTION INTRAVENOUS AS NEEDED
Status: DISCONTINUED | OUTPATIENT
Start: 2022-03-11 | End: 2022-03-14 | Stop reason: HOSPADM

## 2022-03-11 RX ORDER — POTASSIUM CHLORIDE 1.5 G/1.77G
40 POWDER, FOR SOLUTION ORAL AS NEEDED
Status: DISCONTINUED | OUTPATIENT
Start: 2022-03-11 | End: 2022-03-14 | Stop reason: HOSPADM

## 2022-03-11 RX ORDER — NALOXONE HCL 0.4 MG/ML
0.4 VIAL (ML) INJECTION
Status: DISCONTINUED | OUTPATIENT
Start: 2022-03-11 | End: 2022-03-14 | Stop reason: HOSPADM

## 2022-03-11 RX ORDER — SODIUM CHLORIDE 0.9 % (FLUSH) 0.9 %
10 SYRINGE (ML) INJECTION EVERY 12 HOURS SCHEDULED
Status: DISCONTINUED | OUTPATIENT
Start: 2022-03-11 | End: 2022-03-14 | Stop reason: HOSPADM

## 2022-03-11 RX ORDER — PANTOPRAZOLE SODIUM 40 MG/1
40 TABLET, DELAYED RELEASE ORAL DAILY
Status: DISCONTINUED | OUTPATIENT
Start: 2022-03-12 | End: 2022-03-14 | Stop reason: HOSPADM

## 2022-03-11 RX ORDER — POTASSIUM CHLORIDE 750 MG/1
40 CAPSULE, EXTENDED RELEASE ORAL AS NEEDED
Status: DISCONTINUED | OUTPATIENT
Start: 2022-03-11 | End: 2022-03-14 | Stop reason: HOSPADM

## 2022-03-11 RX ORDER — POTASSIUM CHLORIDE 7.45 MG/ML
10 INJECTION INTRAVENOUS
Status: DISCONTINUED | OUTPATIENT
Start: 2022-03-11 | End: 2022-03-14 | Stop reason: HOSPADM

## 2022-03-11 RX ORDER — MAGNESIUM SULFATE HEPTAHYDRATE 40 MG/ML
2 INJECTION, SOLUTION INTRAVENOUS AS NEEDED
Status: DISCONTINUED | OUTPATIENT
Start: 2022-03-11 | End: 2022-03-14 | Stop reason: HOSPADM

## 2022-03-11 RX ORDER — HYDROCODONE BITARTRATE AND ACETAMINOPHEN 5; 325 MG/1; MG/1
1 TABLET ORAL EVERY 4 HOURS PRN
Status: DISCONTINUED | OUTPATIENT
Start: 2022-03-11 | End: 2022-03-14 | Stop reason: HOSPADM

## 2022-03-11 RX ORDER — ONDANSETRON 2 MG/ML
4 INJECTION INTRAMUSCULAR; INTRAVENOUS EVERY 6 HOURS PRN
Status: DISCONTINUED | OUTPATIENT
Start: 2022-03-11 | End: 2022-03-14 | Stop reason: HOSPADM

## 2022-03-11 RX ORDER — SODIUM CHLORIDE 0.9 % (FLUSH) 0.9 %
10 SYRINGE (ML) INJECTION AS NEEDED
Status: DISCONTINUED | OUTPATIENT
Start: 2022-03-11 | End: 2022-03-14 | Stop reason: HOSPADM

## 2022-03-11 RX ORDER — SODIUM CHLORIDE 9 MG/ML
100 INJECTION, SOLUTION INTRAVENOUS CONTINUOUS
Status: DISCONTINUED | OUTPATIENT
Start: 2022-03-11 | End: 2022-03-14 | Stop reason: HOSPADM

## 2022-03-11 RX ORDER — LEVOCETIRIZINE DIHYDROCHLORIDE 5 MG/1
5 TABLET, FILM COATED ORAL EVERY EVENING
COMMUNITY
End: 2022-03-14 | Stop reason: HOSPADM

## 2022-03-11 RX ORDER — ONDANSETRON 4 MG/1
4 TABLET, FILM COATED ORAL EVERY 6 HOURS PRN
Status: DISCONTINUED | OUTPATIENT
Start: 2022-03-11 | End: 2022-03-11 | Stop reason: SDUPTHER

## 2022-03-11 RX ORDER — HYDROMORPHONE HYDROCHLORIDE 1 MG/ML
0.5 INJECTION, SOLUTION INTRAMUSCULAR; INTRAVENOUS; SUBCUTANEOUS
Status: DISCONTINUED | OUTPATIENT
Start: 2022-03-11 | End: 2022-03-14 | Stop reason: HOSPADM

## 2022-03-11 RX ADMIN — Medication 10 ML: at 23:02

## 2022-03-11 RX ADMIN — TAZOBACTAM SODIUM AND PIPERACILLIN SODIUM 4.5 G: 500; 4 INJECTION, SOLUTION INTRAVENOUS at 19:53

## 2022-03-11 RX ADMIN — SODIUM CHLORIDE 100 ML/HR: 9 INJECTION, SOLUTION INTRAVENOUS at 23:02

## 2022-03-11 RX ADMIN — POTASSIUM CHLORIDE 10 MEQ: 7.46 INJECTION, SOLUTION INTRAVENOUS at 23:02

## 2022-03-11 RX ADMIN — ONDANSETRON HYDROCHLORIDE 4 MG: 4 TABLET, FILM COATED ORAL at 20:31

## 2022-03-12 ENCOUNTER — APPOINTMENT (OUTPATIENT)
Dept: GENERAL RADIOLOGY | Facility: HOSPITAL | Age: 67
End: 2022-03-12

## 2022-03-12 ENCOUNTER — ANESTHESIA EVENT (OUTPATIENT)
Dept: GASTROENTEROLOGY | Facility: HOSPITAL | Age: 67
End: 2022-03-12

## 2022-03-12 ENCOUNTER — ANESTHESIA (OUTPATIENT)
Dept: GASTROENTEROLOGY | Facility: HOSPITAL | Age: 67
End: 2022-03-12

## 2022-03-12 PROBLEM — K81.0 ACUTE CHOLECYSTITIS: Status: ACTIVE | Noted: 2022-03-12

## 2022-03-12 PROBLEM — K80.00 CALCULUS OF GALLBLADDER WITH ACUTE CHOLECYSTITIS WITHOUT OBSTRUCTION: Status: ACTIVE | Noted: 2022-03-11

## 2022-03-12 LAB
ALBUMIN SERPL-MCNC: 3.5 G/DL (ref 3.5–5.2)
ALBUMIN/GLOB SERPL: 1.3 G/DL
ALP SERPL-CCNC: 166 U/L (ref 39–117)
ALT SERPL W P-5'-P-CCNC: 114 U/L (ref 1–33)
ANION GAP SERPL CALCULATED.3IONS-SCNC: 9 MMOL/L (ref 5–15)
AST SERPL-CCNC: 122 U/L (ref 1–32)
BASOPHILS # BLD AUTO: 0.04 10*3/MM3 (ref 0–0.2)
BASOPHILS NFR BLD AUTO: 0.3 % (ref 0–1.5)
BILIRUB SERPL-MCNC: 1 MG/DL (ref 0–1.2)
BUN SERPL-MCNC: 12 MG/DL (ref 8–23)
BUN/CREAT SERPL: 22.2 (ref 7–25)
CALCIUM SPEC-SCNC: 8.7 MG/DL (ref 8.6–10.5)
CHLORIDE SERPL-SCNC: 98 MMOL/L (ref 98–107)
CO2 SERPL-SCNC: 27 MMOL/L (ref 22–29)
CREAT SERPL-MCNC: 0.54 MG/DL (ref 0.57–1)
DEPRECATED RDW RBC AUTO: 44.5 FL (ref 37–54)
EGFRCR SERPLBLD CKD-EPI 2021: 102.3 ML/MIN/1.73
EOSINOPHIL # BLD AUTO: 0.05 10*3/MM3 (ref 0–0.4)
EOSINOPHIL NFR BLD AUTO: 0.3 % (ref 0.3–6.2)
ERYTHROCYTE [DISTWIDTH] IN BLOOD BY AUTOMATED COUNT: 12.8 % (ref 12.3–15.4)
GLOBULIN UR ELPH-MCNC: 2.6 GM/DL
GLUCOSE SERPL-MCNC: 99 MG/DL (ref 65–99)
HCT VFR BLD AUTO: 36.5 % (ref 34–46.6)
HGB BLD-MCNC: 12 G/DL (ref 12–15.9)
IMM GRANULOCYTES # BLD AUTO: 0.1 10*3/MM3 (ref 0–0.05)
IMM GRANULOCYTES NFR BLD AUTO: 0.6 % (ref 0–0.5)
LYMPHOCYTES # BLD AUTO: 0.69 10*3/MM3 (ref 0.7–3.1)
LYMPHOCYTES NFR BLD AUTO: 4.4 % (ref 19.6–45.3)
MAGNESIUM SERPL-MCNC: 1.7 MG/DL (ref 1.6–2.4)
MCH RBC QN AUTO: 31.2 PG (ref 26.6–33)
MCHC RBC AUTO-ENTMCNC: 32.9 G/DL (ref 31.5–35.7)
MCV RBC AUTO: 94.8 FL (ref 79–97)
MONOCYTES # BLD AUTO: 1.01 10*3/MM3 (ref 0.1–0.9)
MONOCYTES NFR BLD AUTO: 6.5 % (ref 5–12)
NEUTROPHILS NFR BLD AUTO: 13.68 10*3/MM3 (ref 1.7–7)
NEUTROPHILS NFR BLD AUTO: 87.9 % (ref 42.7–76)
NRBC BLD AUTO-RTO: 0 /100 WBC (ref 0–0.2)
PLATELET # BLD AUTO: 255 10*3/MM3 (ref 140–450)
PMV BLD AUTO: 8.6 FL (ref 6–12)
POTASSIUM SERPL-SCNC: 3.4 MMOL/L (ref 3.5–5.2)
POTASSIUM SERPL-SCNC: 3.7 MMOL/L (ref 3.5–5.2)
PROT SERPL-MCNC: 6.1 G/DL (ref 6–8.5)
QT INTERVAL: 382 MS
QTC INTERVAL: 426 MS
RBC # BLD AUTO: 3.85 10*6/MM3 (ref 3.77–5.28)
SODIUM SERPL-SCNC: 134 MMOL/L (ref 136–145)
WBC NRBC COR # BLD: 15.57 10*3/MM3 (ref 3.4–10.8)

## 2022-03-12 PROCEDURE — 96361 HYDRATE IV INFUSION ADD-ON: CPT

## 2022-03-12 PROCEDURE — 25010000002 PIPERACILLIN SOD-TAZOBACTAM PER 1 G: Performed by: INTERNAL MEDICINE

## 2022-03-12 PROCEDURE — 25010000002 PIPERACILLIN SOD-TAZOBACTAM PER 1 G: Performed by: SURGERY

## 2022-03-12 PROCEDURE — 43262 ENDO CHOLANGIOPANCREATOGRAPH: CPT | Performed by: INTERNAL MEDICINE

## 2022-03-12 PROCEDURE — 96366 THER/PROPH/DIAG IV INF ADDON: CPT

## 2022-03-12 PROCEDURE — 96367 TX/PROPH/DG ADDL SEQ IV INF: CPT

## 2022-03-12 PROCEDURE — 25010000002 ONDANSETRON PER 1 MG: Performed by: NURSE ANESTHETIST, CERTIFIED REGISTERED

## 2022-03-12 PROCEDURE — 43264 ERCP REMOVE DUCT CALCULI: CPT | Performed by: INTERNAL MEDICINE

## 2022-03-12 PROCEDURE — 25010000002 PROPOFOL 10 MG/ML EMULSION: Performed by: NURSE ANESTHETIST, CERTIFIED REGISTERED

## 2022-03-12 PROCEDURE — 25010000002 IOPAMIDOL 61 % SOLUTION: Performed by: INTERNAL MEDICINE

## 2022-03-12 PROCEDURE — 25010000002 NEOSTIGMINE 10 MG/10ML SOLUTION: Performed by: NURSE ANESTHETIST, CERTIFIED REGISTERED

## 2022-03-12 PROCEDURE — G0378 HOSPITAL OBSERVATION PER HR: HCPCS

## 2022-03-12 PROCEDURE — 99225 PR SBSQ OBSERVATION CARE/DAY 25 MINUTES: CPT | Performed by: INTERNAL MEDICINE

## 2022-03-12 PROCEDURE — 0 POTASSIUM CHLORIDE 10 MEQ/100ML SOLUTION: Performed by: INTERNAL MEDICINE

## 2022-03-12 PROCEDURE — 25010000002 DEXAMETHASONE PER 1 MG: Performed by: NURSE ANESTHETIST, CERTIFIED REGISTERED

## 2022-03-12 PROCEDURE — 85025 COMPLETE CBC W/AUTO DIFF WBC: CPT | Performed by: INTERNAL MEDICINE

## 2022-03-12 PROCEDURE — 99223 1ST HOSP IP/OBS HIGH 75: CPT | Performed by: NURSE PRACTITIONER

## 2022-03-12 PROCEDURE — 74330 X-RAY BILE/PANC ENDOSCOPY: CPT

## 2022-03-12 PROCEDURE — 80053 COMPREHEN METABOLIC PANEL: CPT | Performed by: INTERNAL MEDICINE

## 2022-03-12 PROCEDURE — 0 MAGNESIUM SULFATE 4 GM/100ML SOLUTION: Performed by: INTERNAL MEDICINE

## 2022-03-12 PROCEDURE — C1769 GUIDE WIRE: HCPCS | Performed by: INTERNAL MEDICINE

## 2022-03-12 PROCEDURE — 84132 ASSAY OF SERUM POTASSIUM: CPT | Performed by: NURSE PRACTITIONER

## 2022-03-12 PROCEDURE — 83735 ASSAY OF MAGNESIUM: CPT | Performed by: INTERNAL MEDICINE

## 2022-03-12 PROCEDURE — 93010 ELECTROCARDIOGRAM REPORT: CPT | Performed by: INTERNAL MEDICINE

## 2022-03-12 PROCEDURE — 93005 ELECTROCARDIOGRAM TRACING: CPT | Performed by: NURSE PRACTITIONER

## 2022-03-12 RX ORDER — MEPERIDINE HYDROCHLORIDE 50 MG/ML
12.5 INJECTION INTRAMUSCULAR; INTRAVENOUS; SUBCUTANEOUS
Status: DISCONTINUED | OUTPATIENT
Start: 2022-03-12 | End: 2022-03-12 | Stop reason: HOSPADM

## 2022-03-12 RX ORDER — ACETAMINOPHEN 650 MG/1
650 SUPPOSITORY RECTAL EVERY 4 HOURS PRN
Status: DISCONTINUED | OUTPATIENT
Start: 2022-03-12 | End: 2022-03-14 | Stop reason: HOSPADM

## 2022-03-12 RX ORDER — SODIUM CHLORIDE 0.9 % (FLUSH) 0.9 %
3-10 SYRINGE (ML) INJECTION AS NEEDED
Status: DISCONTINUED | OUTPATIENT
Start: 2022-03-12 | End: 2022-03-12 | Stop reason: HOSPADM

## 2022-03-12 RX ORDER — DEXAMETHASONE SODIUM PHOSPHATE 4 MG/ML
INJECTION, SOLUTION INTRA-ARTICULAR; INTRALESIONAL; INTRAMUSCULAR; INTRAVENOUS; SOFT TISSUE AS NEEDED
Status: DISCONTINUED | OUTPATIENT
Start: 2022-03-12 | End: 2022-03-12 | Stop reason: SURG

## 2022-03-12 RX ORDER — PROMETHAZINE HYDROCHLORIDE 25 MG/1
25 TABLET ORAL ONCE AS NEEDED
Status: DISCONTINUED | OUTPATIENT
Start: 2022-03-12 | End: 2022-03-12 | Stop reason: HOSPADM

## 2022-03-12 RX ORDER — HYDROCODONE BITARTRATE AND ACETAMINOPHEN 5; 325 MG/1; MG/1
1 TABLET ORAL ONCE AS NEEDED
Status: DISCONTINUED | OUTPATIENT
Start: 2022-03-12 | End: 2022-03-12 | Stop reason: HOSPADM

## 2022-03-12 RX ORDER — IPRATROPIUM BROMIDE AND ALBUTEROL SULFATE 2.5; .5 MG/3ML; MG/3ML
3 SOLUTION RESPIRATORY (INHALATION) ONCE AS NEEDED
Status: DISCONTINUED | OUTPATIENT
Start: 2022-03-12 | End: 2022-03-12 | Stop reason: HOSPADM

## 2022-03-12 RX ORDER — ACETAMINOPHEN 325 MG/1
650 TABLET ORAL EVERY 4 HOURS PRN
Status: DISCONTINUED | OUTPATIENT
Start: 2022-03-12 | End: 2022-03-14 | Stop reason: HOSPADM

## 2022-03-12 RX ORDER — FENTANYL CITRATE 50 UG/ML
50 INJECTION, SOLUTION INTRAMUSCULAR; INTRAVENOUS
Status: DISCONTINUED | OUTPATIENT
Start: 2022-03-12 | End: 2022-03-12 | Stop reason: HOSPADM

## 2022-03-12 RX ORDER — GLYCOPYRROLATE 0.2 MG/ML
INJECTION INTRAMUSCULAR; INTRAVENOUS AS NEEDED
Status: DISCONTINUED | OUTPATIENT
Start: 2022-03-12 | End: 2022-03-12 | Stop reason: SURG

## 2022-03-12 RX ORDER — NEOSTIGMINE METHYLSULFATE 1 MG/ML
INJECTION, SOLUTION INTRAVENOUS AS NEEDED
Status: DISCONTINUED | OUTPATIENT
Start: 2022-03-12 | End: 2022-03-12 | Stop reason: SURG

## 2022-03-12 RX ORDER — SODIUM CHLORIDE, SODIUM LACTATE, POTASSIUM CHLORIDE, CALCIUM CHLORIDE 600; 310; 30; 20 MG/100ML; MG/100ML; MG/100ML; MG/100ML
30 INJECTION, SOLUTION INTRAVENOUS CONTINUOUS PRN
Status: DISCONTINUED | OUTPATIENT
Start: 2022-03-12 | End: 2022-03-14

## 2022-03-12 RX ORDER — HYDRALAZINE HYDROCHLORIDE 20 MG/ML
5 INJECTION INTRAMUSCULAR; INTRAVENOUS
Status: DISCONTINUED | OUTPATIENT
Start: 2022-03-12 | End: 2022-03-12 | Stop reason: HOSPADM

## 2022-03-12 RX ORDER — DROPERIDOL 2.5 MG/ML
0.62 INJECTION, SOLUTION INTRAMUSCULAR; INTRAVENOUS ONCE AS NEEDED
Status: DISCONTINUED | OUTPATIENT
Start: 2022-03-12 | End: 2022-03-12 | Stop reason: HOSPADM

## 2022-03-12 RX ORDER — FAMOTIDINE 10 MG/ML
20 INJECTION, SOLUTION INTRAVENOUS ONCE
Status: DISCONTINUED | OUTPATIENT
Start: 2022-03-12 | End: 2022-03-12 | Stop reason: HOSPADM

## 2022-03-12 RX ORDER — PROMETHAZINE HYDROCHLORIDE 25 MG/1
25 TABLET ORAL ONCE AS NEEDED
Status: DISCONTINUED | OUTPATIENT
Start: 2022-03-12 | End: 2022-03-12 | Stop reason: SDUPTHER

## 2022-03-12 RX ORDER — MIDAZOLAM HYDROCHLORIDE 1 MG/ML
1 INJECTION INTRAMUSCULAR; INTRAVENOUS
Status: DISCONTINUED | OUTPATIENT
Start: 2022-03-12 | End: 2022-03-12 | Stop reason: HOSPADM

## 2022-03-12 RX ORDER — LIDOCAINE HYDROCHLORIDE 10 MG/ML
INJECTION, SOLUTION EPIDURAL; INFILTRATION; INTRACAUDAL; PERINEURAL AS NEEDED
Status: DISCONTINUED | OUTPATIENT
Start: 2022-03-12 | End: 2022-03-12 | Stop reason: SURG

## 2022-03-12 RX ORDER — ROCURONIUM BROMIDE 10 MG/ML
INJECTION, SOLUTION INTRAVENOUS AS NEEDED
Status: DISCONTINUED | OUTPATIENT
Start: 2022-03-12 | End: 2022-03-12 | Stop reason: SURG

## 2022-03-12 RX ORDER — POTASSIUM CHLORIDE 7.45 MG/ML
10 INJECTION INTRAVENOUS
Status: DISCONTINUED | OUTPATIENT
Start: 2022-03-12 | End: 2022-03-12 | Stop reason: SDUPTHER

## 2022-03-12 RX ORDER — SODIUM CHLORIDE 0.9 % (FLUSH) 0.9 %
10 SYRINGE (ML) INJECTION EVERY 12 HOURS SCHEDULED
Status: DISCONTINUED | OUTPATIENT
Start: 2022-03-12 | End: 2022-03-12 | Stop reason: HOSPADM

## 2022-03-12 RX ORDER — SODIUM CHLORIDE 0.9 % (FLUSH) 0.9 %
10 SYRINGE (ML) INJECTION AS NEEDED
Status: DISCONTINUED | OUTPATIENT
Start: 2022-03-12 | End: 2022-03-12 | Stop reason: HOSPADM

## 2022-03-12 RX ORDER — ACETAMINOPHEN 160 MG/5ML
650 SOLUTION ORAL EVERY 4 HOURS PRN
Status: DISCONTINUED | OUTPATIENT
Start: 2022-03-12 | End: 2022-03-14 | Stop reason: HOSPADM

## 2022-03-12 RX ORDER — PROPOFOL 10 MG/ML
VIAL (ML) INTRAVENOUS AS NEEDED
Status: DISCONTINUED | OUTPATIENT
Start: 2022-03-12 | End: 2022-03-12 | Stop reason: SURG

## 2022-03-12 RX ORDER — ONDANSETRON 2 MG/ML
4 INJECTION INTRAMUSCULAR; INTRAVENOUS ONCE AS NEEDED
Status: DISCONTINUED | OUTPATIENT
Start: 2022-03-12 | End: 2022-03-12 | Stop reason: HOSPADM

## 2022-03-12 RX ORDER — DROPERIDOL 2.5 MG/ML
0.62 INJECTION, SOLUTION INTRAMUSCULAR; INTRAVENOUS AS NEEDED
Status: DISCONTINUED | OUTPATIENT
Start: 2022-03-12 | End: 2022-03-12 | Stop reason: HOSPADM

## 2022-03-12 RX ORDER — PROMETHAZINE HYDROCHLORIDE 25 MG/1
25 SUPPOSITORY RECTAL ONCE AS NEEDED
Status: DISCONTINUED | OUTPATIENT
Start: 2022-03-12 | End: 2022-03-12 | Stop reason: HOSPADM

## 2022-03-12 RX ORDER — LABETALOL HYDROCHLORIDE 5 MG/ML
5 INJECTION, SOLUTION INTRAVENOUS
Status: DISCONTINUED | OUTPATIENT
Start: 2022-03-12 | End: 2022-03-12 | Stop reason: HOSPADM

## 2022-03-12 RX ORDER — NALOXONE HCL 0.4 MG/ML
0.4 VIAL (ML) INJECTION AS NEEDED
Status: DISCONTINUED | OUTPATIENT
Start: 2022-03-12 | End: 2022-03-12 | Stop reason: SDUPTHER

## 2022-03-12 RX ORDER — LIDOCAINE HYDROCHLORIDE 10 MG/ML
0.5 INJECTION, SOLUTION EPIDURAL; INFILTRATION; INTRACAUDAL; PERINEURAL ONCE AS NEEDED
Status: DISCONTINUED | OUTPATIENT
Start: 2022-03-12 | End: 2022-03-12 | Stop reason: HOSPADM

## 2022-03-12 RX ORDER — MIDAZOLAM HYDROCHLORIDE 1 MG/ML
0.5 INJECTION INTRAMUSCULAR; INTRAVENOUS
Status: DISCONTINUED | OUTPATIENT
Start: 2022-03-12 | End: 2022-03-12 | Stop reason: HOSPADM

## 2022-03-12 RX ORDER — SODIUM CHLORIDE, SODIUM LACTATE, POTASSIUM CHLORIDE, CALCIUM CHLORIDE 600; 310; 30; 20 MG/100ML; MG/100ML; MG/100ML; MG/100ML
9 INJECTION, SOLUTION INTRAVENOUS CONTINUOUS
Status: DISCONTINUED | OUTPATIENT
Start: 2022-03-12 | End: 2022-03-14

## 2022-03-12 RX ORDER — FAMOTIDINE 20 MG/1
20 TABLET, FILM COATED ORAL ONCE
Status: DISCONTINUED | OUTPATIENT
Start: 2022-03-12 | End: 2022-03-12 | Stop reason: HOSPADM

## 2022-03-12 RX ORDER — HYDROMORPHONE HYDROCHLORIDE 1 MG/ML
0.5 INJECTION, SOLUTION INTRAMUSCULAR; INTRAVENOUS; SUBCUTANEOUS
Status: DISCONTINUED | OUTPATIENT
Start: 2022-03-12 | End: 2022-03-12 | Stop reason: HOSPADM

## 2022-03-12 RX ORDER — SODIUM CHLORIDE 0.9 % (FLUSH) 0.9 %
3 SYRINGE (ML) INJECTION EVERY 12 HOURS SCHEDULED
Status: DISCONTINUED | OUTPATIENT
Start: 2022-03-12 | End: 2022-03-12 | Stop reason: HOSPADM

## 2022-03-12 RX ORDER — ONDANSETRON 2 MG/ML
INJECTION INTRAMUSCULAR; INTRAVENOUS AS NEEDED
Status: DISCONTINUED | OUTPATIENT
Start: 2022-03-12 | End: 2022-03-12 | Stop reason: SURG

## 2022-03-12 RX ADMIN — POTASSIUM CHLORIDE 10 MEQ: 7.46 INJECTION, SOLUTION INTRAVENOUS at 08:38

## 2022-03-12 RX ADMIN — Medication 10 ML: at 20:28

## 2022-03-12 RX ADMIN — TAZOBACTAM SODIUM AND PIPERACILLIN SODIUM 3.38 G: 375; 3 INJECTION, SOLUTION INTRAVENOUS at 11:38

## 2022-03-12 RX ADMIN — DEXAMETHASONE SODIUM PHOSPHATE 4 MG: 4 INJECTION, SOLUTION INTRA-ARTICULAR; INTRALESIONAL; INTRAMUSCULAR; INTRAVENOUS; SOFT TISSUE at 14:56

## 2022-03-12 RX ADMIN — SODIUM CHLORIDE, POTASSIUM CHLORIDE, SODIUM LACTATE AND CALCIUM CHLORIDE: 600; 310; 30; 20 INJECTION, SOLUTION INTRAVENOUS at 14:52

## 2022-03-12 RX ADMIN — GLYCOPYRROLATE 0.4 MG: 0.4 INJECTION INTRAMUSCULAR; INTRAVENOUS at 15:24

## 2022-03-12 RX ADMIN — POTASSIUM CHLORIDE 10 MEQ: 7.46 INJECTION, SOLUTION INTRAVENOUS at 03:42

## 2022-03-12 RX ADMIN — ROCURONIUM BROMIDE 35 MG: 10 INJECTION INTRAVENOUS at 14:56

## 2022-03-12 RX ADMIN — NEOSTIGMINE 2.5 MG: 1 INJECTION INTRAVENOUS at 15:24

## 2022-03-12 RX ADMIN — SUGAMMADEX 100 MG: 100 INJECTION, SOLUTION INTRAVENOUS at 15:34

## 2022-03-12 RX ADMIN — PROPOFOL 160 MG: 10 INJECTION, EMULSION INTRAVENOUS at 14:56

## 2022-03-12 RX ADMIN — TAZOBACTAM SODIUM AND PIPERACILLIN SODIUM 3.38 G: 375; 3 INJECTION, SOLUTION INTRAVENOUS at 03:42

## 2022-03-12 RX ADMIN — POTASSIUM CHLORIDE 10 MEQ: 7.46 INJECTION, SOLUTION INTRAVENOUS at 02:38

## 2022-03-12 RX ADMIN — ACETAMINOPHEN 650 MG: 325 TABLET ORAL at 22:36

## 2022-03-12 RX ADMIN — MAGNESIUM SULFATE HEPTAHYDRATE 4 G: 40 INJECTION, SOLUTION INTRAVENOUS at 13:40

## 2022-03-12 RX ADMIN — Medication 10 ML: at 08:38

## 2022-03-12 RX ADMIN — PANTOPRAZOLE SODIUM 40 MG: 40 TABLET, DELAYED RELEASE ORAL at 05:47

## 2022-03-12 RX ADMIN — POTASSIUM CHLORIDE 10 MEQ: 7.46 INJECTION, SOLUTION INTRAVENOUS at 00:32

## 2022-03-12 RX ADMIN — LIDOCAINE HYDROCHLORIDE 40 MG: 10 INJECTION, SOLUTION EPIDURAL; INFILTRATION; INTRACAUDAL; PERINEURAL at 14:56

## 2022-03-12 RX ADMIN — SODIUM CHLORIDE 100 ML/HR: 9 INJECTION, SOLUTION INTRAVENOUS at 10:54

## 2022-03-12 RX ADMIN — POTASSIUM CHLORIDE 10 MEQ: 7.46 INJECTION, SOLUTION INTRAVENOUS at 06:47

## 2022-03-12 RX ADMIN — TAZOBACTAM SODIUM AND PIPERACILLIN SODIUM 3.38 G: 375; 3 INJECTION, SOLUTION INTRAVENOUS at 20:28

## 2022-03-12 RX ADMIN — ONDANSETRON 4 MG: 2 INJECTION INTRAMUSCULAR; INTRAVENOUS at 15:05

## 2022-03-12 NOTE — PROGRESS NOTES
"Patient Name:  Helga Cardenas  YOB: 1955  [id number]    Surgery Progress Note    Date of visit: 3/12/2022      Subjective: No acute events overnight.  Reports less pain and feeling better.  Still has soreness and tenderness palpation in the epigastrium.  Nausea is better.  Denies fevers or chills.          Objective:     /71 (BP Location: Left arm, Patient Position: Lying)   Pulse 67   Temp 98.2 °F (36.8 °C) (Oral)   Resp 18   Ht 152.4 cm (60\")   Wt 55 kg (121 lb 3.2 oz)   LMP  (LMP Unknown) Comment: LAST PAP 5/2020 BY DR DUARTE  SpO2 94%   BMI 23.67 kg/m²     Intake/Output Summary (Last 24 hours) at 3/12/2022 0811  Last data filed at 3/11/2022 2056  Gross per 24 hour   Intake 100 ml   Output --   Net 100 ml       GEN:   Awake, alert, in no acute distress, resting comfortably in bed   CV:   Regular rate and rhythm  L:  Clear to auscultation bilaterally, not labored on room air   Abd:  Soft, not distended, tender to palpation in the epigastrium and right upper quadrant, no rebound tenderness or guarding  Ext:  No cyanosis, clubbing, or edema    Recent labs that are back at this time have been reviewed.           Assessment/ Plan:    Mrs. Cardenas is a 66-year-old lady with history of GERD and hypertension with concern for cholecystitis    #Cholecystitis, concern for choledocholithiasis  -Vital signs stable overnight  -Labs this morning with elevation of her liver function tests, bilirubin up at 3.3 from 1.4,  from 68  -I reviewed her MRCP.  This was read as no obvious evidence of choledocholithiasis, however there does appear to be an abrupt cutoff of her distal cbd potentially concerning for a stone  -I discussed her case with Dr. Brunner this morning. He will evaluate the patient and consider possible ERCP.   -Will follow-up results. Tentative plans for cholecystectomy prior to discharge       Harshad Hdz MD  3/12/2022  08:11 EST      "

## 2022-03-12 NOTE — ED PROVIDER NOTES
Subjective   Ms. Cardenas is a 65 yo female with known history of mostly asymptomatic gallstones who started having RUQ abdominal pain 3-4 days ago that hasn't gone away. She has lost her appetite but has noted that the pain will worsen to a 10/10 if she does so much as take a sip of water. Baseline pain level 3/10. She has some nausea and has vomited a few times with the severe pain episodes. She has taken Tylenol with no obvious benefit. No fever or chills. Mild diarrhea.  Past and social histories reviewed. She is generally pretty healthy. Currently partly responsible for the care of three small children. .      History provided by:  Patient      Review of Systems   Constitutional: Negative.  Negative for chills, diaphoresis and fever.   HENT: Positive for rhinorrhea (chronic).    Respiratory: Negative.  Negative for shortness of breath.    Cardiovascular: Negative.  Negative for chest pain and palpitations.   Gastrointestinal: Positive for abdominal pain, diarrhea, nausea and vomiting.   Neurological: Negative.    Psychiatric/Behavioral: Negative.    All other systems reviewed and are negative.      Past Medical History:   Diagnosis Date   • GERD (gastroesophageal reflux disease)    • Hypertension        No Known Allergies    Past Surgical History:   Procedure Laterality Date   • COLONOSCOPY      2011   • HYSTEROSCOPY ENDOMETRIAL ABLATION     • TONSILLECTOMY      As a child       Family History   Problem Relation Age of Onset   • Diabetes type II Mother    • Hypertension Father    • Breast cancer Neg Hx    • Ovarian cancer Neg Hx        Social History     Socioeconomic History   • Marital status:    Tobacco Use   • Smoking status: Never Smoker   • Smokeless tobacco: Never Used   Substance and Sexual Activity   • Alcohol use: Yes     Alcohol/week: 7.0 standard drinks     Types: 7 Glasses of wine per week     Comment: 1 glass wine/day   • Drug use: No   • Sexual activity: Yes     Partners: Male      Birth control/protection: Post-menopausal           Objective   Physical Exam  Vitals and nursing note reviewed.   Constitutional:       General: She is not in acute distress.     Appearance: Normal appearance.      Comments: Pleasant older female, calm, not showing any outward signs of pain.   HENT:      Head: Atraumatic.      Mouth/Throat:      Comments: Airway patent  Eyes:      General: No scleral icterus.     Conjunctiva/sclera: Conjunctivae normal.   Neck:      Trachea: Phonation normal.   Cardiovascular:      Rate and Rhythm: Normal rate and regular rhythm.      Heart sounds: Normal heart sounds.   Pulmonary:      Effort: Pulmonary effort is normal. No respiratory distress.      Breath sounds: Normal breath sounds.   Abdominal:      Palpations: Abdomen is soft.      Tenderness: There is abdominal tenderness (RUQ with negative Marienthal).   Musculoskeletal:      Cervical back: Neck supple.      Right lower leg: No edema.      Left lower leg: No edema.   Skin:     General: Skin is warm and dry.      Coloration: Skin is not jaundiced.   Neurological:      Mental Status: She is alert and oriented to person, place, and time.   Psychiatric:         Behavior: Behavior normal.         Procedures           ED Course  ED Course as of 03/11/22 1945   Fri Mar 11, 2022   1932 I have interpreted her GB US as showing gallstones and a thickened gallbladder wall. [LI]      ED Course User Index  [LI] Jerel Becerra MD      Recent Results (from the past 24 hour(s))   CBC Auto Differential    Collection Time: 03/11/22  5:26 PM    Specimen: Blood   Result Value Ref Range    WBC 16.21 (H) 3.40 - 10.80 10*3/mm3    RBC 4.61 3.77 - 5.28 10*6/mm3    Hemoglobin 14.3 12.0 - 15.9 g/dL    Hematocrit 41.9 34.0 - 46.6 %    MCV 90.9 79.0 - 97.0 fL    MCH 31.0 26.6 - 33.0 pg    MCHC 34.1 31.5 - 35.7 g/dL    RDW 12.6 12.3 - 15.4 %    RDW-SD 41.8 37.0 - 54.0 fl    MPV 8.6 6.0 - 12.0 fL    Platelets 323 140 - 450 10*3/mm3    Neutrophil % 94.7  (H) 42.7 - 76.0 %    Lymphocyte % 2.9 (L) 19.6 - 45.3 %    Monocyte % 1.5 (L) 5.0 - 12.0 %    Eosinophil % 0.2 (L) 0.3 - 6.2 %    Basophil % 0.1 0.0 - 1.5 %    Immature Grans % 0.6 (H) 0.0 - 0.5 %    Neutrophils, Absolute 15.35 (H) 1.70 - 7.00 10*3/mm3    Lymphocytes, Absolute 0.47 (L) 0.70 - 3.10 10*3/mm3    Monocytes, Absolute 0.24 0.10 - 0.90 10*3/mm3    Eosinophils, Absolute 0.04 0.00 - 0.40 10*3/mm3    Basophils, Absolute 0.02 0.00 - 0.20 10*3/mm3    Immature Grans, Absolute 0.09 (H) 0.00 - 0.05 10*3/mm3    nRBC 0.0 0.0 - 0.2 /100 WBC   Urinalysis With Microscopic If Indicated (No Culture) - Urine, Clean Catch    Collection Time: 03/11/22  5:32 PM    Specimen: Urine, Clean Catch   Result Value Ref Range    Color, UA Yellow Yellow, Straw    Appearance, UA Clear Clear    pH, UA 7.0 5.0 - 8.0    Specific Gravity, UA 1.019 1.001 - 1.030    Glucose, UA Negative Negative    Ketones, UA Trace (A) Negative    Bilirubin, UA Negative Negative    Blood, UA Negative Negative    Protein, UA Negative Negative    Leuk Esterase, UA Negative Negative    Nitrite, UA Negative Negative    Urobilinogen, UA 1.0 E.U./dL 0.2 - 1.0 E.U./dL   Comprehensive Metabolic Panel    Collection Time: 03/11/22  6:36 PM    Specimen: Blood   Result Value Ref Range    Glucose 149 (H) 65 - 99 mg/dL    BUN 14 8 - 23 mg/dL    Creatinine 0.64 0.57 - 1.00 mg/dL    Sodium 133 (L) 136 - 145 mmol/L    Potassium 2.8 (L) 3.5 - 5.2 mmol/L    Chloride 93 (L) 98 - 107 mmol/L    CO2 28.0 22.0 - 29.0 mmol/L    Calcium 9.8 8.6 - 10.5 mg/dL    Total Protein 7.6 6.0 - 8.5 g/dL    Albumin 4.40 3.50 - 5.20 g/dL    ALT (SGPT) 35 (H) 1 - 33 U/L    AST (SGOT) 68 (H) 1 - 32 U/L    Alkaline Phosphatase 164 (H) 39 - 117 U/L    Total Bilirubin 1.4 (H) 0.0 - 1.2 mg/dL    Globulin 3.2 gm/dL    A/G Ratio 1.4 g/dL    BUN/Creatinine Ratio 21.9 7.0 - 25.0    Anion Gap 12.0 5.0 - 15.0 mmol/L    eGFR 98.2 >60.0 mL/min/1.73   Lipase    Collection Time: 03/11/22  6:36 PM    Specimen:  "Blood   Result Value Ref Range    Lipase 14 13 - 60 U/L     Note: In addition to lab results from this visit, the labs listed above may include labs taken at another facility or during a different encounter within the last 24 hours. Please correlate lab times with ED admission and discharge times for further clarification of the services performed during this visit.    US Gallbladder   Final Result   Gallbladder sludge and mobile shadowing stones with moderate diffuse   thickening of the gallbladder wall, without pericholecystic fluid. In   the setting of leukocytosis and reported right upper quadrant pain, the   findings are suspicious for early acute cholecystitis.       This report was finalized on 3/11/2022 7:23 PM by Vidal Puga MD.          MRI abdomen wo contrast mrcp    (Results Pending)     Vitals:    03/11/22 1708 03/11/22 1835 03/11/22 1900   BP: 144/73 146/68 124/60   BP Location: Left arm     Patient Position: Sitting     Pulse: 84     Resp: 16     Temp: 97.9 °F (36.6 °C)     TempSrc: Oral     SpO2: 100% 95% 93%   Weight: 56.7 kg (125 lb)     Height: 152.4 cm (60\")       Medications   piperacillin-tazobactam (ZOSYN) 4.5 g in iso-osmotic dextrose 100 mL IVPB (premix) (has no administration in time range)   piperacillin-tazobactam (ZOSYN) 3.375 g/100 mL 0.9% NS IVPB (mbp) (has no administration in time range)     ECG/EMG Results (last 24 hours)     ** No results found for the last 24 hours. **        No orders to display                                                    MDM    Final diagnoses:   Acute cholecystitis   Calculus of gallbladder with acute cholecystitis without obstruction   Hypokalemia       ED Disposition  ED Disposition     ED Disposition   Decision to Admit    Condition   --    Comment   Level of Care: Telemetry [5]   Diagnosis: Cholecystitis [269639]   Certification: I Certify That Inpatient Hospital Services Are Medically Necessary For Greater Than 2 Midnights               No " follow-up provider specified.       Medication List      No changes were made to your prescriptions during this visit.          Jerel Becerra MD  03/11/22 4950

## 2022-03-12 NOTE — ANESTHESIA POSTPROCEDURE EVALUATION
Patient: Helga Molina [facility]    Procedure Summary     Date: 03/12/22 Room / Location:  [facility] /  [facility]    Anesthesia Start: 1453 Anesthesia Stop: 1536    Procedure: ENDOSCOPIC RETROGRADE CHOLANGIOPANCREATOGRAPHY (N/A ) Diagnosis:       Acute cholecystitis      Calculus of gallbladder with acute cholecystitis without obstruction      (Acute cholecystitis [K81.0])      (Calculus of gallbladder with acute cholecystitis without obstruction [K80.00])    Surgeons: Brunner, Mark I, MD Provider: Capo Mcmahon MD    Anesthesia Type: general ASA Status: 2          Anesthesia Type: general    Vitals  No vitals data found for the desired time range.          Post Anesthesia Care and Evaluation    Patient location during evaluation: PACU  Patient participation: complete - patient participated  Level of consciousness: sleepy but conscious  Pain management: adequate  Airway patency: patent  Anesthetic complications: No anesthetic complications  PONV Status: none  Cardiovascular status: hemodynamically stable and acceptable  Respiratory status: nonlabored ventilation, acceptable and nasal cannula  Hydration status: acceptable    Comments: HR 87  /69  RR 18  O2 sat 99%

## 2022-03-12 NOTE — H&P
[facility] Medicine Services  HISTORY AND PHYSICAL    Patient Name: Helga Cardenas  : 1955  MRN: [id number]  Primary Care Physician: Rey Desouza MD  Date of admission: 3/11/2022      Subjective   Subjective     Chief Complaint: Abdominal pain    HPI:  Helga Cardenas is a 66 y.o. female with history of hypertension, GERD, prior history of gallstones who presented to the ED with 4 days of progressively, intermittent, worsening epigastric abdominal pain, radiating to her back with associated nausea and vomiting, she denies any fevers or chills.  Arrival here she is hemodynamically stable, initial work-up revealed potassium 2.8, WBC 16.2, AST 68, alk phos 164, ALT 35, T bili 1.4.  GB ultrasound shows sludge and mobile shadowing stones.  Patient started on IV antibiotics, general surgery consulted and hospital medicine was asked to admit.      COVID Details:    Symptoms:    [] NONE [] Fever []  Cough [] Shortness of breath [] Change in taste/smell    Review of Systems   Gen- No fevers, chills  CV- No chest pain, palpitations  Resp- No cough, dyspnea  GI- +N/V, +abd pain    All other systems reviewed and are negative.     Personal History     Past Medical History:   Diagnosis Date   • GERD (gastroesophageal reflux disease)    • Hypertension        Past Surgical History:   Procedure Laterality Date   • COLONOSCOPY         • HYSTEROSCOPY ENDOMETRIAL ABLATION     • TONSILLECTOMY      As a child       Family History: family history includes Diabetes type II in her mother; Hypertension in her father. Otherwise pertinent FHx was reviewed and unremarkable.     Social History:  reports that she has never smoked. She has never used smokeless tobacco. She reports current alcohol use of about 7.0 standard drinks of alcohol per week. She reports that she does not use drugs.  Social History     Social History Narrative   • Not on file       Medications:  Available  home medication information reviewed.  (Not in a hospital admission)      No Known Allergies    Objective   Objective     Vital Signs:   Temp:  [97.9 °F (36.6 °C)] 97.9 °F (36.6 °C)  Heart Rate:  [84] 84  Resp:  [16] 16  BP: (124-146)/(60-73) 125/70       Physical Exam   Constitutional: Awake, alert  Eyes: PERRLA, sclerae anicteric, no conjunctival injection  HENT: NCAT, mucous membranes moist  Neck: Supple, no thyromegaly, no lymphadenopathy, trachea midline  Respiratory: Clear to auscultation bilaterally, nonlabored respirations   Cardiovascular: RRR, no murmurs, rubs, or gallops, palpable pedal pulses bilaterally  Gastrointestinal: Positive bowel sounds, epigastric ttp, nondistended  Musculoskeletal: No bilateral ankle edema, no clubbing or cyanosis to extremities  Psychiatric: Appropriate affect, cooperative  Neurologic: Oriented x 3, strength symmetric in all extremities, Cranial Nerves grossly intact to confrontation, speech clear  Skin: No rashes    Result Review:  I have personally reviewed the results from the time of this admission to 3/11/2022 22:00 EST and agree with these findings:  [x]  Laboratory  []  Microbiology  [x]  Radiology  []  EKG/Telemetry   []  Cardiology/Vascular   []  Pathology  []  Old records  []  Other:  Most notable findings include:      LAB RESULTS:      Lab 03/11/22  1726   WBC 16.21*   HEMOGLOBIN 14.3   HEMATOCRIT 41.9   PLATELETS 323   NEUTROS ABS 15.35*   IMMATURE GRANS (ABS) 0.09*   LYMPHS ABS 0.47*   MONOS ABS 0.24   EOS ABS 0.04   MCV 90.9         Lab 03/11/22  1836   SODIUM 133*   POTASSIUM 2.8*   CHLORIDE 93*   CO2 28.0   ANION GAP 12.0   BUN 14   CREATININE 0.64   EGFR 98.2   GLUCOSE 149*   CALCIUM 9.8         Lab 03/11/22  1836   TOTAL PROTEIN 7.6   ALBUMIN 4.40   GLOBULIN 3.2   ALT (SGPT) 35*   AST (SGOT) 68*   BILIRUBIN 1.4*   ALK PHOS 164*   LIPASE 14                     UA    Urinalysis 4/6/21 3/11/22   Specific Bayard, UA  1.019   Ketones, UA 15 mg/dL (A) Trace (A)    Blood, UA  Negative   Leukocytes, UA Negative Negative   Nitrite, UA  Negative   (A) Abnormal value              Microbiology Results (last 10 days)     Procedure Component Value - Date/Time    COVID PRE-OP / PRE-PROCEDURE SCREENING ORDER (NO ISOLATION) - Swab, Nasopharynx [[id number]]  (Normal) Collected: 03/11/22 2018    Lab Status: Final result Specimen: Swab from Nasopharynx Updated: 03/11/22 2047    Narrative:      The following orders were created for panel order COVID PRE-OP / PRE-PROCEDURE SCREENING ORDER (NO ISOLATION) - Swab, Nasopharynx.  Procedure                               Abnormality         Status                     ---------                               -----------         ------                     COVID-19, ABBOTT IN-HOUS...[[id number]]  Normal              Final result                 Please view results for these tests on the individual orders.    COVID-19, ABBOTT IN-HOUSE,NASAL Swab (NO TRANSPORT MEDIA) 2 HR TAT - Swab, Nasopharynx [[id number]]  (Normal) Collected: 03/11/22 2018    Lab Status: Final result Specimen: Swab from Nasopharynx Updated: 03/11/22 2047     COVID19 Presumptive Negative    Narrative:      Fact sheet for providers: https://www.fda.gov/media/474812/download     Fact sheet for patients: https://www.fda.gov/media/334497/download    Test performed by PCR.  If inconsistent with clinical signs and symptoms patient should be tested with different authorized molecular test.          US Gallbladder    Result Date: 3/11/2022  DATE OF EXAM: 3/11/2022 6:19 PM  PROCEDURE: US GALLBLADDER-  INDICATIONS: three days RUQ pain, history of gallstones  COMPARISON: Abdominal ultrasound 2/28/2020  TECHNIQUE: Transverse and sagittal grayscale sonographic assessment of the right upper quadrant supplemented with color and spectral Doppler.  FINDINGS: The visualized portions of the pancreas appear normal.  Mild increased echogenicity of the liver parenchyma suggestive of some hepatic steatosis.  No focal hepatic lesion. No intrahepatic ductal dilatation. The portal vein is patent with hepatopetal flow. The right hepatic vein is patent.  There is some dependent sludge within the gallbladder. A few mobile dependent echogenic and shadowing gallstones are also noted within the sludge. Moderate diffuse wall thickening measuring up to 6 mm. No pericholecystic fluid. Negative sonographic Pro sign.  Mild prominence of the common bile duct measuring 9 mm in diameter.  Normal appearance of the right kidney.       Impression: Gallbladder sludge and mobile shadowing stones with moderate diffuse thickening of the gallbladder wall, without pericholecystic fluid. In the setting of leukocytosis and reported right upper quadrant pain, the findings are suspicious for early acute cholecystitis.  This report was finalized on 3/11/2022 7:23 PM by Vidal Puga MD.            Assessment/Plan   Assessment & Plan     Active Hospital Problems    Diagnosis  POA   • **Cholecystitis [K81.9]  Yes   • GERD (gastroesophageal reflux disease) [K21.9]  Yes   • Essential hypertension [I10]  Yes     Impression: 07/21/2015 - she is going to decrease her ETOH intake. Monitor BP at home. 4 month f/u. Continue with her daily walking.  Impression: 03/17/2015 - has not taken her BP med yet. lipids, cmp, urine micro  Impression: 12/11/2014 - continue losartan 25 mg daily  Impression: 11/28/2014 - ACE= cough. SE with metoprolol. start losartan 25 mg qd. 2 week f/u;        66-year-old female with history of hypertension, and GERD who presented with abdominal pain, work-up concerning for acute cholecystitis.    Acute cholecystitis  -Gallbladder ultrasound with sludge and stones  -Patient has elevated LFTs, MRCP done, read pending  -Continue IV fluids, and antibiotics with Zosyn  -General surgery has been consulted, if MRCP is negative and LFTs stable, plan for CCY in AM, keep n.p.o. after midnight    Hypertension  -BP currently stable, will hold  losartan-HCTZ for now continue IV fluids as above    Hypokalemia  -Monitor replete per protocol, check a magnesium level    GERD-continue PPI    DVT prophylaxis: Chronic,    CODE STATUS: Full code  Code Status and Medical Interventions:   Ordered at: 03/11/22 2200     Code Status (Patient has no pulse and is not breathing):    CPR (Attempt to Resuscitate)     Medical Interventions (Patient has pulse or is breathing):    Full Support       Chelsea Colunga MD  03/11/22

## 2022-03-12 NOTE — PLAN OF CARE
Goal Outcome Evaluation:  Plan of Care Reviewed With: patient           Outcome Evaluation: Pt arrived from ED, VSS on room air, ambulating independenty, A&O x4, complaints of nausea, potassium being replaced, NPO for surgery consult this AM

## 2022-03-12 NOTE — CONSULTS
General Surgery Consultation Note    Date of Service: 3/11/2022  Helga Cardenas  [id number]  1955      Referring Provider: Laurie Gordon MD    Location of Consult: Emergency department     Reason for Consultation: Concern for cholecystitis       History of Present Illness:  I am seeing, Helga Cardenas, in consultation at request of Laurie Gordon MD regarding concern for cholecystitis.  She is a 66-year-old lady with history of GERD and hypertension who presents to [facility] with complaints of 4 days of abdominal pain.  She reports that starting on Tuesday she began to experience epigastric abdominal pain which was associated with nausea and vomiting.  This started shortly after having a meal of Gurmeet noodles.  She had several episodes of nausea and vomiting that night.  Ever since then she has felt poorly.  Pain has been intermittent and comes and goes.  Pain is located in her epigastrium and radiates to her back.  She has had some chills but no fevers.  She has had decreased appetite.  She has been able to tolerate a diet however with only intermittent nausea.  She experienced episodes similar to this in the past and was told she had gallstones and had tentatively plan for cholecystectomy in the past, however she decided not to pursue this.  She does not take anticoagulants.  She has never had any prior abdominal surgeries..      Problems Addressed this Visit    None     Visit Diagnoses     Acute cholecystitis    -  Primary    Calculus of gallbladder with acute cholecystitis without obstruction        Hypokalemia          Diagnoses       Codes Comments    Acute cholecystitis    -  Primary ICD-10-CM: K81.0  ICD-9-CM: 575.0     Calculus of gallbladder with acute cholecystitis without obstruction     ICD-10-CM: K80.00  ICD-9-CM: 574.00     Hypokalemia     ICD-10-CM: E87.6  ICD-9-CM: 276.8           PMHx:  Past Medical History:   Diagnosis Date   • GERD (gastroesophageal reflux  disease)    • Hypertension        Past Surgical History:  Past Surgical History:   • COLONOSCOPY    2011   • HYSTEROSCOPY ENDOMETRIAL ABLATION   • TONSILLECTOMY    As a child       Allergies:  No Known Allergies    Medications:  No current facility-administered medications on file prior to encounter.     Current Outpatient Medications on File Prior to Encounter   Medication Sig Dispense Refill   • cetirizine (zyrTEC) 10 MG tablet Take 10 mg by mouth Daily.     • estradiol (ESTRACE) 0.5 MG tablet Take 1 tablet by mouth Daily.     • losartan-hydrochlorothiazide (HYZAAR) 100-25 MG per tablet TAKE ONE TABLET BY MOUTH DAILY 90 tablet 1   • pantoprazole (PROTONIX) 40 MG EC tablet TAKE ONE TABLET BY MOUTH DAILY 90 tablet 1   • levocetirizine (XYZAL) 5 MG tablet Take 5 mg by mouth Every Evening.     • Triamcinolone Acetonide (NASACORT) 55 MCG/ACT nasal inhaler 2 sprays into the nostril(s) as directed by provider Daily As Needed.           Current Facility-Administered Medications:   •  ondansetron (ZOFRAN) tablet 4 mg, 4 mg, Oral, Q6H PRN, Laurie Gordon MD, 4 mg at 03/11/22 2031  •  [START ON 3/12/2022] piperacillin-tazobactam (ZOSYN) 3.375 g/100 mL 0.9% NS IVPB (mbp), 3.375 g, Intravenous, Q8H, Laurie Gordon MD    Current Outpatient Medications:   •  cetirizine (zyrTEC) 10 MG tablet, Take 10 mg by mouth Daily., Disp: , Rfl:   •  estradiol (ESTRACE) 0.5 MG tablet, Take 1 tablet by mouth Daily., Disp: , Rfl:   •  losartan-hydrochlorothiazide (HYZAAR) 100-25 MG per tablet, TAKE ONE TABLET BY MOUTH DAILY, Disp: 90 tablet, Rfl: 1  •  pantoprazole (PROTONIX) 40 MG EC tablet, TAKE ONE TABLET BY MOUTH DAILY, Disp: 90 tablet, Rfl: 1  •  levocetirizine (XYZAL) 5 MG tablet, Take 5 mg by mouth Every Evening., Disp: , Rfl:   •  Triamcinolone Acetonide (NASACORT) 55 MCG/ACT nasal inhaler, 2 sprays into the nostril(s) as directed by provider Daily As Needed., Disp: , Rfl:       Family History:  Family History   Problem Relation Age of  "Onset   • Diabetes type II Mother    • Hypertension Father    • Breast cancer Neg Hx    • Ovarian cancer Neg Hx        Social History: Pt lives in [facility].    Tobacco use: Denies     EtOH use : 1 drink per day   Illicit drug use: Denies       Review of Systems:   Constitutional: No fevers, chills or malaise   Eyes: Denies visual changes    Cardiovascular: Denies chest pain, palpitations   Pulmonary: Denies cough or shortness of breath   Abdominal/ GI: See HPI    Genitourinary: Denies dysuria or hematuria   Musculoskeletal: Denies any but chronic joint aches, pains or deformities   Psychiatric: No recent mood changes   Neurologic: No paresthesias or loss of function    /70   Pulse 84   Temp 97.9 °F (36.6 °C) (Oral)   Resp 16   Ht 152.4 cm (60\")   Wt 56.7 kg (125 lb)   LMP  (LMP Unknown) Comment: LAST PAP 5/2020 BY DR DUARTE  SpO2 96%   BMI 24.41 kg/m²   Body mass index is 24.41 kg/m².    Gen: Awake, alert, mild distress due to nausea, sitting up in the bed  Head: Normocephalic, atraumatic.   Eyes: Pupils equal, round, react to light and accommodation.   Mouth: Oral mucosa without lesions,   Neck: No masses, lymphadenopathy or carotid bruits bilaterally   CV: Rhythm and rate regular, no murmurs, rubs or gallops  Lungs: Clear to auscultation bilaterally, not labored on room air   Abdomen: Soft, not distended, no obvious scars, tender to palpation in the epigastrium right upper quadrant, no rebound tenderness or guarding  Groin : No obvious hernias bilaterally   Extremities:  No cyanosis, clubbing or edema bilaterally  Lymphatics: No abnormal lymphadenopathy appreciated   Neurologic: No gross deficits         CBC  Results from last 7 days   Lab Units 03/11/22  1726   WBC 10*3/mm3 16.21*   HEMOGLOBIN g/dL 14.3   HEMATOCRIT % 41.9   PLATELETS 10*3/mm3 323       CMP  Results from last 7 days   Lab Units 03/11/22  1836   SODIUM mmol/L 133*   POTASSIUM mmol/L 2.8*   CHLORIDE mmol/L 93*   CO2 " mmol/L 28.0   BUN mg/dL 14   CREATININE mg/dL 0.64   CALCIUM mg/dL 9.8   BILIRUBIN mg/dL 1.4*   ALK PHOS U/L 164*   ALT (SGPT) U/L 35*   AST (SGOT) U/L 68*   GLUCOSE mg/dL 149*       Radiology  Imaging Results (Last 72 Hours)     Procedure Component Value Units Date/Time    US Gallbladder [[id number]] Collected: 03/11/22 1916     Updated: 03/11/22 1926    Narrative:      DATE OF EXAM: 3/11/2022 6:19 PM     PROCEDURE: US GALLBLADDER-     INDICATIONS: three days RUQ pain, history of gallstones     COMPARISON: Abdominal ultrasound 2/28/2020     TECHNIQUE: Transverse and sagittal grayscale sonographic assessment of  the right upper quadrant supplemented with color and spectral Doppler.     FINDINGS:  The visualized portions of the pancreas appear normal.     Mild increased echogenicity of the liver parenchyma suggestive of some  hepatic steatosis. No focal hepatic lesion. No intrahepatic ductal  dilatation. The portal vein is patent with hepatopetal flow. The right  hepatic vein is patent.     There is some dependent sludge within the gallbladder. A few mobile  dependent echogenic and shadowing gallstones are also noted within the  sludge. Moderate diffuse wall thickening measuring up to 6 mm. No  pericholecystic fluid. Negative sonographic Pro sign.     Mild prominence of the common bile duct measuring 9 mm in diameter.     Normal appearance of the right kidney.          Impression:      Gallbladder sludge and mobile shadowing stones with moderate diffuse  thickening of the gallbladder wall, without pericholecystic fluid. In  the setting of leukocytosis and reported right upper quadrant pain, the  findings are suspicious for early acute cholecystitis.     This report was finalized on 3/11/2022 7:23 PM by Vidal Puga MD.                 Results Review: I have personally reviewed all of the recent lab and imaging results available at this time.     Assessment:  Mrs. Cardenas is a 66-year-old lady with history of  GERD and hypertension with concern for cholecystitis.  Vital signs are stable.  Labs demonstrate a leukocytosis of 16,000, as well as mild elevation of her liver function tests with a total bilirubin of 1.4 and AST of 35 and ALT of 68.  I have personally reviewed a right upper quadrant ultrasound, this demonstrates stones and sludge in the gallbladder as well as concern for gallbladder wall thickening at 6 mm and a common bile duct of 9 mm.  She provides a clinical history which is consistent with cholecystitis.  Given her mild biliary dilation on ultrasound and mild elevation of liver function tests we will plan to obtain an MRCP this evening.  If MRCP negative for choledocholithiasis and LFTs tomorrow are not worsened, we will tentatively plan for cholecystectomy      Plan:  -Keep n.p.o.  -Obtain preop Covid test  -IV fluids  -IV antibiotics  -MRCP ordered to be completed this evening  -As needed pain control and antiemetics  -Possible cholecystectomy tomorrow      I discussed the patient's findings and my recommendations with the patient and/or family, as well as the primary team     Harshad Hdz MD  03/11/22  20:43 EST      Part of this note may be an electronic transcription/translation of spoken language to printed text using the Dragon Dictation System.

## 2022-03-12 NOTE — PLAN OF CARE
Goal Outcome Evaluation:              Outcome Evaluation: patient is alert x4, roomair, received magnesium and potassium, up ad amy, no c/o at this time, will cont to follow POC

## 2022-03-12 NOTE — PROGRESS NOTES
[facility] Medicine Services  PROGRESS NOTE    Patient Name: Helga Cardenas  : 1955  MRN: [id number]    Date of Admission: 3/11/2022  Primary Care Physician: Rey Desouza MD    Subjective   Subjective     CC:  F/U abdominal pain    HPI:  She is feeling better today but still sore and weak.    ROS:  Gen- No fevers, chills today but had some last night  CV- No chest pain, palpitations  Resp- No cough, dyspnea  GI- Improved pain, no vomiting    Objective   Objective     Vital Signs:   Temp:  [97.9 °F (36.6 °C)-98.7 °F (37.1 °C)] 98.2 °F (36.8 °C)  Heart Rate:  [63-88] 76  Resp:  [16-18] 18  BP: (117-153)/(60-82) 131/71     Physical Exam:  Constitutional: No acute distress, awake, alert, laying in bed  HENT: NCAT, mucous membranes moist  Respiratory: Clear to auscultation bilaterally, respiratory effort normal   Cardiovascular: RRR, no murmurs, rubs, or gallops  Gastrointestinal: Positive bowel sounds, soft, nontender, nondistended  Musculoskeletal: No bilateral ankle edema  Psychiatric: Appropriate affect, cooperative  Neurologic: Cranial Nerves grossly intact to confrontation, speech clear  Skin: No rashes on exposed surfaces    Results Reviewed:  LAB RESULTS:      Lab 22  1004 22  1726   WBC 15.57* 14.98* 16.21*   HEMOGLOBIN 12.0 14.0 14.3   HEMATOCRIT 36.5 40.4 41.9   PLATELETS 255 293 323   NEUTROS ABS 13.68*  --  15.35*   IMMATURE GRANS (ABS) 0.10*  --  0.09*   LYMPHS ABS 0.69*  --  0.47*   MONOS ABS 1.01*  --  0.24   EOS ABS 0.05  --  0.04   MCV 94.8 90.6 90.9   LACTATE  --  1.2  --          Lab 22  1004 223 22  1836   SODIUM 134* 132* 133*   POTASSIUM 3.4* 2.5* 2.8*   CHLORIDE 98 91* 93*   CO2 27.0 27.0 28.0   ANION GAP 9.0 14.0 12.0   BUN 12 16 14   CREATININE 0.54* 0.68 0.64   EGFR 102.3 96.8 98.2   GLUCOSE 99 171* 149*   CALCIUM 8.7 9.7 9.8   MAGNESIUM 1.7 1.6 1.7         Lab 22  1004 22  1015  03/11/22  1836   TOTAL PROTEIN 6.1 7.3 7.6   ALBUMIN 3.50 4.20 4.40   GLOBULIN 2.6 3.1 3.2   ALT (SGPT) 114* 133* 35*   AST (SGOT) 122* 340* 68*   BILIRUBIN 1.0 3.3* 1.4*   ALK PHOS 166* 218* 164*   LIPASE  --   --  14                     Brief Urine Lab Results  (Last result in the past 365 days)      Color   Clarity   Blood   Leuk Est   Nitrite   Protein   CREAT   Urine HCG        03/11/22 1732 Yellow   Clear   Negative   Negative   Negative   Negative                 Microbiology Results Abnormal     Procedure Component Value - Date/Time    COVID PRE-OP / PRE-PROCEDURE SCREENING ORDER (NO ISOLATION) - Swab, Nasopharynx [[id number]]  (Normal) Collected: 03/11/22 2018    Lab Status: Final result Specimen: Swab from Nasopharynx Updated: 03/11/22 2047    Narrative:      The following orders were created for panel order COVID PRE-OP / PRE-PROCEDURE SCREENING ORDER (NO ISOLATION) - Swab, Nasopharynx.  Procedure                               Abnormality         Status                     ---------                               -----------         ------                     COVID-19, ABBOTT IN-HOUS...[[id number]]  Normal              Final result                 Please view results for these tests on the individual orders.    COVID-19, ABBOTT IN-HOUSE,NASAL Swab (NO TRANSPORT MEDIA) 2 HR TAT - Swab, Nasopharynx [[id number]]  (Normal) Collected: 03/11/22 2018    Lab Status: Final result Specimen: Swab from Nasopharynx Updated: 03/11/22 2047     COVID19 Presumptive Negative    Narrative:      Fact sheet for providers: https://www.fda.gov/media/172404/download     Fact sheet for patients: https://www.fda.gov/media/875714/download    Test performed by PCR.  If inconsistent with clinical signs and symptoms patient should be tested with different authorized molecular test.          US Gallbladder    Result Date: 3/11/2022  DATE OF EXAM: 3/11/2022 6:19 PM  PROCEDURE: US GALLBLADDER-  INDICATIONS: three days RUQ pain, history of  gallstones  COMPARISON: Abdominal ultrasound 2/28/2020  TECHNIQUE: Transverse and sagittal grayscale sonographic assessment of the right upper quadrant supplemented with color and spectral Doppler.  FINDINGS: The visualized portions of the pancreas appear normal.  Mild increased echogenicity of the liver parenchyma suggestive of some hepatic steatosis. No focal hepatic lesion. No intrahepatic ductal dilatation. The portal vein is patent with hepatopetal flow. The right hepatic vein is patent.  There is some dependent sludge within the gallbladder. A few mobile dependent echogenic and shadowing gallstones are also noted within the sludge. Moderate diffuse wall thickening measuring up to 6 mm. No pericholecystic fluid. Negative sonographic Pro sign.  Mild prominence of the common bile duct measuring 9 mm in diameter.  Normal appearance of the right kidney.       Impression: Gallbladder sludge and mobile shadowing stones with moderate diffuse thickening of the gallbladder wall, without pericholecystic fluid. In the setting of leukocytosis and reported right upper quadrant pain, the findings are suspicious for early acute cholecystitis.  This report was finalized on 3/11/2022 7:23 PM by Vidal Puga MD.      MRI abdomen wo contrast mrcp    Result Date: 3/11/2022  DATE OF EXAM: 3/11/2022 8:49 PM  PROCEDURE: MRI ABDOMEN WO CONTRAST MRCP-  INDICATIONS: cholecystatic pattern; K81.0-Acute cholecystitis; K80.00-Calculus of gallbladder with acute cholecystitis without obstruction; E87.6-Hypokalemia  COMPARISON: Gallbladder ultrasound 3/11/2022  TECHNIQUE: Multiplanar/multisequence MRI imaging of the abdomen was performed without gadolinium contrast administration.  FINDINGS: Distended gallbladder with dependent sludge and a few dependent subcentimeter gallstones. No definite evidence of stone within the gallbladder neck or cystic duct. There is questionable pericholecystic edema or fluid (series 2 image 23), although a  component of this appearance could reflect respiratory motion artifact. No evidence of choledocholithiasis. Mild diffuse prominence of the common bile duct measuring up to 9 mm in diameter. Mild central intrahepatic ductal prominence.  Scattered subcentimeter T2 hyperintensities in the liver likely reflect tiny hepatic cysts or biliary hamartomas.  Normal appearance of the pancreas. Normal appearance of the main pancreatic duct without pancreatic ductal dilatation.  The lung bases appear grossly clear.  Unremarkable appearance of the spleen.  Unremarkable appearance of the kidneys.  The imaged portions of the GI tract appear unremarkable.      Impression: Distended gallbladder with dependent sludge and a few subcentimeter stones within the gallbladder lumen. There is questionable pericholecystic edema or fluid although a component of this appearance could reflect respiratory motion artifact. Overall findings remain concerning for acute cholecystitis. A distinct stone is not identified within the gallbladder neck or cystic duct, however.  Diffuse prominence of the common bile duct measuring up to 9 mm in diameter, with mild central intrahepatic ductal prominence, without definite evidence of choledocholithiasis.  This report was finalized on 3/11/2022 10:27 PM by Vidal Puga MD.            I have reviewed the medications:  Scheduled Meds:pantoprazole, 40 mg, Oral, Daily  piperacillin-tazobactam, 3.375 g, Intravenous, Q8H  sodium chloride, 10 mL, Intravenous, Q12H      Continuous Infusions:sodium chloride, 100 mL/hr, Last Rate: 100 mL/hr (03/11/22 8117)      PRN Meds:.•  HYDROcodone-acetaminophen  •  HYDROmorphone **AND** naloxone  •  magnesium sulfate **OR** magnesium sulfate **OR** magnesium sulfate  •  ondansetron  •  potassium chloride **OR** potassium chloride **OR** potassium chloride  •  sodium chloride    Assessment/Plan   Assessment & Plan     Active Hospital Problems    Diagnosis  POA   • **Cholecystitis  [K81.9]  Yes   • GERD (gastroesophageal reflux disease) [K21.9]  Yes   • Essential hypertension [I10]  Yes      Resolved Hospital Problems   No resolved problems to display.        Brief Hospital Course to date:  Helga Cardenas is a 66 y.o. female with GERD and HTN who presented with abdominal pain and workup concerning for acute cholecystitis with possible choledocholithiasis.     This patient's problems and plans were partially entered by my partner and updated as appropriate by me 03/12/22.    Acute cholecystitis, possible choledocholithiasis   -Seen by general surgery  -GI evaluation-planning ERCP today  -Plans for cholecystectomy prior to discharge  -Continue pain and nausea control     Hypertension  -Losartan-HCTZ on hold, resume as needed     Hypokalemia  -Replace  -STAT labs ordered this AM so that this can be reevaluated and addressed prior to any necessary procedures    DVT prophylaxis:  Mechanical DVT prophylaxis orders are present.       AM-PAC 6 Clicks Score (PT): 24 (03/12/22 0800)    Disposition: I expect the patient to be discharged pending completion of CCY and surgical clearance.    CODE STATUS:   Code Status and Medical Interventions:   Ordered at: 03/11/22 2200     Code Status (Patient has no pulse and is not breathing):    CPR (Attempt to Resuscitate)     Medical Interventions (Patient has pulse or is breathing):    Full Support       Alicia Lemus MD  03/12/22

## 2022-03-12 NOTE — ANESTHESIA PROCEDURE NOTES
Airway  Urgency: elective    Date/Time: 3/12/2022 2:58 PM  Airway not difficult    General Information and Staff    Patient location during procedure: OR  CRNA: Rico Parikh CRNA    Indications and Patient Condition  Indications for airway management: airway protection    Preoxygenated: yes  MILS not maintained throughout  Mask difficulty assessment: 1 - vent by mask    Final Airway Details  Final airway type: endotracheal airway      Successful airway: ETT  Cuffed: yes   Successful intubation technique: direct laryngoscopy  Facilitating devices/methods: anterior pressure/BURP  Endotracheal tube insertion site: oral  Blade: Jerald  Blade size: 3  ETT size (mm): 7.0  Cormack-Lehane Classification: grade IIa - partial view of glottis  Placement verified by: chest auscultation and capnometry   Cuff volume (mL): 5  Measured from: lips  ETT/EBT  to lips (cm): 20  Number of attempts at approach: 1  Assessment: lips, teeth, and gum same as pre-op and atraumatic intubation    Additional Comments  Negative epigastric sounds, Breath sound equal bilaterally with symmetric chest rise and fall

## 2022-03-12 NOTE — ANESTHESIA PREPROCEDURE EVALUATION
Anesthesia Evaluation     Patient summary reviewed and Nursing notes reviewed                Airway   Dental      Pulmonary - negative pulmonary ROS   Cardiovascular     (+) hypertension,       Neuro/Psych- negative ROS  GI/Hepatic/Renal/Endo    (+)  GERD,      Musculoskeletal (-) negative ROS    Abdominal    Substance History - negative use     OB/GYN negative ob/gyn ROS         Other                        Anesthesia Plan    ASA 2     general     intravenous induction     Anesthetic plan, all risks, benefits, and alternatives have been provided, discussed and informed consent has been obtained with: patient.    Plan discussed with CRNA.        CODE STATUS:    Code Status (Patient has no pulse and is not breathing): CPR (Attempt to Resuscitate)  Medical Interventions (Patient has pulse or is breathing): Full Support

## 2022-03-12 NOTE — CONSULTS
[facility] [place] [facility] Consult    Referring Provider: No ref. provider found    PCP: Rey Desouza MD    Reason for Consultation: Choledocholithiasis, need of ERCP.    Chief complaint: Abdominal pain    History of present illness:  Helga Cardenas is a 66 y.o. female who is admitted with a 4-day onset of worsening epigastric and right upper quadrant abdominal pain with associated nausea and vomiting.  Underwent extensive work-up showing gallbladder with numerous stones and sludge but normal caliber duct.  Initial LFTs were mildly elevated; overnight these have worsened and appear cholestatic and obstructive with alk phos 218, , , and total bilirubin of 3.3.  Patient also presented with leukocytosis.  Upon further history, patient reports that she had been having intermittent bouts of right upper quadrant abdominal pain since the beginning of the COVID-19 pandemic with waxing/waning of symptoms never lasting more than 1 to 2 days in nature.  At time of exam, denies any N/V, shortness of breath, chest pain, fever/chills; does have some right upper quadrant abdominal pain also notes some leg cramping (this has been going on for some time as well) and also recalls a change in bowel habits with intermittent yamilka colored and greasy stools.  MRCP obtained overnight reviewed and is concerning for abrupt cut off in CBD concerning for choledocholithiasis. Past medical, surgical, social, and family histories are reviewed for accuracy.  No documented alleviating or exacerbating factors.  Does not endorse pain at time of exam.    Allergies:  Patient has no known allergies.    Scheduled Meds:  pantoprazole, 40 mg, Oral, Daily  piperacillin-tazobactam, 3.375 g, Intravenous, Q8H  sodium chloride, 10 mL, Intravenous, Q12H       Infusions:  sodium chloride, 100 mL/hr, Last Rate: 100 mL/hr (03/11/22 5113)      PRN Meds:  •  HYDROcodone-acetaminophen  •  HYDROmorphone **AND** naloxone  •  magnesium  sulfate **OR** magnesium sulfate **OR** magnesium sulfate  •  ondansetron  •  potassium chloride **OR** potassium chloride **OR** potassium chloride  •  sodium chloride    Home Meds:  Medications Prior to Admission   Medication Sig Dispense Refill Last Dose   • cetirizine (zyrTEC) 10 MG tablet Take 10 mg by mouth Daily.   Past Week at Unknown time   • estradiol (ESTRACE) 0.5 MG tablet Take 1 tablet by mouth Daily.   3/11/2022 at Unknown time   • levocetirizine (XYZAL) 5 MG tablet Take 5 mg by mouth Every Evening.   3/11/2022 at Unknown time   • losartan-hydrochlorothiazide (HYZAAR) 100-25 MG per tablet TAKE ONE TABLET BY MOUTH DAILY 90 tablet 1 3/11/2022 at Unknown time   • pantoprazole (PROTONIX) 40 MG EC tablet TAKE ONE TABLET BY MOUTH DAILY 90 tablet 1 3/11/2022 at Unknown time   • Triamcinolone Acetonide (NASACORT) 55 MCG/ACT nasal inhaler 2 sprays into the nostril(s) as directed by provider Daily As Needed.          ROS: Review of Systems   Constitutional: Positive for activity change and appetite change. Negative for chills, diaphoresis, fatigue, fever and unexpected weight change.   HENT: Negative for sore throat, trouble swallowing and voice change.    Eyes: Negative.    Respiratory: Negative for apnea, cough, choking, chest tightness, shortness of breath, wheezing and stridor.    Cardiovascular: Negative for chest pain, palpitations and leg swelling.   Gastrointestinal: Positive for abdominal pain, diarrhea, nausea and vomiting. Negative for abdominal distention, anal bleeding, blood in stool, constipation and rectal pain.   Endocrine: Negative.    Genitourinary: Negative.    Musculoskeletal: Negative.    Skin: Negative.    Allergic/Immunologic: Negative.    Neurological: Negative.    Hematological: Negative.    Psychiatric/Behavioral: Negative.    All other systems reviewed and are negative.      PAST MED HX:  Past Medical History:   Diagnosis Date   • GERD (gastroesophageal reflux disease)    •  "Hypertension        PAST SURG HX:  Past Surgical History:   Procedure Laterality Date   • COLONOSCOPY      2011   • HYSTEROSCOPY ENDOMETRIAL ABLATION     • TONSILLECTOMY      As a child       FAM HX:  Family History   Problem Relation Age of Onset   • Diabetes type II Mother    • Hypertension Father    • Breast cancer Neg Hx    • Ovarian cancer Neg Hx        SOC HX:  Social History     Socioeconomic History   • Marital status:    Tobacco Use   • Smoking status: Never Smoker   • Smokeless tobacco: Never Used   Substance and Sexual Activity   • Alcohol use: Yes     Alcohol/week: 7.0 standard drinks     Types: 7 Glasses of wine per week     Comment: 1 glass wine/day   • Drug use: No   • Sexual activity: Yes     Partners: Male     Birth control/protection: Post-menopausal       PHYSICAL EXAM  /71 (BP Location: Left arm, Patient Position: Lying)   Pulse 77   Temp 98.2 °F (36.8 °C) (Oral)   Resp 18   Ht 152.4 cm (60\")   Wt 55 kg (121 lb 3.2 oz)   LMP  (LMP Unknown) Comment: LAST PAP 5/2020 BY DR DUARTE  SpO2 94%   BMI 23.67 kg/m²   Wt Readings from Last 3 Encounters:   03/11/22 55 kg (121 lb 3.2 oz)   11/09/21 55.8 kg (123 lb)   06/01/21 61.2 kg (135 lb)   ,body mass index is 23.67 kg/m².  Physical Exam  Vitals and nursing note reviewed.   Constitutional:       General: She is not in acute distress.     Appearance: Normal appearance. She is normal weight. She is not ill-appearing or toxic-appearing.   HENT:      Head: Normocephalic and atraumatic.      Mouth/Throat:      Mouth: Mucous membranes are moist.      Pharynx: Oropharynx is clear. No oropharyngeal exudate.   Eyes:      General: No scleral icterus.     Extraocular Movements: Extraocular movements intact.      Conjunctiva/sclera: Conjunctivae normal.      Pupils: Pupils are equal, round, and reactive to light.   Cardiovascular:      Rate and Rhythm: Normal rate and regular rhythm.      Pulses: Normal pulses.      Heart sounds: Normal heart " sounds.   Pulmonary:      Effort: Pulmonary effort is normal. No respiratory distress.      Breath sounds: Normal breath sounds.   Abdominal:      General: Abdomen is flat. Bowel sounds are normal. There is no distension.      Palpations: Abdomen is soft. There is no mass.      Tenderness: There is abdominal tenderness. There is no guarding or rebound.      Hernia: No hernia is present.   Genitourinary:     Comments: .  Defer  Musculoskeletal:         General: Normal range of motion.      Cervical back: Normal range of motion and neck supple. No rigidity or tenderness.      Left lower leg: No edema.   Lymphadenopathy:      Cervical: No cervical adenopathy.   Skin:     General: Skin is warm and dry.      Capillary Refill: Capillary refill takes less than 2 seconds.      Coloration: Skin is not jaundiced or pale.   Neurological:      General: No focal deficit present.      Mental Status: She is alert and oriented to person, place, and time.   Psychiatric:         Mood and Affect: Mood normal.         Behavior: Behavior normal.         Thought Content: Thought content normal.         Judgment: Judgment normal.         Results Review:   I reviewed the patient's new clinical results.  I reviewed the patient's new imaging results and agree with the interpretation.  I personally viewed and interpreted the patient's EKG/Telemetry data    Lab Results   Component Value Date    WBC 14.98 (H) 03/11/2022    HGB 14.0 03/11/2022    HGB 14.3 03/11/2022    HGB 13.7 04/06/2021    HCT 40.4 03/11/2022    MCV 90.6 03/11/2022     03/11/2022       No results found for: INR    Lab Results   Component Value Date    GLUCOSE 171 (H) 03/11/2022    BUN 16 03/11/2022    CREATININE 0.68 03/11/2022    EGFRIFNONA 97 11/09/2021    BCR 23.5 03/11/2022     (L) 03/11/2022    K 2.5 (C) 03/11/2022    CO2 27.0 03/11/2022    CALCIUM 9.7 03/11/2022    ALBUMIN 4.20 03/11/2022    ALKPHOS 218 (H) 03/11/2022    BILITOT 3.3 (H) 03/11/2022      (H) 03/11/2022     (H) 03/11/2022       US Gallbladder    Result Date: 3/11/2022  DATE OF EXAM: 3/11/2022 6:19 PM  PROCEDURE: US GALLBLADDER-  INDICATIONS: three days RUQ pain, history of gallstones  COMPARISON: Abdominal ultrasound 2/28/2020  TECHNIQUE: Transverse and sagittal grayscale sonographic assessment of the right upper quadrant supplemented with color and spectral Doppler.  FINDINGS: The visualized portions of the pancreas appear normal.  Mild increased echogenicity of the liver parenchyma suggestive of some hepatic steatosis. No focal hepatic lesion. No intrahepatic ductal dilatation. The portal vein is patent with hepatopetal flow. The right hepatic vein is patent.  There is some dependent sludge within the gallbladder. A few mobile dependent echogenic and shadowing gallstones are also noted within the sludge. Moderate diffuse wall thickening measuring up to 6 mm. No pericholecystic fluid. Negative sonographic Pro sign.  Mild prominence of the common bile duct measuring 9 mm in diameter.  Normal appearance of the right kidney.       Gallbladder sludge and mobile shadowing stones with moderate diffuse thickening of the gallbladder wall, without pericholecystic fluid. In the setting of leukocytosis and reported right upper quadrant pain, the findings are suspicious for early acute cholecystitis.  This report was finalized on 3/11/2022 7:23 PM by Vidal Puga MD.      MRI abdomen wo contrast mrcp    Result Date: 3/11/2022  DATE OF EXAM: 3/11/2022 8:49 PM  PROCEDURE: MRI ABDOMEN WO CONTRAST MRCP-  INDICATIONS: cholecystatic pattern; K81.0-Acute cholecystitis; K80.00-Calculus of gallbladder with acute cholecystitis without obstruction; E87.6-Hypokalemia  COMPARISON: Gallbladder ultrasound 3/11/2022  TECHNIQUE: Multiplanar/multisequence MRI imaging of the abdomen was performed without gadolinium contrast administration.  FINDINGS: Distended gallbladder with dependent sludge and a few dependent  subcentimeter gallstones. No definite evidence of stone within the gallbladder neck or cystic duct. There is questionable pericholecystic edema or fluid (series 2 image 23), although a component of this appearance could reflect respiratory motion artifact. No evidence of choledocholithiasis. Mild diffuse prominence of the common bile duct measuring up to 9 mm in diameter. Mild central intrahepatic ductal prominence.  Scattered subcentimeter T2 hyperintensities in the liver likely reflect tiny hepatic cysts or biliary hamartomas.  Normal appearance of the pancreas. Normal appearance of the main pancreatic duct without pancreatic ductal dilatation.  The lung bases appear grossly clear.  Unremarkable appearance of the spleen.  Unremarkable appearance of the kidneys.  The imaged portions of the GI tract appear unremarkable.      Distended gallbladder with dependent sludge and a few subcentimeter stones within the gallbladder lumen. There is questionable pericholecystic edema or fluid although a component of this appearance could reflect respiratory motion artifact. Overall findings remain concerning for acute cholecystitis. A distinct stone is not identified within the gallbladder neck or cystic duct, however.  Diffuse prominence of the common bile duct measuring up to 9 mm in diameter, with mild central intrahepatic ductal prominence, without definite evidence of choledocholithiasis.  This report was finalized on 3/11/2022 10:27 PM by Vidal Puga MD.        COVID19   Date Value Ref Range Status   03/11/2022 Presumptive Negative Presumptive Negative - Ref. Range Final     ASSESSMENTS/PLANS  1.  Acute calculus cholecystitis with choledocholithiasis  2.  Cholestatic, obstructive LFTs, hyperbilirubinemia  3.  Hypokalemia, potassium currently 2.5 receiving supplement per protocol  4.  Essential hypertension  5.  History of GERD.    Helga Cardenas is a 66 y.o. female admitted to hospital with 4-day onset of  worsening right upper quadrant abdominal pain with associated nausea and vomiting.  Ultrasound shows evidence of acute calculus cholecystitis and sludge; MRCP obtained overnight reviewed and shows abrupt cut off concerning for choledocholithiasis; LFTs also worsened overnight.  Based on imaging and laboratory findings, recommend ERCP today with planned LCCY tomorrow.    >>> Continue n.p.o.  >>> Obtain informed consent for endoscopic retrograde cholangiopancreatography   -Orders placed for fluro and indomethicin   -adequate antibiotic coverage with Zosyn  >>> Risk and benefits explained including incomplete cannulation, 10%; Perforation, 1/ 500; Bleeding, 1/500:  Infection; Pancreatitis, 5 to 10% mild to moderate; and 5% severe with 1/1000 risk of death.  >>> Continue antiemetics and pain control measures  >>> Continue to replace potassium per protocol; last run currently going in-will draw a repeat K at 1300 and proceed with ERCP if WNL.     I discussed the patient's findings and my recommendations with patient, family and nursing staff.    Stevenson Anaya, VINNIE  03/12/22  10:29 EST

## 2022-03-12 NOTE — BRIEF OP NOTE
ENDOSCOPIC RETROGRADE CHOLANGIOPANCREATOGRAPHY  Progress Note    Helga Molina Ronald  3/12/2022    Single-pass wire cannulation of the common bile duct.  Cholangiogram reveals common bile duct dilation to 11 mm.  There is a filling defect in the distal common bile duct.  Sphincterotomy performed.  Balloon sweeps yielded multiple small yellow stones.  Bile drained well and spontaneously at conclusion of procedure.    >> Proceed with cholecystectomy.    Mark I. Brunner, MD     Date: 3/12/2022  Time: 15:28 EST

## 2022-03-13 ENCOUNTER — ANESTHESIA (OUTPATIENT)
Dept: PERIOP | Facility: HOSPITAL | Age: 67
End: 2022-03-13

## 2022-03-13 ENCOUNTER — APPOINTMENT (OUTPATIENT)
Dept: GENERAL RADIOLOGY | Facility: HOSPITAL | Age: 67
End: 2022-03-13

## 2022-03-13 ENCOUNTER — ANESTHESIA EVENT (OUTPATIENT)
Dept: PERIOP | Facility: HOSPITAL | Age: 67
End: 2022-03-13

## 2022-03-13 LAB
ALBUMIN SERPL-MCNC: 3.1 G/DL (ref 3.5–5.2)
ALBUMIN/GLOB SERPL: 1.1 G/DL
ALP SERPL-CCNC: 239 U/L (ref 39–117)
ALT SERPL W P-5'-P-CCNC: 166 U/L (ref 1–33)
ANION GAP SERPL CALCULATED.3IONS-SCNC: 10 MMOL/L (ref 5–15)
AST SERPL-CCNC: 159 U/L (ref 1–32)
BILIRUB SERPL-MCNC: 0.6 MG/DL (ref 0–1.2)
BUN SERPL-MCNC: 10 MG/DL (ref 8–23)
BUN/CREAT SERPL: 19.6 (ref 7–25)
CALCIUM SPEC-SCNC: 8.5 MG/DL (ref 8.6–10.5)
CHLORIDE SERPL-SCNC: 99 MMOL/L (ref 98–107)
CO2 SERPL-SCNC: 25 MMOL/L (ref 22–29)
CREAT SERPL-MCNC: 0.51 MG/DL (ref 0.57–1)
DEPRECATED RDW RBC AUTO: 41.1 FL (ref 37–54)
EGFRCR SERPLBLD CKD-EPI 2021: 103.7 ML/MIN/1.73
ERYTHROCYTE [DISTWIDTH] IN BLOOD BY AUTOMATED COUNT: 12.4 % (ref 12.3–15.4)
GLOBULIN UR ELPH-MCNC: 2.9 GM/DL
GLUCOSE SERPL-MCNC: 118 MG/DL (ref 65–99)
HCT VFR BLD AUTO: 34 % (ref 34–46.6)
HGB BLD-MCNC: 11.9 G/DL (ref 12–15.9)
MAGNESIUM SERPL-MCNC: 2.4 MG/DL (ref 1.6–2.4)
MCH RBC QN AUTO: 31.6 PG (ref 26.6–33)
MCHC RBC AUTO-ENTMCNC: 35 G/DL (ref 31.5–35.7)
MCV RBC AUTO: 90.2 FL (ref 79–97)
PLATELET # BLD AUTO: 256 10*3/MM3 (ref 140–450)
PMV BLD AUTO: 9.2 FL (ref 6–12)
POTASSIUM SERPL-SCNC: 3.3 MMOL/L (ref 3.5–5.2)
PROT SERPL-MCNC: 6 G/DL (ref 6–8.5)
RBC # BLD AUTO: 3.77 10*6/MM3 (ref 3.77–5.28)
SODIUM SERPL-SCNC: 134 MMOL/L (ref 136–145)
WBC NRBC COR # BLD: 11.15 10*3/MM3 (ref 3.4–10.8)

## 2022-03-13 PROCEDURE — 99232 SBSQ HOSP IP/OBS MODERATE 35: CPT | Performed by: NURSE PRACTITIONER

## 2022-03-13 PROCEDURE — G0378 HOSPITAL OBSERVATION PER HR: HCPCS

## 2022-03-13 PROCEDURE — C1889 IMPLANT/INSERT DEVICE, NOC: HCPCS | Performed by: SURGERY

## 2022-03-13 PROCEDURE — 99225 PR SBSQ OBSERVATION CARE/DAY 25 MINUTES: CPT | Performed by: INTERNAL MEDICINE

## 2022-03-13 PROCEDURE — 25010000002 ONDANSETRON PER 1 MG: Performed by: SURGERY

## 2022-03-13 PROCEDURE — 25010000002 PROPOFOL 10 MG/ML EMULSION: Performed by: NURSE ANESTHETIST, CERTIFIED REGISTERED

## 2022-03-13 PROCEDURE — 25010000002 PIPERACILLIN SOD-TAZOBACTAM PER 1 G: Performed by: SURGERY

## 2022-03-13 PROCEDURE — 87075 CULTR BACTERIA EXCEPT BLOOD: CPT | Performed by: SURGERY

## 2022-03-13 PROCEDURE — 87176 TISSUE HOMOGENIZATION CULTR: CPT | Performed by: SURGERY

## 2022-03-13 PROCEDURE — 87070 CULTURE OTHR SPECIMN AEROBIC: CPT | Performed by: SURGERY

## 2022-03-13 PROCEDURE — 25010000002 FENTANYL CITRATE (PF) 50 MCG/ML SOLUTION: Performed by: NURSE ANESTHETIST, CERTIFIED REGISTERED

## 2022-03-13 PROCEDURE — 87076 CULTURE ANAEROBE IDENT EACH: CPT | Performed by: SURGERY

## 2022-03-13 PROCEDURE — 87077 CULTURE AEROBIC IDENTIFY: CPT | Performed by: SURGERY

## 2022-03-13 PROCEDURE — 85027 COMPLETE CBC AUTOMATED: CPT | Performed by: SURGERY

## 2022-03-13 PROCEDURE — 25010000002 HYDROMORPHONE PER 4 MG: Performed by: NURSE ANESTHETIST, CERTIFIED REGISTERED

## 2022-03-13 PROCEDURE — 25010000002 DEXAMETHASONE PER 1 MG: Performed by: NURSE ANESTHETIST, CERTIFIED REGISTERED

## 2022-03-13 PROCEDURE — 25010000002 ONDANSETRON PER 1 MG: Performed by: INTERNAL MEDICINE

## 2022-03-13 PROCEDURE — 83735 ASSAY OF MAGNESIUM: CPT | Performed by: INTERNAL MEDICINE

## 2022-03-13 PROCEDURE — 25010000002 PIPERACILLIN SOD-TAZOBACTAM PER 1 G: Performed by: INTERNAL MEDICINE

## 2022-03-13 PROCEDURE — 80053 COMPREHEN METABOLIC PANEL: CPT | Performed by: SURGERY

## 2022-03-13 PROCEDURE — 87205 SMEAR GRAM STAIN: CPT | Performed by: SURGERY

## 2022-03-13 PROCEDURE — 87186 SC STD MICRODIL/AGAR DIL: CPT | Performed by: SURGERY

## 2022-03-13 PROCEDURE — 88304 TISSUE EXAM BY PATHOLOGIST: CPT | Performed by: SURGERY

## 2022-03-13 DEVICE — LIGAMAX 5 MM ENDOSCOPIC MULTIPLE CLIP APPLIER
Type: IMPLANTABLE DEVICE | Site: ABDOMEN | Status: FUNCTIONAL
Brand: LIGAMAX

## 2022-03-13 RX ORDER — BUPIVACAINE HYDROCHLORIDE AND EPINEPHRINE 2.5; 5 MG/ML; UG/ML
INJECTION, SOLUTION EPIDURAL; INFILTRATION; INTRACAUDAL; PERINEURAL AS NEEDED
Status: DISCONTINUED | OUTPATIENT
Start: 2022-03-13 | End: 2022-03-13 | Stop reason: HOSPADM

## 2022-03-13 RX ORDER — DEXAMETHASONE SODIUM PHOSPHATE 4 MG/ML
INJECTION, SOLUTION INTRA-ARTICULAR; INTRALESIONAL; INTRAMUSCULAR; INTRAVENOUS; SOFT TISSUE AS NEEDED
Status: DISCONTINUED | OUTPATIENT
Start: 2022-03-13 | End: 2022-03-13 | Stop reason: SURG

## 2022-03-13 RX ORDER — OXYCODONE HYDROCHLORIDE AND ACETAMINOPHEN 5; 325 MG/1; MG/1
2 TABLET ORAL EVERY 4 HOURS PRN
Status: DISCONTINUED | OUTPATIENT
Start: 2022-03-13 | End: 2022-03-14 | Stop reason: HOSPADM

## 2022-03-13 RX ORDER — MEPERIDINE HYDROCHLORIDE 25 MG/ML
12.5 INJECTION INTRAMUSCULAR; INTRAVENOUS; SUBCUTANEOUS
Status: DISCONTINUED | OUTPATIENT
Start: 2022-03-13 | End: 2022-03-13 | Stop reason: HOSPADM

## 2022-03-13 RX ORDER — PROMETHAZINE HYDROCHLORIDE 25 MG/1
25 SUPPOSITORY RECTAL ONCE AS NEEDED
Status: DISCONTINUED | OUTPATIENT
Start: 2022-03-13 | End: 2022-03-13 | Stop reason: HOSPADM

## 2022-03-13 RX ORDER — NALOXONE HCL 0.4 MG/ML
0.1 VIAL (ML) INJECTION
Status: DISCONTINUED | OUTPATIENT
Start: 2022-03-13 | End: 2022-03-14 | Stop reason: SDUPTHER

## 2022-03-13 RX ORDER — NALOXONE HCL 0.4 MG/ML
0.4 VIAL (ML) INJECTION
Status: DISCONTINUED | OUTPATIENT
Start: 2022-03-13 | End: 2022-03-14 | Stop reason: HOSPADM

## 2022-03-13 RX ORDER — ONDANSETRON 4 MG/1
4 TABLET, FILM COATED ORAL EVERY 6 HOURS PRN
Status: DISCONTINUED | OUTPATIENT
Start: 2022-03-13 | End: 2022-03-14 | Stop reason: SDUPTHER

## 2022-03-13 RX ORDER — SODIUM CHLORIDE 9 MG/ML
INJECTION, SOLUTION INTRAVENOUS AS NEEDED
Status: DISCONTINUED | OUTPATIENT
Start: 2022-03-13 | End: 2022-03-13 | Stop reason: HOSPADM

## 2022-03-13 RX ORDER — PROMETHAZINE HYDROCHLORIDE 25 MG/1
25 TABLET ORAL ONCE AS NEEDED
Status: DISCONTINUED | OUTPATIENT
Start: 2022-03-13 | End: 2022-03-13 | Stop reason: HOSPADM

## 2022-03-13 RX ORDER — MAGNESIUM HYDROXIDE 1200 MG/15ML
LIQUID ORAL AS NEEDED
Status: DISCONTINUED | OUTPATIENT
Start: 2022-03-13 | End: 2022-03-13 | Stop reason: HOSPADM

## 2022-03-13 RX ORDER — SODIUM CHLORIDE, SODIUM LACTATE, POTASSIUM CHLORIDE, CALCIUM CHLORIDE 600; 310; 30; 20 MG/100ML; MG/100ML; MG/100ML; MG/100ML
125 INJECTION, SOLUTION INTRAVENOUS CONTINUOUS
Status: DISCONTINUED | OUTPATIENT
Start: 2022-03-13 | End: 2022-03-13

## 2022-03-13 RX ORDER — ROCURONIUM BROMIDE 10 MG/ML
INJECTION, SOLUTION INTRAVENOUS AS NEEDED
Status: DISCONTINUED | OUTPATIENT
Start: 2022-03-13 | End: 2022-03-13 | Stop reason: SURG

## 2022-03-13 RX ORDER — FENTANYL CITRATE 50 UG/ML
50 INJECTION, SOLUTION INTRAMUSCULAR; INTRAVENOUS
Status: DISCONTINUED | OUTPATIENT
Start: 2022-03-13 | End: 2022-03-13 | Stop reason: HOSPADM

## 2022-03-13 RX ORDER — FENTANYL CITRATE 50 UG/ML
INJECTION, SOLUTION INTRAMUSCULAR; INTRAVENOUS AS NEEDED
Status: DISCONTINUED | OUTPATIENT
Start: 2022-03-13 | End: 2022-03-13 | Stop reason: SURG

## 2022-03-13 RX ORDER — DOCUSATE SODIUM 100 MG/1
100 CAPSULE, LIQUID FILLED ORAL 2 TIMES DAILY PRN
Status: DISCONTINUED | OUTPATIENT
Start: 2022-03-13 | End: 2022-03-14 | Stop reason: HOSPADM

## 2022-03-13 RX ORDER — LABETALOL HYDROCHLORIDE 5 MG/ML
INJECTION, SOLUTION INTRAVENOUS AS NEEDED
Status: DISCONTINUED | OUTPATIENT
Start: 2022-03-13 | End: 2022-03-13 | Stop reason: SURG

## 2022-03-13 RX ORDER — HYDROMORPHONE HYDROCHLORIDE 1 MG/ML
0.5 INJECTION, SOLUTION INTRAMUSCULAR; INTRAVENOUS; SUBCUTANEOUS
Status: DISCONTINUED | OUTPATIENT
Start: 2022-03-13 | End: 2022-03-13 | Stop reason: HOSPADM

## 2022-03-13 RX ORDER — HEPARIN SODIUM 5000 [USP'U]/ML
5000 INJECTION, SOLUTION INTRAVENOUS; SUBCUTANEOUS EVERY 8 HOURS SCHEDULED
Status: DISCONTINUED | OUTPATIENT
Start: 2022-03-14 | End: 2022-03-14 | Stop reason: HOSPADM

## 2022-03-13 RX ORDER — SIMETHICONE 80 MG
80 TABLET,CHEWABLE ORAL 4 TIMES DAILY PRN
Status: DISCONTINUED | OUTPATIENT
Start: 2022-03-13 | End: 2022-03-14 | Stop reason: HOSPADM

## 2022-03-13 RX ORDER — PROPOFOL 10 MG/ML
VIAL (ML) INTRAVENOUS AS NEEDED
Status: DISCONTINUED | OUTPATIENT
Start: 2022-03-13 | End: 2022-03-13 | Stop reason: SURG

## 2022-03-13 RX ORDER — HYDROMORPHONE HYDROCHLORIDE 1 MG/ML
0.5 INJECTION, SOLUTION INTRAMUSCULAR; INTRAVENOUS; SUBCUTANEOUS
Status: DISCONTINUED | OUTPATIENT
Start: 2022-03-13 | End: 2022-03-14 | Stop reason: SDUPTHER

## 2022-03-13 RX ORDER — MORPHINE SULFATE 2 MG/ML
2 INJECTION, SOLUTION INTRAMUSCULAR; INTRAVENOUS
Status: DISCONTINUED | OUTPATIENT
Start: 2022-03-13 | End: 2022-03-14 | Stop reason: HOSPADM

## 2022-03-13 RX ORDER — ONDANSETRON 2 MG/ML
4 INJECTION INTRAMUSCULAR; INTRAVENOUS EVERY 6 HOURS PRN
Status: DISCONTINUED | OUTPATIENT
Start: 2022-03-13 | End: 2022-03-14 | Stop reason: SDUPTHER

## 2022-03-13 RX ADMIN — ACETAMINOPHEN 650 MG: 325 TABLET ORAL at 12:22

## 2022-03-13 RX ADMIN — Medication 10 ML: at 20:52

## 2022-03-13 RX ADMIN — LABETALOL 20 MG/4 ML (5 MG/ML) INTRAVENOUS SYRINGE 10 MG: at 08:35

## 2022-03-13 RX ADMIN — FENTANYL CITRATE 100 MCG: 50 INJECTION, SOLUTION INTRAMUSCULAR; INTRAVENOUS at 07:57

## 2022-03-13 RX ADMIN — SIMETHICONE 80 MG: 80 TABLET, CHEWABLE ORAL at 20:51

## 2022-03-13 RX ADMIN — POTASSIUM CHLORIDE 40 MEQ: 750 CAPSULE, EXTENDED RELEASE ORAL at 12:22

## 2022-03-13 RX ADMIN — ACETAMINOPHEN 650 MG: 325 TABLET ORAL at 21:14

## 2022-03-13 RX ADMIN — ONDANSETRON 4 MG: 2 INJECTION INTRAMUSCULAR; INTRAVENOUS at 07:59

## 2022-03-13 RX ADMIN — SUGAMMADEX 200 MG: 100 INJECTION, SOLUTION INTRAVENOUS at 09:04

## 2022-03-13 RX ADMIN — TAZOBACTAM SODIUM AND PIPERACILLIN SODIUM 3.38 G: 375; 3 INJECTION, SOLUTION INTRAVENOUS at 12:00

## 2022-03-13 RX ADMIN — LABETALOL 20 MG/4 ML (5 MG/ML) INTRAVENOUS SYRINGE 10 MG: at 08:25

## 2022-03-13 RX ADMIN — OXYCODONE HYDROCHLORIDE AND ACETAMINOPHEN 2 TABLET: 5; 325 TABLET ORAL at 16:16

## 2022-03-13 RX ADMIN — PANTOPRAZOLE SODIUM 40 MG: 40 TABLET, DELAYED RELEASE ORAL at 05:50

## 2022-03-13 RX ADMIN — TAZOBACTAM SODIUM AND PIPERACILLIN SODIUM 3.38 G: 375; 3 INJECTION, SOLUTION INTRAVENOUS at 05:50

## 2022-03-13 RX ADMIN — SIMETHICONE 80 MG: 80 TABLET, CHEWABLE ORAL at 16:16

## 2022-03-13 RX ADMIN — ROCURONIUM BROMIDE 40 MG: 10 INJECTION INTRAVENOUS at 07:57

## 2022-03-13 RX ADMIN — POTASSIUM CHLORIDE 40 MEQ: 750 CAPSULE, EXTENDED RELEASE ORAL at 18:03

## 2022-03-13 RX ADMIN — SODIUM CHLORIDE 100 ML/HR: 9 INJECTION, SOLUTION INTRAVENOUS at 21:14

## 2022-03-13 RX ADMIN — TAZOBACTAM SODIUM AND PIPERACILLIN SODIUM 3.38 G: 375; 3 INJECTION, SOLUTION INTRAVENOUS at 20:51

## 2022-03-13 RX ADMIN — PROPOFOL 150 MG: 10 INJECTION, EMULSION INTRAVENOUS at 07:57

## 2022-03-13 RX ADMIN — FENTANYL CITRATE 50 MCG: 0.05 INJECTION, SOLUTION INTRAMUSCULAR; INTRAVENOUS at 09:30

## 2022-03-13 RX ADMIN — DEXAMETHASONE SODIUM PHOSPHATE 8 MG: 4 INJECTION INTRA-ARTICULAR; INTRALESIONAL; INTRAMUSCULAR; INTRAVENOUS; SOFT TISSUE at 07:59

## 2022-03-13 RX ADMIN — HYDROMORPHONE HYDROCHLORIDE 0.5 MG: 1 INJECTION, SOLUTION INTRAMUSCULAR; INTRAVENOUS; SUBCUTANEOUS at 09:30

## 2022-03-13 NOTE — PROGRESS NOTES
[facility] Medicine Services  PROGRESS NOTE    Patient Name: Helga Cardenas  : 1955  MRN: [id number]    Date of Admission: 3/11/2022  Primary Care Physician: Rey Desouza MD    Subjective   Subjective     CC:  F/U abdominal pain    HPI:  Doing OK today after cholecystectomy.    ROS:  Gen- No fevers, chills  GI- Improved pain, no vomiting    Objective   Objective     Vital Signs:   Temp:  [97.5 °F (36.4 °C)-98.8 °F (37.1 °C)] 98.8 °F (37.1 °C)  Heart Rate:  [53-85] 67  Resp:  [16-18] 18  BP: (130-160)/(52-83) 151/83  Flow (L/min):  [2] 2  FiO2 (%):  [29 %-79 %] 29 %     Physical Exam:  Constitutional: No acute distress, awake, alert, laying in bed  HENT: NCAT, mucous membranes moist  Respiratory: Clear to auscultation bilaterally, respiratory effort normal   Cardiovascular: RRR, no murmurs, rubs, or gallops  Gastrointestinal: Soft, nondistended  Musculoskeletal: No bilateral ankle edema  Psychiatric: Appropriate affect, cooperative  Neurologic: Cranial Nerves grossly intact to confrontation, speech clear  Skin: No rashes on exposed surfaces    Results Reviewed:  LAB RESULTS:      Lab 22  0606 22  1004 22  1726   WBC 11.15* 15.57* 14.98* 16.21*   HEMOGLOBIN 11.9* 12.0 14.0 14.3   HEMATOCRIT 34.0 36.5 40.4 41.9   PLATELETS 256 255 293 323   NEUTROS ABS  --  13.68*  --  15.35*   IMMATURE GRANS (ABS)  --  0.10*  --  0.09*   LYMPHS ABS  --  0.69*  --  0.47*   MONOS ABS  --  1.01*  --  0.24   EOS ABS  --  0.05  --  0.04   MCV 90.2 94.8 90.6 90.9   LACTATE  --   --  1.2  --          Lab 22  0606 22  1350 22  1004 22  1836   SODIUM 134*  --  134* 132* 133*   POTASSIUM 3.3* 3.7 3.4* 2.5* 2.8*   CHLORIDE 99  --  98 91* 93*   CO2 25.0  --  27.0 27.0 28.0   ANION GAP 10.0  --  9.0 14.0 12.0   BUN 10  --  12 16 14   CREATININE 0.51*  --  0.54* 0.68 0.64   EGFR 103.7  --  102.3 96.8 98.2   GLUCOSE 118*  --   99 171* 149*   CALCIUM 8.5*  --  8.7 9.7 9.8   MAGNESIUM 2.4  --  1.7 1.6 1.7         Lab 03/13/22  0606 03/12/22  1004 03/11/22  2233 03/11/22  1836   TOTAL PROTEIN 6.0 6.1 7.3 7.6   ALBUMIN 3.10* 3.50 4.20 4.40   GLOBULIN 2.9 2.6 3.1 3.2   ALT (SGPT) 166* 114* 133* 35*   AST (SGOT) 159* 122* 340* 68*   BILIRUBIN 0.6 1.0 3.3* 1.4*   ALK PHOS 239* 166* 218* 164*   LIPASE  --   --   --  14                     Brief Urine Lab Results  (Last result in the past 365 days)      Color   Clarity   Blood   Leuk Est   Nitrite   Protein   CREAT   Urine HCG        03/11/22 1732 Yellow   Clear   Negative   Negative   Negative   Negative                 Microbiology Results Abnormal     Procedure Component Value - Date/Time    Blood Culture - Blood, Arm, Left [[id number]]  (Normal) Collected: 03/11/22 2229    Lab Status: Preliminary result Specimen: Blood from Arm, Left Updated: 03/13/22 0402     Blood Culture No growth at 24 hours    Blood Culture - Blood, Hand, Left [[id number]]  (Normal) Collected: 03/11/22 2231    Lab Status: Preliminary result Specimen: Blood from Hand, Left Updated: 03/13/22 0402     Blood Culture No growth at 24 hours    COVID PRE-OP / PRE-PROCEDURE SCREENING ORDER (NO ISOLATION) - Swab, Nasopharynx [[id number]]  (Normal) Collected: 03/11/22 2018    Lab Status: Final result Specimen: Swab from Nasopharynx Updated: 03/11/22 2047    Narrative:      The following orders were created for panel order COVID PRE-OP / PRE-PROCEDURE SCREENING ORDER (NO ISOLATION) - Swab, Nasopharynx.  Procedure                               Abnormality         Status                     ---------                               -----------         ------                     COVID-19, ABBOTT IN-HOUS...[[id number]]  Normal              Final result                 Please view results for these tests on the individual orders.    COVID-19, ABBOTT IN-HOUSE,NASAL Swab (NO TRANSPORT MEDIA) 2 HR TAT - Swab, Nasopharynx [[id number]]  (Normal)  Collected: 03/11/22 2018    Lab Status: Final result Specimen: Swab from Nasopharynx Updated: 03/11/22 2047     COVID19 Presumptive Negative    Narrative:      Fact sheet for providers: https://www.fda.gov/media/327709/download     Fact sheet for patients: https://www.fda.gov/media/446200/download    Test performed by PCR.  If inconsistent with clinical signs and symptoms patient should be tested with different authorized molecular test.          FL ERCP pancreatic and biliary ducts    Result Date: 3/12/2022  DATE OF EXAM: 3/12/2022 3:33 PM  PROCEDURE: FL ERCP PANCREATIC AND BILIARY DUCTS-  INDICATIONS: pain; K81.0-Acute cholecystitis; K80.00-Calculus of gallbladder with acute cholecystitis without obstruction; E87.6-Hypokalemia  COMPARISON: No comparisons available.  TECHNIQUE: A series of radiographic digital spot films were obtained in conjunction with a endoscopic catheterization of the biliary and pancreatic ductal system, performed by the gastroenterologist.  Fluoroscopic time: 45 seconds  Number of Images: 4  FINDINGS: Endoscope and side cannula is seen with apparent contrast injection and balloon sweep. Please see the procedure report for full details.      Impression: Fluoroscopy provided during ERCP.       US Gallbladder    Result Date: 3/11/2022  DATE OF EXAM: 3/11/2022 6:19 PM  PROCEDURE: US GALLBLADDER-  INDICATIONS: three days RUQ pain, history of gallstones  COMPARISON: Abdominal ultrasound 2/28/2020  TECHNIQUE: Transverse and sagittal grayscale sonographic assessment of the right upper quadrant supplemented with color and spectral Doppler.  FINDINGS: The visualized portions of the pancreas appear normal.  Mild increased echogenicity of the liver parenchyma suggestive of some hepatic steatosis. No focal hepatic lesion. No intrahepatic ductal dilatation. The portal vein is patent with hepatopetal flow. The right hepatic vein is patent.  There is some dependent sludge within the gallbladder. A few  mobile dependent echogenic and shadowing gallstones are also noted within the sludge. Moderate diffuse wall thickening measuring up to 6 mm. No pericholecystic fluid. Negative sonographic Pro sign.  Mild prominence of the common bile duct measuring 9 mm in diameter.  Normal appearance of the right kidney.       Impression: Gallbladder sludge and mobile shadowing stones with moderate diffuse thickening of the gallbladder wall, without pericholecystic fluid. In the setting of leukocytosis and reported right upper quadrant pain, the findings are suspicious for early acute cholecystitis.  This report was finalized on 3/11/2022 7:23 PM by Vidal Puga MD.      MRI abdomen wo contrast mrcp    Result Date: 3/11/2022  DATE OF EXAM: 3/11/2022 8:49 PM  PROCEDURE: MRI ABDOMEN WO CONTRAST MRCP-  INDICATIONS: cholecystatic pattern; K81.0-Acute cholecystitis; K80.00-Calculus of gallbladder with acute cholecystitis without obstruction; E87.6-Hypokalemia  COMPARISON: Gallbladder ultrasound 3/11/2022  TECHNIQUE: Multiplanar/multisequence MRI imaging of the abdomen was performed without gadolinium contrast administration.  FINDINGS: Distended gallbladder with dependent sludge and a few dependent subcentimeter gallstones. No definite evidence of stone within the gallbladder neck or cystic duct. There is questionable pericholecystic edema or fluid (series 2 image 23), although a component of this appearance could reflect respiratory motion artifact. No evidence of choledocholithiasis. Mild diffuse prominence of the common bile duct measuring up to 9 mm in diameter. Mild central intrahepatic ductal prominence.  Scattered subcentimeter T2 hyperintensities in the liver likely reflect tiny hepatic cysts or biliary hamartomas.  Normal appearance of the pancreas. Normal appearance of the main pancreatic duct without pancreatic ductal dilatation.  The lung bases appear grossly clear.  Unremarkable appearance of the spleen.  Unremarkable  appearance of the kidneys.  The imaged portions of the GI tract appear unremarkable.      Impression: Distended gallbladder with dependent sludge and a few subcentimeter stones within the gallbladder lumen. There is questionable pericholecystic edema or fluid although a component of this appearance could reflect respiratory motion artifact. Overall findings remain concerning for acute cholecystitis. A distinct stone is not identified within the gallbladder neck or cystic duct, however.  Diffuse prominence of the common bile duct measuring up to 9 mm in diameter, with mild central intrahepatic ductal prominence, without definite evidence of choledocholithiasis.  This report was finalized on 3/11/2022 10:27 PM by Vidal Puga MD.            I have reviewed the medications:  Scheduled Meds:pantoprazole, 40 mg, Oral, Daily  piperacillin-tazobactam, 3.375 g, Intravenous, Q8H  sodium chloride, 10 mL, Intravenous, Q12H      Continuous Infusions:lactated ringers, 30 mL/hr  lactated ringers, 9 mL/hr, Last Rate: Stopped (03/12/22 2028)  sodium chloride, 100 mL/hr, Last Rate: 100 mL/hr (03/12/22 1054)      PRN Meds:.•  acetaminophen **OR** acetaminophen **OR** acetaminophen  •  bupivacaine-EPINEPHrine PF  •  HYDROcodone-acetaminophen  •  HYDROmorphone **AND** naloxone  •  iopamidol  •  lactated ringers  •  magnesium sulfate **OR** magnesium sulfate **OR** magnesium sulfate  •  ondansetron  •  potassium chloride **OR** potassium chloride **OR** potassium chloride  •  sodium chloride  •  sodium chloride  •  sterile water    Assessment/Plan   Assessment & Plan     Active Hospital Problems    Diagnosis  POA   • **Cholecystitis [K81.9]  Yes   • Acute cholecystitis [K81.0]  Yes   • GERD (gastroesophageal reflux disease) [K21.9]  Yes   • Calculus of gallbladder with acute cholecystitis without obstruction [K80.00]  Unknown   • Essential hypertension [I10]  Yes      Resolved Hospital Problems   No resolved problems to display.         Brief Hospital Course to date:  Helga Cardenas is a 66 y.o. female with GERD and HTN who presented with abdominal pain and workup concerning for acute cholecystitis with possible choledocholithiasis.     This patient's problems and plans were partially entered by my partner and updated as appropriate by me 03/13/22.    Acute cholecystitis, possible choledocholithiasis   -S/P ERCP with multiple stones removed  -S/P cholecystectomy today  -Continue pain and nausea control     Hypertension  -Losartan-HCTZ on hold, resume as needed     Hypokalemia  -Continue replacement as needed    DVT prophylaxis:  Mechanical DVT prophylaxis orders are present.       AM-PAC 6 Clicks Score (PT): 24 (03/12/22 0800)    Disposition: I expect the patient to be discharged home tomorrow if doing well.    CODE STATUS:   Code Status and Medical Interventions:   Ordered at: 03/11/22 2200     Code Status (Patient has no pulse and is not breathing):    CPR (Attempt to Resuscitate)     Medical Interventions (Patient has pulse or is breathing):    Full Support       Alicia Lemus MD  03/13/22

## 2022-03-13 NOTE — ANESTHESIA PROCEDURE NOTES
Airway  Urgency: elective    Date/Time: 3/13/2022 8:03 AM  Airway not difficult    General Information and Staff    Patient location during procedure: OR  CRNA: Shiloh Cash CRNA    Indications and Patient Condition  Indications for airway management: airway protection    Preoxygenated: yes  MILS not maintained throughout  Mask difficulty assessment: 1 - vent by mask    Final Airway Details  Final airway type: endotracheal airway      Successful airway: ETT  Cuffed: yes   Successful intubation technique: direct laryngoscopy  Endotracheal tube insertion site: oral  Blade: Jerald  Blade size: 3  ETT size (mm): 7.0  Cormack-Lehane Classification: grade I - full view of glottis  Placement verified by: chest auscultation and capnometry   Measured from: lips  ETT/EBT  to lips (cm): 20  Number of attempts at approach: 1  Assessment: lips, teeth, and gum same as pre-op and atraumatic intubation    Additional Comments  Negative epigastric sounds, Breath sound equal bilaterally with symmetric chest rise and fall

## 2022-03-13 NOTE — PLAN OF CARE
Goal Outcome Evaluation:  Plan of Care Reviewed With: patient        Progress: improving  Outcome Evaluation: VSS on room air, complaints of headache, po prn administered with reported relief, ambulating in halls, NPO for ERCP today

## 2022-03-13 NOTE — OP NOTE
Operative Report    Patient Name:  Helga Ramirezwhite  YOB: 1955  [id number]  3/13/2022      PREOPERATIVE DIAGNOSIS: Acute cholecystitis, choledocholithiasis       POSTOPERATIVE DIAGNOSIS: Same        PROCEDURE PERFORMED:     Laparoscopic cholecystectomy        SURGEON: Harshad Hdz MD      ASSISTANT: None        SPECIMENS: Gallbladder and contents       ESTIMATED BLOOD LOSS: 10 mL       ANESTHESIA: General.        FINDINGS:     1. Critical view of safety established prior to ligation of cystic structures   2. The gallbladder was distended and had some wall edema. We aspirated the gallbladder in order to be able to manipulate it and this fluid was passed off for culture        INDICATIONS:      The patient is a 66 y.o. female with a history of abdominal pain and a clinical diagnosis consistent with acute cholecystitis and concern for choledocholithiasis. She underwent ERCP yesterday with common bile duct clearance.. Pre-operative imaging including U/S CT scan confirmed the diagnosis. The risks, benefits and alternatives of laparoscopic cholecystectomy were discussed with the patient and their family preoperatively and they agreed to proceed.        DESCRIPTION OF PROCEDURE:     The patient was taken to the operating room and positioned supine on the operating room table. General anesthesia was initiated. The patient was prepped and draped in the usual sterile fashion. Antibiotics were given preoperatively. SCDs were properly placed on the patient and turned on. An orogastric tube was placed by the anesthesiologist with return of gastric content prior to start of the case.     Local anesthetic was injected prior to all skin incisions. A periumbilical skin incision was then created inferior to the umbilicus utilizing an 11 blade scalpel. Blunt dissection was carried down to the level of the anterior abdominal wall fascia and the base of the umbilicus. The base of the umbilicus was then  grasped and elevated with a Kocher clamp. A vertical incision was then made in the anterior abdominal wall fascia under direct visualization using cautery. Following this, the peritoneal cavity was then entered under direct visualization. Stay sutures of 0 Vicryl were placed around the defect, and a 12 mm Jacques trocar was then advanced without difficulty into the abdominal cavity. Pneumoperitoneum was established at 15 mmHg. The abdomen was then surveyed with a 10mm 30 degree laparoscope, with special attention to the viscera underlying the insertion site which was found to be free of injury.  Next, under direct laparoscopic visualization three additional 5 mm trocars were placed in the right upper quadrant. The patient was then positioned in the head-up position with the table tilted to the left.    The gallbladder was somewhat distended and had wall edema consistent with cholecystitis. Initially it was tense and we were unable to grasp it. Using an aspirating needle, we aspirated the gallbladder at the dome and drained bilious fluid. This was passed off the field for culture. The fundus of the gallbladder was then retracted cephalad while the infundibulum of the gallbladder was retracted laterally. Using a combination of hook cautery as well as a [place] dissector, the peritoneum surrounding the cystic pedicle was stripped. Cystic structures were dissected. A critical view of safety was established, meanin and only 2 structures were visualized entering the gallbladder, all fibrous and fatty tissue between the cystic artery and cystic duct were cleared, and the posterior one-third of the gallbladder was removed from the cystic plate. Next 2 clips were placed on the distal cystic duct, 1 clip was placed on the proximal cystic duct, and the duct was transected with laparoscopic scissors. Next 2 clips were placed on the proximal cystic artery, 1 clip was placed on the distal cystic artery and this was transected  with laparoscopic scissors. Next the gallbladder was removed from the cystic plate using hook electrocautery.      A 5 mm 30 degree laparoscope was then inserted through the subxiphoid trocar site to visualize the placement of the gallbladder within an Endo Catch bag and then extraction of the gallbladder through the periumbilical trocar site utilizing the Endo Catch bag. Instruments were returned to their native positions. Hemostasis of the cystic plate was confirmed using electrocautery. The clipped cystic structures were carefully examined and there was no sign of bilious or bloody drainage. The table was then leveled and limited irrigation of the right upper quadrant was performed with normal saline and hemostasis again confirmed. Next, the 5 mm trocars were removed under direct laparoscopic visualization. The 12 mm trocar was then withdrawn and pneumoperitoneum was released.     The fascia of the periumbilical trocar site was then closed in a figure-of-eight fashion with an 0 Vicryl suture. All wound sites were irrigated and hemostasis confirmed. The skin incisions were then closed using a 4-0 Monocryl suture in the subcuticular layer. Mastisol and Steri-Strips were then applied to each incision site with a clean and dry dressing over this.    After the procedure, the patient was awakened, extubated, and taken to the postoperative anesthesia care unit for recovery. All needle, instrument, and lap counts were correct. I was personally present and performed all portions of the procedure. There were no immediate complications         Harshad Hdz MD  3/13/2022  09:08 EDT

## 2022-03-13 NOTE — PROGRESS NOTES
"Patient Name:  Helga Cardenas  YOB: 1955  [id number]    Surgery Progress Note    Date of visit: 3/13/2022      Subjective: No acute events overnight.  Feels significantly better.  Denies abdominal pain this morning.  Denies nausea or vomiting          Objective:     /83 (BP Location: Right arm, Patient Position: Lying)   Pulse 67   Temp 98.8 °F (37.1 °C) (Temporal)   Resp 18   Ht 152.4 cm (60\")   Wt 55 kg (121 lb 3.2 oz)   LMP  (LMP Unknown) Comment: LAST PAP 5/2020 BY DR DUARTE  SpO2 97%   BMI 23.67 kg/m²     Intake/Output Summary (Last 24 hours) at 3/13/2022 0750  Last data filed at 3/12/2022 2000  Gross per 24 hour   Intake 2150 ml   Output --   Net 2150 ml       GEN:   Awake, alert, in no acute distress, resting comfortably in bed   CV:   Regular rate and rhythm  L:  Clear to auscultation bilaterally, not labored on room air   Abd:  Soft, not tender, not distended  Ext:  No cyanosis, clubbing, or edema    Recent labs that are back at this time have been reviewed.           Assessment/ Plan:    Mrs. Cardenas is a 66-year-old lady with history of GERD and hypertension with concern for cholecystitis     #Cholecystitis, concern for choledocholithiasis  -Vital signs stable overnight  -White blood cell count is down at 11.  Bilirubin is down to 0.6.  Transaminases are improved.  -Underwent ERCP yesterday with choledocholithiasis identified and cleared  -Abdominal pain is resolved and feels significantly better after ERCP.  -I have recommended to proceed to the operating room today for laparoscopic cholecystectomy    I had a long discussion with the patient and their family regarding the risks, benefits, and alternatives to the procedure including specifically discussing the risks of bleeding, infection, damage to adjacent structures, possible need for further operations, possible need for conversion to an open operation, risks of common bile duct injury and risk of bile leak " after the procedure. They wish to proceed.     Harshad Hdz MD  3/13/2022  07:50 EDT

## 2022-03-13 NOTE — PROGRESS NOTES
"GI Daily Progress Note  Subjective:    Chief Complaint: Follow-up choledocholithiasis    Patient ambulating in room at time of exam, having just came back from laparoscopic cholecystectomy per Dr. Hdz.  Notes she is feeling well with mild postsurgical abdominal pain but otherwise feels much improved from prior days.  No new or worsening symptoms reported.    Objective:    /68 (BP Location: Right arm, Patient Position: Lying)   Pulse 64   Temp 97.4 °F (36.3 °C) (Oral)   Resp 18   Ht 152.4 cm (60\")   Wt 55 kg (121 lb 3.2 oz)   LMP  (LMP Unknown) Comment: LAST PAP 5/2020 BY DR DUARTE  SpO2 98%   BMI 23.67 kg/m²     Physical Exam  Vitals and nursing note reviewed.   Constitutional:       General: She is not in acute distress.     Appearance: Normal appearance. She is normal weight. She is not ill-appearing or toxic-appearing.   HENT:      Head: Normocephalic and atraumatic.   Eyes:      General: No scleral icterus.     Extraocular Movements: Extraocular movements intact.      Conjunctiva/sclera: Conjunctivae normal.      Pupils: Pupils are equal, round, and reactive to light.   Cardiovascular:      Rate and Rhythm: Normal rate and regular rhythm.      Pulses: Normal pulses.      Heart sounds: Normal heart sounds.   Pulmonary:      Effort: Pulmonary effort is normal. No respiratory distress.      Breath sounds: Normal breath sounds.   Abdominal:      General: Abdomen is flat. Bowel sounds are normal. There is no distension.      Palpations: Abdomen is soft. There is no mass.      Tenderness: There is abdominal tenderness. There is no guarding or rebound.      Hernia: No hernia is present.      Comments: Laparoscopic incisions on abdomen.   Skin:     General: Skin is warm and dry.      Capillary Refill: Capillary refill takes less than 2 seconds.      Coloration: Skin is not jaundiced or pale.   Neurological:      General: No focal deficit present.      Mental Status: She is alert and oriented to person, " place, and time.   Psychiatric:         Mood and Affect: Mood normal.         Behavior: Behavior normal.         Thought Content: Thought content normal.         Judgment: Judgment normal.         Lab  I have personally reviewed most recent cardiac tracings, lab results and radiology images and interpretations and agree with findings.  Lab Results   Component Value Date    WBC 11.15 (H) 03/13/2022    HGB 11.9 (L) 03/13/2022    HGB 12.0 03/12/2022    HGB 14.0 03/11/2022    MCV 90.2 03/13/2022     03/13/2022       Lab Results   Component Value Date    GLUCOSE 118 (H) 03/13/2022    BUN 10 03/13/2022    CREATININE 0.51 (L) 03/13/2022    EGFRIFNONA 97 11/09/2021    BCR 19.6 03/13/2022     (L) 03/13/2022    K 3.3 (L) 03/13/2022    CO2 25.0 03/13/2022    CALCIUM 8.5 (L) 03/13/2022    ALBUMIN 3.10 (L) 03/13/2022    ALKPHOS 239 (H) 03/13/2022    BILITOT 0.6 03/13/2022     (H) 03/13/2022     (H) 03/13/2022       Assessment:      Cholecystitis    Essential hypertension    GERD (gastroesophageal reflux disease)    Calculus of gallbladder with acute cholecystitis without obstruction    Acute cholecystitis    Acute calculus cholecystitis and choledocholithiasis  Cholestatic/obstructive LFTs, improving hyperbilirubinemia  Status post ERCP  Status post laparoscopic cholecystectomy    Plan:  Patient doing well following ERCP yesterday that she found numerous small gallstones and common bile duct.  Just returned to her room from successful laparoscopic cholecystectomy findings consistent with acute cholecystitis.  >>> Continue postoperative care per primary team.  >>> Patient encouraged to continue ambulation and use of incentive spirometry  >>> We will add simethicone to regimen    At this time, GI will sign off.  Please call with questions.    Stevenson Anaya, VINNIE  03/13/22  16:53 EDT

## 2022-03-13 NOTE — PLAN OF CARE
Goal Outcome Evaluation:  Plan of Care Reviewed With: patient        Progress: improving  VSS on room air, complaints of headache, po prn administered, ambulating in halls, NPO for cholecystectomy today

## 2022-03-13 NOTE — PLAN OF CARE
Goal Outcome Evaluation:  Plan of Care Reviewed With: patient        Progress: improving  Outcome Evaluation: Patient went to surgery this morning and returned alert and oriented. Meeting all ERAS protocol. Continue to monitor overnight.

## 2022-03-13 NOTE — ANESTHESIA PREPROCEDURE EVALUATION
Anesthesia Evaluation                  Airway   Mallampati: II  Dental      Pulmonary    Cardiovascular     (+) hypertension,       Neuro/Psych  GI/Hepatic/Renal/Endo    (+)  GERD,      Musculoskeletal     Abdominal    Substance History      OB/GYN          Other                        Anesthesia Plan    ASA 2     general     intravenous induction     Anesthetic plan, all risks, benefits, and alternatives have been provided, discussed and informed consent has been obtained with: patient.    Plan discussed with CRNA.        CODE STATUS:    Code Status (Patient has no pulse and is not breathing): CPR (Attempt to Resuscitate)  Medical Interventions (Patient has pulse or is breathing): Full Support

## 2022-03-13 NOTE — ANESTHESIA POSTPROCEDURE EVALUATION
Patient: Helga Ramirezwhite    Procedure Summary     Date: 03/13/22 Room / Location:  [facility]    Anesthesia Start: 0752 Anesthesia Stop: 0917    Procedure: CHOLECYSTECTOMY LAPAROSCOPIC (N/A Abdomen) Diagnosis:     Surgeons: Harshad Hdz MD Provider: Derrick Ridley MD    Anesthesia Type: general ASA Status: 2          Anesthesia Type: general    Vitals  Vitals Value Taken Time   BP     Temp     Pulse 69 03/13/22 0917   Resp     SpO2 98 % 03/13/22 0917   Vitals shown include unvalidated device data.        Post Anesthesia Care and Evaluation    Patient location during evaluation: PACU  Patient participation: complete - patient participated  Level of consciousness: awake and alert  Pain score: 0  Pain management: adequate  Airway patency: patent  Anesthetic complications: No anesthetic complications  PONV Status: none  Cardiovascular status: hemodynamically stable and acceptable  Respiratory status: nonlabored ventilation, acceptable and nasal cannula  Hydration status: acceptable       4

## 2022-03-13 NOTE — PROGRESS NOTES
"Patient Name:  Helga Cardenas  YOB: 1955  [id number]    Surgery Post - Operative Note    Date of visit: 3/13/2022      Subjective: doing well. Pain controlled. Wants to get out of bed to chair        Objective:    /94 (BP Location: Right arm, Patient Position: Lying)   Pulse 57   Temp 98.3 °F (36.8 °C) (Oral)   Resp 16   Ht 152.4 cm (60\")   Wt 55 kg (121 lb 3.2 oz)   LMP  (LMP Unknown) Comment: LAST PAP 5/2020 BY DR DUARTE  SpO2 97%   BMI 23.67 kg/m²     CV:  Regular rate and rhythm   L:  Clear to auscultation bilaterally. Not labored on O2 by NC   ABD:  Soft, appropriately tender. Dressings clean, dry and intact   EXT:  No cyanosis, clubbing or edema        Assessment/ Plan:     Recovering well after laparoscopic cholecystectomy. Continue Pulmonary toilet        Harshad Hdz MD  3/13/2022  14:44 EDT      "

## 2022-03-14 ENCOUNTER — READMISSION MANAGEMENT (OUTPATIENT)
Dept: CALL CENTER | Facility: HOSPITAL | Age: 67
End: 2022-03-14

## 2022-03-14 VITALS
SYSTOLIC BLOOD PRESSURE: 156 MMHG | BODY MASS INDEX: 23.8 KG/M2 | OXYGEN SATURATION: 96 % | HEART RATE: 61 BPM | RESPIRATION RATE: 18 BRPM | HEIGHT: 60 IN | TEMPERATURE: 98.3 F | DIASTOLIC BLOOD PRESSURE: 77 MMHG | WEIGHT: 121.2 LBS

## 2022-03-14 LAB
ALBUMIN SERPL-MCNC: 3.3 G/DL (ref 3.5–5.2)
ALBUMIN/GLOB SERPL: 1.2 G/DL
ALP SERPL-CCNC: 189 U/L (ref 39–117)
ALT SERPL W P-5'-P-CCNC: 110 U/L (ref 1–33)
ANION GAP SERPL CALCULATED.3IONS-SCNC: 7 MMOL/L (ref 5–15)
AST SERPL-CCNC: 47 U/L (ref 1–32)
BASOPHILS # BLD AUTO: 0.01 10*3/MM3 (ref 0–0.2)
BASOPHILS NFR BLD AUTO: 0.1 % (ref 0–1.5)
BILIRUB SERPL-MCNC: 0.4 MG/DL (ref 0–1.2)
BUN SERPL-MCNC: 9 MG/DL (ref 8–23)
BUN/CREAT SERPL: 18.8 (ref 7–25)
CALCIUM SPEC-SCNC: 8.7 MG/DL (ref 8.6–10.5)
CHLORIDE SERPL-SCNC: 103 MMOL/L (ref 98–107)
CO2 SERPL-SCNC: 25 MMOL/L (ref 22–29)
CREAT SERPL-MCNC: 0.48 MG/DL (ref 0.57–1)
DEPRECATED RDW RBC AUTO: 44.2 FL (ref 37–54)
EGFRCR SERPLBLD CKD-EPI 2021: 105.3 ML/MIN/1.73
EOSINOPHIL # BLD AUTO: 0.02 10*3/MM3 (ref 0–0.4)
EOSINOPHIL NFR BLD AUTO: 0.2 % (ref 0.3–6.2)
ERYTHROCYTE [DISTWIDTH] IN BLOOD BY AUTOMATED COUNT: 12.8 % (ref 12.3–15.4)
GLOBULIN UR ELPH-MCNC: 2.7 GM/DL
GLUCOSE SERPL-MCNC: 134 MG/DL (ref 65–99)
HCT VFR BLD AUTO: 33.4 % (ref 34–46.6)
HGB BLD-MCNC: 11 G/DL (ref 12–15.9)
IMM GRANULOCYTES # BLD AUTO: 0.08 10*3/MM3 (ref 0–0.05)
IMM GRANULOCYTES NFR BLD AUTO: 0.7 % (ref 0–0.5)
LYMPHOCYTES # BLD AUTO: 0.84 10*3/MM3 (ref 0.7–3.1)
LYMPHOCYTES NFR BLD AUTO: 7.5 % (ref 19.6–45.3)
MCH RBC QN AUTO: 31.1 PG (ref 26.6–33)
MCHC RBC AUTO-ENTMCNC: 32.9 G/DL (ref 31.5–35.7)
MCV RBC AUTO: 94.4 FL (ref 79–97)
MONOCYTES # BLD AUTO: 1.01 10*3/MM3 (ref 0.1–0.9)
MONOCYTES NFR BLD AUTO: 9 % (ref 5–12)
NEUTROPHILS NFR BLD AUTO: 82.5 % (ref 42.7–76)
NEUTROPHILS NFR BLD AUTO: 9.22 10*3/MM3 (ref 1.7–7)
NRBC BLD AUTO-RTO: 0 /100 WBC (ref 0–0.2)
PLATELET # BLD AUTO: 241 10*3/MM3 (ref 140–450)
PMV BLD AUTO: 9.1 FL (ref 6–12)
POTASSIUM SERPL-SCNC: 4.5 MMOL/L (ref 3.5–5.2)
PROT SERPL-MCNC: 6 G/DL (ref 6–8.5)
RBC # BLD AUTO: 3.54 10*6/MM3 (ref 3.77–5.28)
SODIUM SERPL-SCNC: 135 MMOL/L (ref 136–145)
WBC NRBC COR # BLD: 11.18 10*3/MM3 (ref 3.4–10.8)

## 2022-03-14 PROCEDURE — G0378 HOSPITAL OBSERVATION PER HR: HCPCS

## 2022-03-14 PROCEDURE — 85025 COMPLETE CBC W/AUTO DIFF WBC: CPT | Performed by: SURGERY

## 2022-03-14 PROCEDURE — 99217 PR OBSERVATION CARE DISCHARGE MANAGEMENT: CPT | Performed by: NURSE PRACTITIONER

## 2022-03-14 PROCEDURE — 80053 COMPREHEN METABOLIC PANEL: CPT | Performed by: SURGERY

## 2022-03-14 PROCEDURE — 25010000002 PIPERACILLIN SOD-TAZOBACTAM PER 1 G: Performed by: SURGERY

## 2022-03-14 PROCEDURE — 25010000002 HEPARIN (PORCINE) PER 1000 UNITS: Performed by: SURGERY

## 2022-03-14 RX ORDER — PSEUDOEPHEDRINE HCL 30 MG
100 TABLET ORAL 2 TIMES DAILY PRN
Qty: 60 CAPSULE | Refills: 0 | Status: CANCELLED | OUTPATIENT
Start: 2022-03-14

## 2022-03-14 RX ORDER — HYDROCODONE BITARTRATE AND ACETAMINOPHEN 5; 325 MG/1; MG/1
1 TABLET ORAL EVERY 4 HOURS PRN
Qty: 18 TABLET | Refills: 0 | Status: SHIPPED | OUTPATIENT
Start: 2022-03-14 | End: 2022-03-17

## 2022-03-14 RX ADMIN — TAZOBACTAM SODIUM AND PIPERACILLIN SODIUM 3.38 G: 375; 3 INJECTION, SOLUTION INTRAVENOUS at 05:43

## 2022-03-14 RX ADMIN — PANTOPRAZOLE SODIUM 40 MG: 40 TABLET, DELAYED RELEASE ORAL at 05:43

## 2022-03-14 RX ADMIN — HEPARIN SODIUM 5000 UNITS: 5000 INJECTION, SOLUTION INTRAVENOUS; SUBCUTANEOUS at 05:43

## 2022-03-14 RX ADMIN — ACETAMINOPHEN 650 MG: 325 TABLET ORAL at 10:06

## 2022-03-14 RX ADMIN — HYDROCODONE BITARTRATE AND ACETAMINOPHEN 1 TABLET: 5; 325 TABLET ORAL at 02:52

## 2022-03-14 NOTE — PROGRESS NOTES
"Patient Name:  Helga Cardenas  YOB: 1955  [id number]    Surgery Progress Note    Date of visit: 3/14/2022      Subjective: No acute events overnight.  Some soreness around the incision sites.  Denies nausea or vomiting.  Tolerated regular diet yesterday evening.  Has passed flatus.          Objective:     /69 (BP Location: Right arm, Patient Position: Lying)   Pulse 92   Temp 98 °F (36.7 °C) (Oral)   Resp 18   Ht 152.4 cm (60\")   Wt 55 kg (121 lb 3.2 oz)   LMP  (LMP Unknown) Comment: LAST PAP 5/2020 BY DR DUARTE  SpO2 97%   BMI 23.67 kg/m²     Intake/Output Summary (Last 24 hours) at 3/14/2022 0632  Last data filed at 3/13/2022 2000  Gross per 24 hour   Intake 400 ml   Output 35 ml   Net 365 ml       GEN:   Awake, alert, in no acute distress, resting comfortably in bed   CV:   Regular rate and rhythm  L:  Clear to auscultation bilaterally, not labored on room air   Abd:  Soft, appropriately tender to palpation along incisions, incisions are clean dry and intact  Ext:  No cyanosis, clubbing, or edema    Recent labs that are back at this time have been reviewed.           Assessment/ Plan:    Mrs. Cardenas is a 66-year-old lady with history of GERD and hypertension with concern for cholecystitis     #Cholecystitis, concern for choledocholithiasis  -Postoperative day 1 after laparoscopic cholecystectomy  -Vital signs stable.  Labs are without acute findings  -Doing well.  Tolerating regular diet.  Has passed flatus.  -No longer a need for antibiotics from my standpoint  -okay to discharge from my standpoint today.  Should follow-up with me in 2 weeks.  No lifting more than 10 pounds for the next 2 weeks.  Okay to shower now.      Harshad Hdz MD  3/14/2022  06:32 EDT      "

## 2022-03-14 NOTE — PLAN OF CARE
Goal Outcome Evaluation:  Plan of Care Reviewed With: patient        Progress: improving  Outcome Evaluation: VSS, room air, non tele, complaints of mild upper abd pain, prns administered with reported relief, ambulating independently, fluids infusing, will continue plan of care

## 2022-03-14 NOTE — CASE MANAGEMENT/SOCIAL WORK
Discharge Planning Assessment  [facility]     Patient Name: Helga Cardenas  MRN: [id number]  Today's Date: 3/14/2022    Admit Date: 3/11/2022     Discharge Needs Assessment     Row Name 03/14/22 1045       Living Environment    People in Home spouse;child(manas), dependent  pot resides in [facility]    Name(s) of People in Home  Riley and pt is caring for 3 minor children at this time    Current Living Arrangements home    Primary Care Provided by self    Provides Primary Care For child(manas)    Family Caregiver if Needed spouse;child(manas), adult    Family Caregiver Names Pt has assistance from her  Riley and her 3 adult sons and daughter in laws    Quality of Family Relationships helpful;involved;supportive    Able to Return to Prior Arrangements yes       Resource/Environmental Concerns    Resource/Environmental Concerns none       Transition Planning    Patient/Family Anticipates Transition to home with family    Patient/Family Anticipated Services at Transition none    Transportation Anticipated family or friend will provide       Discharge Needs Assessment    Readmission Within the Last 30 Days no previous admission in last 30 days    Equipment Currently Used at Home none    Anticipated Changes Related to Illness none    Equipment Needed After Discharge none    Provided Post Acute Provider List? N/A    Provided Post Acute Provider Quality & Resource List? N/A               Discharge Plan     Row Name 03/14/22 1047       Plan    Plan home    Patient/Family in Agreement with Plan yes    Plan Comments CM spoke with pt at bedside. Pt resides in [facility] with her  and 3 minor children that pt and  are caring for. Pt reports she is independent of adls and denies use of DME. Pt is not current with home health services and see's a chiropractor as needed. Pt has received her covid vaccinations.Pt confirms she has [place] medicare, denies concerns or disruption in coverage.  Pt has prescription drug coverage and denies issues obtaining or affording current medications. Pt plans to return home and denies discharge needs. Pt will have private transportation at discharge.    Final Discharge Disposition Code 01 - home or self-care              Continued Care and Services - Admitted Since 3/11/2022    Coordination has not been started for this encounter.       Expected Discharge Date and Time     Expected Discharge Date Expected Discharge Time    Mar 14, 2022          Demographic Summary     Row Name 03/14/22 1044       General Information    Referral Source admission list    Reason for Consult discharge planning    Preferred Language English    General Information Comments PCP- Rey Barba       Contact Information    Permission Granted to Share Info With                Functional Status     Row Name 03/14/22 1044       Functional Status    Usual Activity Tolerance good    Current Activity Tolerance moderate       Functional Status, IADL    Medications independent    Meal Preparation independent    Housekeeping independent    Laundry independent    Shopping independent       Mental Status    General Appearance WDL WDL       Mental Status Summary    Recent Changes in Mental Status/Cognitive Functioning no changes       Employment/    Employment/ Comments Pt confirms she has [place] medicare, denies concerns or disruption in coverage. Pt has prescription drug coverage and denies issues obtaining or affording current medications.               Psychosocial    No documentation.                Abuse/Neglect    No documentation.                Legal    No documentation.                Substance Abuse    No documentation.                Patient Forms    No documentation.                   Ceci Katz RN

## 2022-03-14 NOTE — DISCHARGE SUMMARY
[facility] Medicine Services  DISCHARGE SUMMARY    Patient Name: Helga Cardenas  : 1955  MRN: [id number]    Date of Admission: 3/11/2022  6:25 PM  Date of Discharge:  3/14/2022  Primary Care Physician: Rey Desouza MD    Consults     Date and Time Order Name Status Description    3/12/2022 12:34 AM Inpatient General Surgery Consult Completed     3/11/2022 10:19 PM Inpatient General Surgery Consult Completed           Hospital Course     Presenting Problem:   Cholecystitis [K81.9]  Acute cholecystitis [K81.0]    Active Hospital Problems    Diagnosis  POA   • **Cholecystitis [K81.9]  Yes   • Acute cholecystitis [K81.0]  Yes   • GERD (gastroesophageal reflux disease) [K21.9]  Yes   • Calculus of gallbladder with acute cholecystitis without obstruction [K80.00]  Unknown   • Essential hypertension [I10]  Yes      Resolved Hospital Problems   No resolved problems to display.          Hospital Course:  Helga Cardenas is a 66 y.o. female with GERD and HTN who presented with abdominal pain and workup concerning for acute cholecystitis with possible choledocholithiasis.      This patient's problems and plans were partially entered by my partner and updated as appropriate by me 22.       Acute cholecystitis, possible choledocholithiasis   -S/P ERCP with multiple stones removed by GI.   -S/P cholecystectomy by Dr. Hdz on 3/13.   -Doing well since [facility].  Pain well controlled. Wants to go home.   -No lifting over 10 pounds for 2 weeks.  Okay to shower  -Follow up with Dr. Hdz in 2 weeks.      Hypertension  -Losartan-HCTZ on hold during hospitalization. Resume at discharge.      Hypokalemia  -Resolved with replacement.       Discharge Follow Up Recommendations for outpatient labs/diagnostics:   Follow up with PCP, first available hospital follow up appt.  Follow up with Dr. Hdz in 2 weeks.     Day of Discharge     HPI:   Up mobilizing in the room. Eager  to discharge home. Pain well controlled.     Review of Systems  Gen- No fevers, chills  CV- No chest pain, palpitations  Resp- No cough, dyspnea  GI- No N/V/D, + abd tenderness      Vital Signs:   Temp:  [97.4 °F (36.3 °C)-98.4 °F (36.9 °C)] 98.3 °F (36.8 °C)  Heart Rate:  [56-92] 61  Resp:  [18] 18  BP: (136-168)/(65-77) 156/77      Physical Exam:  Constitutional: No acute distress, awake, alert, up walking in the room  HENT: NCAT, mucous membranes moist  Respiratory: Clear to auscultation bilaterally, respiratory effort normal on room air  Cardiovascular: RRR, no murmurs, rubs, or gallops  Gastrointestinal: Positive bowel sounds, soft, nontender, nondistended  Musculoskeletal: No bilateral ankle edema  Psychiatric: Appropriate affect, cooperative  Neurologic: Oriented x 3, strength symmetric in all extremities, Cranial Nerves grossly intact to confrontation, speech clear  Skin: No rashes noted to exposed skin, lap sites intact.       Pertinent  and/or Most Recent Results     LAB RESULTS:      Lab 03/14/22  0551 03/13/22  0606 03/12/22  1004 03/11/22 2233 03/11/22  1726   WBC 11.18* 11.15* 15.57* 14.98* 16.21*   HEMOGLOBIN 11.0* 11.9* 12.0 14.0 14.3   HEMATOCRIT 33.4* 34.0 36.5 40.4 41.9   PLATELETS 241 256 255 293 323   NEUTROS ABS 9.22*  --  13.68*  --  15.35*   IMMATURE GRANS (ABS) 0.08*  --  0.10*  --  0.09*   LYMPHS ABS 0.84  --  0.69*  --  0.47*   MONOS ABS 1.01*  --  1.01*  --  0.24   EOS ABS 0.02  --  0.05  --  0.04   MCV 94.4 90.2 94.8 90.6 90.9   LACTATE  --   --   --  1.2  --          Lab 03/14/22  0551 03/13/22  0606 03/12/22  1350 03/12/22  1004 03/11/22 2233 03/11/22  1836   SODIUM 135* 134*  --  134* 132* 133*   POTASSIUM 4.5 3.3* 3.7 3.4* 2.5* 2.8*   CHLORIDE 103 99  --  98 91* 93*   CO2 25.0 25.0  --  27.0 27.0 28.0   ANION GAP 7.0 10.0  --  9.0 14.0 12.0   BUN 9 10  --  12 16 14   CREATININE 0.48* 0.51*  --  0.54* 0.68 0.64   EGFR 105.3 103.7  --  102.3 96.8 98.2   GLUCOSE 134* 118*  --  99 171*  149*   CALCIUM 8.7 8.5*  --  8.7 9.7 9.8   MAGNESIUM  --  2.4  --  1.7 1.6 1.7         Lab 03/14/22  0551 03/13/22  0606 03/12/22  1004 03/11/22 2233 03/11/22  1836   TOTAL PROTEIN 6.0 6.0 6.1 7.3 7.6   ALBUMIN 3.30* 3.10* 3.50 4.20 4.40   GLOBULIN 2.7 2.9 2.6 3.1 3.2   ALT (SGPT) 110* 166* 114* 133* 35*   AST (SGOT) 47* 159* 122* 340* 68*   BILIRUBIN 0.4 0.6 1.0 3.3* 1.4*   ALK PHOS 189* 239* 166* 218* 164*   LIPASE  --   --   --   --  14                     Brief Urine Lab Results  (Last result in the past 365 days)      Color   Clarity   Blood   Leuk Est   Nitrite   Protein   CREAT   Urine HCG        03/11/22 1732 Yellow   Clear   Negative   Negative   Negative   Negative               Microbiology Results (last 10 days)     Procedure Component Value - Date/Time    Tissue / Bone Culture - Tissue, Gallbladder [[id number]] Collected: 03/13/22 0858    Lab Status: Preliminary result Specimen: Tissue from Gallbladder Updated: 03/13/22 1141     Gram Stain Rare (1+) WBCs seen      Occasional Gram positive cocci in pairs    Blood Culture - Blood, Hand, Left [[id number]]  (Normal) Collected: 03/11/22 2231    Lab Status: Preliminary result Specimen: Blood from Hand, Left Updated: 03/14/22 0400     Blood Culture No growth at 2 days    Blood Culture - Blood, Arm, Left [[id number]]  (Normal) Collected: 03/11/22 2229    Lab Status: Preliminary result Specimen: Blood from Arm, Left Updated: 03/14/22 0400     Blood Culture No growth at 2 days    COVID PRE-OP / PRE-PROCEDURE SCREENING ORDER (NO ISOLATION) - Swab, Nasopharynx [[id number]]  (Normal) Collected: 03/11/22 2018    Lab Status: Final result Specimen: Swab from Nasopharynx Updated: 03/11/22 2047    Narrative:      The following orders were created for panel order COVID PRE-OP / PRE-PROCEDURE SCREENING ORDER (NO ISOLATION) - Swab, Nasopharynx.  Procedure                               Abnormality         Status                     ---------                                -----------         ------                     COVID-19, ABBOTT IN-HOUS...[[id number]]  Normal              Final result                 Please view results for these tests on the individual orders.    COVID-19, ABBOTT IN-HOUSE,NASAL Swab (NO TRANSPORT MEDIA) 2 HR TAT - Swab, Nasopharynx [[id number]]  (Normal) Collected: 03/11/22 2018    Lab Status: Final result Specimen: Swab from Nasopharynx Updated: 03/11/22 2047     COVID19 Presumptive Negative    Narrative:      Fact sheet for providers: https://www.fda.gov/media/102819/download     Fact sheet for patients: https://www.fda.gov/media/557560/download    Test performed by PCR.  If inconsistent with clinical signs and symptoms patient should be tested with different authorized molecular test.          FL ERCP pancreatic and biliary ducts    Result Date: 3/13/2022  DATE OF EXAM: 3/12/2022 3:33 PM  PROCEDURE: FL ERCP PANCREATIC AND BILIARY DUCTS-  INDICATIONS: pain; K81.0-Acute cholecystitis; K80.00-Calculus of gallbladder with acute cholecystitis without obstruction; E87.6-Hypokalemia  COMPARISON: No comparisons available.  TECHNIQUE: A series of radiographic digital spot films were obtained in conjunction with a endoscopic catheterization of the biliary and pancreatic ductal system, performed by the gastroenterologist.  Fluoroscopic time: 45 seconds  Number of Images: 4  FINDINGS: Endoscope and side cannula is seen with apparent contrast injection and balloon sweep. Please see the procedure report for full details.      Fluoroscopy provided during ERCP.  This report was finalized on 3/13/2022 9:09 AM by Dr. Rafal Fried MD.      US Gallbladder    Result Date: 3/11/2022  DATE OF EXAM: 3/11/2022 6:19 PM  PROCEDURE: US GALLBLADDER-  INDICATIONS: three days RUQ pain, history of gallstones  COMPARISON: Abdominal ultrasound 2/28/2020  TECHNIQUE: Transverse and sagittal grayscale sonographic assessment of the right upper quadrant supplemented with color and spectral Doppler.   FINDINGS: The visualized portions of the pancreas appear normal.  Mild increased echogenicity of the liver parenchyma suggestive of some hepatic steatosis. No focal hepatic lesion. No intrahepatic ductal dilatation. The portal vein is patent with hepatopetal flow. The right hepatic vein is patent.  There is some dependent sludge within the gallbladder. A few mobile dependent echogenic and shadowing gallstones are also noted within the sludge. Moderate diffuse wall thickening measuring up to 6 mm. No pericholecystic fluid. Negative sonographic Pro sign.  Mild prominence of the common bile duct measuring 9 mm in diameter.  Normal appearance of the right kidney.       Gallbladder sludge and mobile shadowing stones with moderate diffuse thickening of the gallbladder wall, without pericholecystic fluid. In the setting of leukocytosis and reported right upper quadrant pain, the findings are suspicious for early acute cholecystitis.  This report was finalized on 3/11/2022 7:23 PM by Vidal Puga MD.      MRI abdomen wo contrast mrcp    Result Date: 3/11/2022  DATE OF EXAM: 3/11/2022 8:49 PM  PROCEDURE: MRI ABDOMEN WO CONTRAST MRCP-  INDICATIONS: cholecystatic pattern; K81.0-Acute cholecystitis; K80.00-Calculus of gallbladder with acute cholecystitis without obstruction; E87.6-Hypokalemia  COMPARISON: Gallbladder ultrasound 3/11/2022  TECHNIQUE: Multiplanar/multisequence MRI imaging of the abdomen was performed without gadolinium contrast administration.  FINDINGS: Distended gallbladder with dependent sludge and a few dependent subcentimeter gallstones. No definite evidence of stone within the gallbladder neck or cystic duct. There is questionable pericholecystic edema or fluid (series 2 image 23), although a component of this appearance could reflect respiratory motion artifact. No evidence of choledocholithiasis. Mild diffuse prominence of the common bile duct measuring up to 9 mm in diameter. Mild central  intrahepatic ductal prominence.  Scattered subcentimeter T2 hyperintensities in the liver likely reflect tiny hepatic cysts or biliary hamartomas.  Normal appearance of the pancreas. Normal appearance of the main pancreatic duct without pancreatic ductal dilatation.  The lung bases appear grossly clear.  Unremarkable appearance of the spleen.  Unremarkable appearance of the kidneys.  The imaged portions of the GI tract appear unremarkable.      Distended gallbladder with dependent sludge and a few subcentimeter stones within the gallbladder lumen. There is questionable pericholecystic edema or fluid although a component of this appearance could reflect respiratory motion artifact. Overall findings remain concerning for acute cholecystitis. A distinct stone is not identified within the gallbladder neck or cystic duct, however.  Diffuse prominence of the common bile duct measuring up to 9 mm in diameter, with mild central intrahepatic ductal prominence, without definite evidence of choledocholithiasis.  This report was finalized on 3/11/2022 10:27 PM by Vidal Puga MD.                    Plan for Follow-up of Pending Labs/Results:   Pending Labs     Order Current Status    Anaerobic Culture - Tissue, Gallbladder In process    Tissue Pathology Exam In process    Blood Culture - Blood, Arm, Left Preliminary result    Blood Culture - Blood, Hand, Left Preliminary result    Tissue / Bone Culture - Tissue, Gallbladder Preliminary result        Discharge Details        Discharge Medications      New Medications      Instructions Start Date   HYDROcodone-acetaminophen 5-325 MG per tablet  Commonly known as: NORCO   1 tablet, Oral, Every 4 Hours PRN         Continue These Medications      Instructions Start Date   estradiol 0.5 MG tablet  Commonly known as: ESTRACE   1 tablet, Oral, Daily      losartan-hydrochlorothiazide 100-25 MG per tablet  Commonly known as: HYZAAR   TAKE ONE TABLET BY MOUTH DAILY      pantoprazole 40  MG EC tablet  Commonly known as: PROTONIX   TAKE ONE TABLET BY MOUTH DAILY      Triamcinolone Acetonide 55 MCG/ACT nasal inhaler  Commonly known as: NASACORT   2 sprays, Nasal, Daily PRN         Stop These Medications    cetirizine 10 MG tablet  Commonly known as: zyrTEC     levocetirizine 5 MG tablet  Commonly known as: XYZAL            No Known Allergies      Discharge Disposition:  Home or Self Care    Diet:  Hospital:  Diet Order   Procedures   • Diet Regular; Low Fat       Activity:  Activity Instructions     Other Activity Instructions      Activity Instructions: No lifting over 10 pounds for 2 weeks. You may shower             CODE STATUS:    Code Status and Medical Interventions:   Ordered at: 03/13/22 1230     Level Of Support Discussed With:    Patient     Code Status (Patient has no pulse and is not breathing):    CPR (Attempt to Resuscitate)     Medical Interventions (Patient has pulse or is breathing):    Full Support       Future Appointments   Date Time Provider Department Center   3/21/2022  1:00 PM Rey Desouza MD [facility]       Additional Instructions for the Follow-ups that You Need to Schedule     Discharge Follow-up with PCP   As directed       Currently Documented PCP:    Rey Desouza MD    PCP Phone Number:    [phone]     Follow Up Details: first available hospital follow up appt.         Discharge Follow-up with Specified Provider: Dr. Hdz; 2 Weeks   As directed      To: Dr. Hdz    Follow Up: 2 Weeks                     VINNIE Benedict  03/14/22      Time Spent on Discharge:  I spent  40 minutes on this discharge activity which included: face-to-face encounter with the patient, reviewing the data in the system, coordination of the care with the nursing staff as well as consultants, documentation, and entering orders.

## 2022-03-14 NOTE — OUTREACH NOTE
Prep Survey    Flowsheet Row Responses   [place] facility patient discharged from? [place]   Is LACE score < 7 ? Yes   Emergency Room discharge w/ pulse ox? No   Eligibility [facility]   Date of Admission 03/11/22   Date of Discharge 03/14/22   Discharge Disposition Home or Self Care   Discharge diagnosis acute cholecystitis, ERCP & lap yarely   Does the patient have one of the following disease processes/diagnoses(primary or secondary)? General Surgery   Does the patient have Home health ordered? No   Is there a DME ordered? No   Prep survey completed? Yes          ROGERS GALO - Registered Nurse

## 2022-03-15 ENCOUNTER — TRANSITIONAL CARE MANAGEMENT TELEPHONE ENCOUNTER (OUTPATIENT)
Dept: CALL CENTER | Facility: HOSPITAL | Age: 67
End: 2022-03-15

## 2022-03-15 LAB
CYTO UR: NORMAL
LAB AP CASE REPORT: NORMAL
LAB AP CLINICAL INFORMATION: NORMAL
PATH REPORT.FINAL DX SPEC: NORMAL
PATH REPORT.GROSS SPEC: NORMAL

## 2022-03-15 NOTE — OUTREACH NOTE
Call Center TCM Note    Flowsheet Row Responses   [facility] patient discharged from? [place]   Does the patient have one of the following disease processes/diagnoses(primary or secondary)? General Surgery   TCM attempt successful? Yes   Call start time 1017   Call end time 1024   Discharge diagnosis acute cholecystitis, ERCP & lap yarely   Meds reviewed with patient/caregiver? Yes   Is the patient having any side effects they believe may be caused by any medication additions or changes? No   Does the patient have all medications related to this admission filled (includes all antibiotics, pain medications, etc.) Yes   Is the patient taking all medications as directed (includes completed medication regime)? Yes   Does the patient have a follow up appointment scheduled with their surgeon? Yes   Has the patient kept scheduled appointments due by today? N/A   Comments Has a hospital followup with Dr De Anda on 3/21/2022   Has home health visited the patient within 72 hours of discharge? N/A   Psychosocial issues? No   Did the patient receive a copy of their discharge instructions? Yes   Nursing interventions Reviewed instructions with patient   What is the patient's perception of their health status since discharge? Improving   Nursing interventions Nurse provided patient education   Is the patient /caregiver able to teach back basic post-op care? Keep incision areas clean,dry and protected, No tub bath, swimming, or hot tub until instructed by MD, Lifting as instructed by MD in discharge instructions, Take showers only when approved by MD-sponge bathe until then   Is the patient/caregiver able to teach back signs and symptoms of incisional infection? Fever, Increased redness, swelling or pain at the incisonal site   Is the patient/caregiver able to teach back steps to recovery at home? Rest and rebuild strength, gradually increase activity   If the patient is a current smoker, are they able to teach back  resources for cessation? Not a smoker   Is the patient/caregiver able to teach back the hierarchy of who to call/visit for symptoms/problems? PCP, Specialist, Home health nurse, Urgent Care, ED, 911 Yes   TCM call completed? Yes   Wrap up additional comments Patient is doing well. Has alot of family support. No needs at this time.           Yuriy Hernandez RN    3/15/2022, 10:24 EDT

## 2022-03-16 LAB
BACTERIA SPEC AEROBE CULT: ABNORMAL
GRAM STN SPEC: ABNORMAL
GRAM STN SPEC: ABNORMAL

## 2022-03-17 LAB
BACTERIA SPEC AEROBE CULT: NORMAL
BACTERIA SPEC AEROBE CULT: NORMAL

## 2022-03-18 LAB — BACTERIA SPEC ANAEROBE CULT: ABNORMAL

## 2022-03-21 ENCOUNTER — LAB (OUTPATIENT)
Dept: LAB | Facility: HOSPITAL | Age: 67
End: 2022-03-21

## 2022-03-21 ENCOUNTER — OFFICE VISIT (OUTPATIENT)
Dept: INTERNAL MEDICINE | Facility: CLINIC | Age: 67
End: 2022-03-21

## 2022-03-21 VITALS
HEIGHT: 60 IN | OXYGEN SATURATION: 99 % | RESPIRATION RATE: 12 BRPM | DIASTOLIC BLOOD PRESSURE: 64 MMHG | WEIGHT: 127 LBS | BODY MASS INDEX: 24.94 KG/M2 | HEART RATE: 68 BPM | SYSTOLIC BLOOD PRESSURE: 122 MMHG | TEMPERATURE: 97.8 F

## 2022-03-21 DIAGNOSIS — K80.00 CALCULUS OF GALLBLADDER WITH ACUTE CHOLECYSTITIS WITHOUT OBSTRUCTION: Primary | ICD-10-CM

## 2022-03-21 DIAGNOSIS — E87.6 HYPOKALEMIA: ICD-10-CM

## 2022-03-21 LAB
BASOPHILS # BLD AUTO: 0.05 10*3/MM3 (ref 0–0.2)
BASOPHILS NFR BLD AUTO: 0.6 % (ref 0–1.5)
DEPRECATED RDW RBC AUTO: 40.6 FL (ref 37–54)
EOSINOPHIL # BLD AUTO: 0.23 10*3/MM3 (ref 0–0.4)
EOSINOPHIL NFR BLD AUTO: 2.9 % (ref 0.3–6.2)
ERYTHROCYTE [DISTWIDTH] IN BLOOD BY AUTOMATED COUNT: 12.4 % (ref 12.3–15.4)
HCT VFR BLD AUTO: 39.7 % (ref 34–46.6)
HGB BLD-MCNC: 13.3 G/DL (ref 12–15.9)
IMM GRANULOCYTES # BLD AUTO: 0.21 10*3/MM3 (ref 0–0.05)
IMM GRANULOCYTES NFR BLD AUTO: 2.7 % (ref 0–0.5)
LYMPHOCYTES # BLD AUTO: 1.54 10*3/MM3 (ref 0.7–3.1)
LYMPHOCYTES NFR BLD AUTO: 19.7 % (ref 19.6–45.3)
MCH RBC QN AUTO: 30.6 PG (ref 26.6–33)
MCHC RBC AUTO-ENTMCNC: 33.5 G/DL (ref 31.5–35.7)
MCV RBC AUTO: 91.5 FL (ref 79–97)
MONOCYTES # BLD AUTO: 0.68 10*3/MM3 (ref 0.1–0.9)
MONOCYTES NFR BLD AUTO: 8.7 % (ref 5–12)
NEUTROPHILS NFR BLD AUTO: 5.1 10*3/MM3 (ref 1.7–7)
NEUTROPHILS NFR BLD AUTO: 65.4 % (ref 42.7–76)
NRBC BLD AUTO-RTO: 0 /100 WBC (ref 0–0.2)
PLATELET # BLD AUTO: 447 10*3/MM3 (ref 140–450)
PMV BLD AUTO: 8.5 FL (ref 6–12)
RBC # BLD AUTO: 4.34 10*6/MM3 (ref 3.77–5.28)
WBC NRBC COR # BLD: 7.81 10*3/MM3 (ref 3.4–10.8)

## 2022-03-21 PROCEDURE — 99213 OFFICE O/P EST LOW 20 MIN: CPT | Performed by: INTERNAL MEDICINE

## 2022-03-21 PROCEDURE — 85025 COMPLETE CBC W/AUTO DIFF WBC: CPT | Performed by: INTERNAL MEDICINE

## 2022-03-21 PROCEDURE — 80053 COMPREHEN METABOLIC PANEL: CPT | Performed by: INTERNAL MEDICINE

## 2022-03-21 RX ORDER — MEDROXYPROGESTERONE ACETATE 5 MG/1
5 TABLET ORAL DAILY
COMMUNITY
Start: 2022-02-26

## 2022-03-21 NOTE — PROGRESS NOTES
Chief Complaint   Patient presents with   • Hospital Follow Up Visit     Gallbladder, March 11-14       History of Present Illness    The patient presents to the office for a follow-up visit from a recent hospitalization. The patient was hospitalized from 11-Mar-2022 through 14-Mar-2022 with cholecystitis, hypokalemia and cholelithiasis. The records have been received and reviewed. The medication list has been reconciled. The hospital stay was uncomplicated. Her in hospital treatment consisted of surgery.  The patient underwent a laparoscopic cholecystectomy.       Since leaving the hospital she reports that she is back to normal. She denies abdominal pain, itchy watery eyes, bone pain, chills, congestion, a dry cough, a wet cough, decreased appetite, diarrhea, dysuria, ear drainage, ear pain, eye drainage, facial pain, fever, a headache, joint pain, myalgias, nasal discharge, nausea, neck stiffness, night sweats, a rash, sneezing, a sore throat, vomiting or wheezing. She also reports that she does not have chest pain, dyspnea, edema, syncope, blurred vision or palpitations.    Review of Systems    CONSTITUTIONAL- Denies Unexplained Weight Loss, Fever, Sweats, Fatigue, Weakness or Malaise.    HENT- Denies Sinus Pain, Decreased Hearing or Tinnitus.    GASTROINTESTINAL- Denies Blood per Rectum, Constipation or Heartburn.    PULMONARY- Denies Sputum Production, Cough, Hemoptysis or Pleuritic Chest Pain.    CARDIOVASCULAR- Denies Claudication or Irregular Heart Beat.    Medications      Current Outpatient Medications:   •  estradiol (ESTRACE) 0.5 MG tablet, Take 1 tablet by mouth Daily., Disp: , Rfl:   •  losartan-hydrochlorothiazide (HYZAAR) 100-25 MG per tablet, TAKE ONE TABLET BY MOUTH DAILY, Disp: 90 tablet, Rfl: 1  •  medroxyPROGESTERone (PROVERA) 5 MG tablet, , Disp: , Rfl:   •  pantoprazole (PROTONIX) 40 MG EC tablet, TAKE ONE TABLET BY MOUTH DAILY, Disp: 90 tablet, Rfl: 1  •  Triamcinolone Acetonide (NASACORT)  "55 MCG/ACT nasal inhaler, 2 sprays into the nostril(s) as directed by provider Daily As Needed., Disp: , Rfl:    Current outpatient and discharge medications have been reconciled for the patient.  Reviewed by: Rey Desouza MD    Allergies    No Known Allergies    Problem List    Patient Active Problem List   Diagnosis   • Essential hypertension   • Hearing loss   • Benign paroxysmal positional vertigo   • Cholecystitis   • GERD (gastroesophageal reflux disease)   • Calculus of gallbladder with acute cholecystitis without obstruction   • Acute cholecystitis       Medications, Allergies, Problems List and Past History were reviewed and updated.    Physical Examination    /64   Pulse 68   Temp 97.8 °F (36.6 °C)   Resp 12   Ht 152.4 cm (60\")   Wt 57.6 kg (127 lb)   LMP  (LMP Unknown) Comment: LAST PAP 5/2020 BY DR DUARTE  SpO2 99%   BMI 24.80 kg/m²     HEENT: Head- Normocephalic Atraumatic. Facies- Within normal limits. Pinnas- Normal texture and shape bilaterally. Canals- Normal bilaterally. TMs- Normal bilaterally. Nares- Patent bilaterally. Nasal Septum- is normal. There is no tenderness to palpation over the frontal or maxillary sinuses. Lids- Normal bilaterally. Conjunctiva- Clear bilaterally. Sclera- Anicteric bilaterally. Oropharynx- Moist with no lesions. Tonsils- No enlargement, erythema or exudate.    Neck: Thyroid- non enlarged, symmetric and has no nodules. No bruits are detected. ROM- Normal Range of Motion with no rigidity.    Lungs: Auscultation- Clear to auscultation bilaterally. There are no retractions, clubbing or cyanosis. The Expiratory to Inspiratory ratio is equal.    Lymph Nodes: Cervical- no enlarged lymph nodes noted.    Cardiovascular: There are no carotid bruits. Heart- Normal Rate with Regular rhythm and no murmurs. There are no gallops. There are no rubs. In the lower extremities there is no edema. The upper extremities do not have edema.    Abdomen: Soft, benign, " non-tender with no masses, hernias, organomegaly or scars.    Impression and Assessment    Hypokalemia.    Cholelithiasis.    Plan    Cholelithiasis Plan: Further plans will be made after the tests are reviewed.     Hypokalemia Plan: Further plans will be made after test results are available.    Diagnoses and all orders for this visit:    1. Calculus of gallbladder with acute cholecystitis without obstruction (Primary)  -     Comprehensive Metabolic Panel; Future  -     CBC & Differential; Future  -     Comprehensive Metabolic Panel  -     CBC & Differential    2. Hypokalemia  -     Comprehensive Metabolic Panel; Future  -     Comprehensive Metabolic Panel    Transitional Care Management Certification  I certify that the following are true:  1. Communication was made within 2 business days of discharge.  2. Complexity of Medical Decision Making is moderate.  3. Face to face visit occurred within 10 days.    *Note: 75837 is for high complexity patients with a face to face visit within 7 days of discharge.  42260 is for high complexity patients with a face to face on days 8-14 post discharge or medium complexity with face to face visit within 14 days post discharge.    A total of 25 minutes was spent in direct patient care activities including obtaining a history and performing a physical examination, reviewing hospital records related to the visit, reviewing the plan of care with the patient, and documentation related to the visit.    Return to Office    The patient was instructed to return for follow-up at the next scheduled visit. The patient was instructed to return sooner if the condition changes, worsens, or does not resolve.

## 2022-03-22 LAB
ALBUMIN SERPL-MCNC: 4.2 G/DL (ref 3.5–5.2)
ALBUMIN/GLOB SERPL: 1.4 G/DL
ALP SERPL-CCNC: 133 U/L (ref 39–117)
ALT SERPL W P-5'-P-CCNC: 23 U/L (ref 1–33)
ANION GAP SERPL CALCULATED.3IONS-SCNC: 11.1 MMOL/L (ref 5–15)
AST SERPL-CCNC: 15 U/L (ref 1–32)
BILIRUB SERPL-MCNC: 0.2 MG/DL (ref 0–1.2)
BUN SERPL-MCNC: 13 MG/DL (ref 8–23)
BUN/CREAT SERPL: 21 (ref 7–25)
CALCIUM SPEC-SCNC: 9.6 MG/DL (ref 8.6–10.5)
CHLORIDE SERPL-SCNC: 96 MMOL/L (ref 98–107)
CO2 SERPL-SCNC: 27.9 MMOL/L (ref 22–29)
CREAT SERPL-MCNC: 0.62 MG/DL (ref 0.57–1)
EGFRCR SERPLBLD CKD-EPI 2021: 99 ML/MIN/1.73
GLOBULIN UR ELPH-MCNC: 2.9 GM/DL
GLUCOSE SERPL-MCNC: 133 MG/DL (ref 65–99)
POTASSIUM SERPL-SCNC: 3.8 MMOL/L (ref 3.5–5.2)
PROT SERPL-MCNC: 7.1 G/DL (ref 6–8.5)
SODIUM SERPL-SCNC: 135 MMOL/L (ref 136–145)

## 2022-07-21 ENCOUNTER — OFFICE VISIT (OUTPATIENT)
Dept: INTERNAL MEDICINE | Facility: CLINIC | Age: 67
End: 2022-07-21

## 2022-07-21 ENCOUNTER — LAB (OUTPATIENT)
Dept: LAB | Facility: HOSPITAL | Age: 67
End: 2022-07-21

## 2022-07-21 VITALS
BODY MASS INDEX: 24.41 KG/M2 | SYSTOLIC BLOOD PRESSURE: 132 MMHG | HEART RATE: 72 BPM | WEIGHT: 125 LBS | TEMPERATURE: 98.4 F | DIASTOLIC BLOOD PRESSURE: 64 MMHG | RESPIRATION RATE: 16 BRPM

## 2022-07-21 DIAGNOSIS — J01.10 ACUTE NON-RECURRENT FRONTAL SINUSITIS: ICD-10-CM

## 2022-07-21 DIAGNOSIS — G89.29 CHRONIC MIDLINE LOW BACK PAIN WITHOUT SCIATICA: ICD-10-CM

## 2022-07-21 DIAGNOSIS — R73.02 IMPAIRED GLUCOSE TOLERANCE: ICD-10-CM

## 2022-07-21 DIAGNOSIS — K21.9 GASTROESOPHAGEAL REFLUX DISEASE WITHOUT ESOPHAGITIS: ICD-10-CM

## 2022-07-21 DIAGNOSIS — M54.50 CHRONIC MIDLINE LOW BACK PAIN WITHOUT SCIATICA: ICD-10-CM

## 2022-07-21 DIAGNOSIS — I10 ESSENTIAL HYPERTENSION: Primary | ICD-10-CM

## 2022-07-21 LAB
ALBUMIN SERPL-MCNC: 4.3 G/DL (ref 3.5–5.2)
ALBUMIN/GLOB SERPL: 1.7 G/DL
ALP SERPL-CCNC: 77 U/L (ref 39–117)
ALT SERPL W P-5'-P-CCNC: 18 U/L (ref 1–33)
ANION GAP SERPL CALCULATED.3IONS-SCNC: 14.3 MMOL/L (ref 5–15)
AST SERPL-CCNC: 26 U/L (ref 1–32)
BILIRUB BLD-MCNC: NEGATIVE MG/DL
BILIRUB SERPL-MCNC: 0.4 MG/DL (ref 0–1.2)
BUN SERPL-MCNC: 12 MG/DL (ref 8–23)
BUN/CREAT SERPL: 21.1 (ref 7–25)
CALCIUM SPEC-SCNC: 9.3 MG/DL (ref 8.6–10.5)
CHLORIDE SERPL-SCNC: 95 MMOL/L (ref 98–107)
CLARITY, POC: CLEAR
CO2 SERPL-SCNC: 28.7 MMOL/L (ref 22–29)
COLOR UR: YELLOW
CREAT SERPL-MCNC: 0.57 MG/DL (ref 0.57–1)
EGFRCR SERPLBLD CKD-EPI 2021: 101 ML/MIN/1.73
EXPIRATION DATE: NORMAL
GLOBULIN UR ELPH-MCNC: 2.5 GM/DL
GLUCOSE SERPL-MCNC: 98 MG/DL (ref 65–99)
GLUCOSE UR STRIP-MCNC: NEGATIVE MG/DL
HBA1C MFR BLD: 6.4 % (ref 4.8–5.6)
KETONES UR QL: NEGATIVE
LEUKOCYTE EST, POC: NEGATIVE
Lab: NORMAL
NITRITE UR-MCNC: NEGATIVE MG/ML
PH UR: 8 [PH] (ref 5–8)
POTASSIUM SERPL-SCNC: 3.6 MMOL/L (ref 3.5–5.2)
PROT SERPL-MCNC: 6.8 G/DL (ref 6–8.5)
PROT UR STRIP-MCNC: NEGATIVE MG/DL
RBC # UR STRIP: NEGATIVE /UL
SODIUM SERPL-SCNC: 138 MMOL/L (ref 136–145)
SP GR UR: 1 (ref 1–1.03)
UROBILINOGEN UR QL: NORMAL

## 2022-07-21 PROCEDURE — 81003 URINALYSIS AUTO W/O SCOPE: CPT | Performed by: INTERNAL MEDICINE

## 2022-07-21 PROCEDURE — 83036 HEMOGLOBIN GLYCOSYLATED A1C: CPT | Performed by: INTERNAL MEDICINE

## 2022-07-21 PROCEDURE — 99214 OFFICE O/P EST MOD 30 MIN: CPT | Performed by: INTERNAL MEDICINE

## 2022-07-21 PROCEDURE — 80053 COMPREHEN METABOLIC PANEL: CPT | Performed by: INTERNAL MEDICINE

## 2022-07-21 RX ORDER — FAMOTIDINE 40 MG/1
40 TABLET, FILM COATED ORAL DAILY
Qty: 30 TABLET | Refills: 2 | Status: SHIPPED | OUTPATIENT
Start: 2022-07-21 | End: 2022-10-13 | Stop reason: SDUPTHER

## 2022-07-21 RX ORDER — AMOXICILLIN AND CLAVULANATE POTASSIUM 875; 125 MG/1; MG/1
1 TABLET, FILM COATED ORAL 2 TIMES DAILY
Qty: 20 TABLET | Refills: 0 | Status: SHIPPED | OUTPATIENT
Start: 2022-07-21 | End: 2022-10-18

## 2022-07-21 RX ORDER — METHYLPREDNISOLONE 4 MG/1
TABLET ORAL
Qty: 21 TABLET | Refills: 0 | Status: SHIPPED | OUTPATIENT
Start: 2022-07-21 | End: 2022-10-18

## 2022-07-21 NOTE — PROGRESS NOTES
Chief Complaint   Patient presents with   • Follow-up     6 month follow up chronic medical problems        History of Present Illness      The patient presents for a follow-up related to hypertension. The patient reports that she has had no headaches, chest pain, dyspnea, edema, syncope, blurred vision or palpitations. She states that she is taking her medication as prescribed. She is not having medication side effects.    The patient presents for a follow-up related to GERD. The patient is on pantoprazole for her gastroesophageal reflux. The medication is taken on a regular basis and gives inadequate relief of the symptoms. She reports heartburn but she denies abdominal pain, belching, diarrhea, dysphagia, early satiety, hoarseness, nausea, odynophagia, rectal bleeding, vomiting or weight loss. The GERD has no known aggravating factors.    The patient presents for a follow-up related to impaired glucose tolerance. She does not check her blood sugars at home. She denies hypoglycemic symptoms. The patient denies polyuria, polydypsia or polyphagia. She reports no symptoms of neuropathy. The patient is not on medication for her impaired glucose tolerance. The patient does check her feet daily at home. She denies orthopnea, paroxysmal nocturnal dyspnea or dyspnea on exertion.    The patient complains of facial pain and nasal congestion.    The patient reports a 10 day history of upper respiratory symptoms. The patient does not have a dry cough, a wet cough, wheezing, fever, eye drainage, ear pain, ear drainage, rash, decreased appetite or sore throat. The patient has not tried anything for treatment of this illness.  The patient presents for a follow-up related to low back pain which has been a chronic problem. She states that the pain was first noted to come on insidiously. The pain is not associated with chills, dysuria, hematuria or a decreased urine stream. The patient denies a past history of malignancy. The pain  radiates to the left thigh. There is no numbness. The patient denies loss of bladder control. The patient denies loss of bowel control. The patient has attempted chiropractic care without significant relief. The patient reports no aggravating factors.    Medications      Current Outpatient Medications:   •  estradiol (ESTRACE) 0.5 MG tablet, Take 1 tablet by mouth Daily., Disp: , Rfl:   •  losartan-hydrochlorothiazide (HYZAAR) 100-25 MG per tablet, TAKE ONE TABLET BY MOUTH DAILY, Disp: 90 tablet, Rfl: 1  •  medroxyPROGESTERone (PROVERA) 5 MG tablet, Take 5 mg by mouth Daily., Disp: , Rfl:   •  pantoprazole (PROTONIX) 40 MG EC tablet, TAKE ONE TABLET BY MOUTH DAILY, Disp: 90 tablet, Rfl: 1  •  Triamcinolone Acetonide (NASACORT) 55 MCG/ACT nasal inhaler, 2 sprays into the nostril(s) as directed by provider Daily As Needed., Disp: , Rfl:      Allergies    No Known Allergies    Problem List    Patient Active Problem List   Diagnosis   • Essential hypertension   • Hearing loss   • Benign paroxysmal positional vertigo   • Cholecystitis   • GERD (gastroesophageal reflux disease)   • Calculus of gallbladder with acute cholecystitis without obstruction   • Acute cholecystitis       Medications, Allergies, Problems List and Past History were reviewed and updated.    Physical Examination    /64 (BP Location: Right arm, Patient Position: Sitting, Cuff Size: Adult)   Pulse 72   Temp 98.4 °F (36.9 °C) (Infrared)   Resp 16   Wt 56.7 kg (125 lb)   LMP  (LMP Unknown) Comment: LAST PAP 5/2020 BY DR DUARTE  Breastfeeding No   BMI 24.41 kg/m²       HEENT: Head- Normocephalic Atraumatic. Facies- Within normal limits. Pinnas- Normal texture and shape bilaterally. Canals- Normal bilaterally. TMs- Normal bilaterally. Nares- Patent bilaterally. Nasal Septum- is normal. There is pain to palpation over the right frontal sinus and left frontal sinus but not over the right maxillary sinus or left maxillary sinus. Lids- Normal  bilaterally. Conjunctiva- Clear bilaterally. Sclera- Anicteric bilaterally. Oropharynx- Moist with no lesions. Tonsils- No enlargement, erythema or exudate.    Neck: Thyroid- non enlarged, symmetric and has no nodules. No bruits are detected. ROM- Normal Range of Motion with no rigidity.    Lungs: Auscultation- Clear to auscultation bilaterally. There are no retractions, clubbing or cyanosis. The Expiratory to Inspiratory ratio is equal.    Lymph Nodes: Cervical- no enlarged lymph nodes noted.    Cardiovascular: There are no carotid bruits. Heart- Normal Rate with Regular rhythm and no murmurs. There are no gallops. There are no rubs. In the lower extremities there is no edema. The upper extremities do not have edema.    Abdomen: Soft, benign, non-tender with no masses, hernias, organomegaly or scars.    Back: The patient has a normal spinal curvature with no evidence of scoliosis. There are no skin lesions noted on the back. Palpation reveals no tenderness and normal muscle tone. The straight leg raising test is negative. The back has normal flexion, extension, rotation, and lateral bending.    Impression and Assessment    Essential Hypertension.    Gastroesophageal Reflux Disease.    Impaired Glucose Tolerance.    Acute Sinusitis.    Low Back Pain.    Plan    Gastroesophageal Reflux Disease Plan: The patient was instructed to continue the current medications. Medication will be added as noted below.    Essential Hypertension Plan: The patient was instructed to continue the current medications.    Impaired Glucose Tolerance Plan: Further plans will be formulated after test results are reviewed.    Acute Sinusitis Plan: Medication will be added as noted below.    Low Back Pain Plan: She was referred to physical therapy. Medication will be added as noted.    Diagnoses and all orders for this visit:    1. Essential hypertension (Primary)  -     Comprehensive Metabolic Panel; Future  -     POC Urinalysis Dipstick,  Automated; Future  -     Comprehensive Metabolic Panel    2. Gastroesophageal reflux disease without esophagitis  -     famotidine (PEPCID) 40 MG tablet; Take 1 tablet by mouth Daily.  Dispense: 30 tablet; Refill: 2    3. Impaired glucose tolerance  -     Comprehensive Metabolic Panel; Future  -     Hemoglobin A1c; Future  -     Comprehensive Metabolic Panel  -     Hemoglobin A1c    4. Acute non-recurrent frontal sinusitis  -     amoxicillin-clavulanate (Augmentin) 875-125 MG per tablet; Take 1 tablet by mouth 2 (Two) Times a Day.  Dispense: 20 tablet; Refill: 0  -     methylPREDNISolone (MEDROL) 4 MG dose pack; Take as directed on package instructions.  Dispense: 21 tablet; Refill: 0    5. Chronic midline low back pain without sciatica  -     Ambulatory Referral to Physical Therapy  -     methylPREDNISolone (MEDROL) 4 MG dose pack; Take as directed on package instructions.  Dispense: 21 tablet; Refill: 0        Return to Office    The patient was instructed to return for follow-up in 3 months. The patient was instructed to return sooner if the condition changes, worsens, or does not resolve.

## 2022-09-16 DIAGNOSIS — I10 BENIGN ESSENTIAL HYPERTENSION: ICD-10-CM

## 2022-09-16 RX ORDER — PANTOPRAZOLE SODIUM 40 MG/1
TABLET, DELAYED RELEASE ORAL
Qty: 90 TABLET | Refills: 1 | Status: SHIPPED | OUTPATIENT
Start: 2022-09-16 | End: 2022-09-20

## 2022-09-16 RX ORDER — LOSARTAN POTASSIUM AND HYDROCHLOROTHIAZIDE 25; 100 MG/1; MG/1
TABLET ORAL
Qty: 90 TABLET | Refills: 1 | Status: SHIPPED | OUTPATIENT
Start: 2022-09-16 | End: 2022-09-19

## 2022-09-16 NOTE — TELEPHONE ENCOUNTER
Rx Refill Note  Requested Prescriptions     Pending Prescriptions Disp Refills   • pantoprazole (PROTONIX) 40 MG EC tablet [Pharmacy Med Name: PANTOPRAZOLE SOD DR 40 MG TAB] 90 tablet 1     Sig: TAKE ONE TABLET BY MOUTH DAILY   • losartan-hydrochlorothiazide (HYZAAR) 100-25 MG per tablet [Pharmacy Med Name: LOSARTAN-HCTZ 100-25 MG TAB] 90 tablet 1     Sig: TAKE ONE TABLET BY MOUTH DAILY      Last office visit with prescribing clinician: 7/21/2022      Next office visit with prescribing clinician: 10/18/2022            Solange Marsh MA  09/16/22, 15:06 EDT

## 2022-09-19 DIAGNOSIS — I10 BENIGN ESSENTIAL HYPERTENSION: ICD-10-CM

## 2022-09-19 RX ORDER — LOSARTAN POTASSIUM AND HYDROCHLOROTHIAZIDE 25; 100 MG/1; MG/1
TABLET ORAL
Qty: 90 TABLET | Refills: 1 | Status: SHIPPED | OUTPATIENT
Start: 2022-09-19

## 2022-09-20 RX ORDER — PANTOPRAZOLE SODIUM 40 MG/1
TABLET, DELAYED RELEASE ORAL
Qty: 90 TABLET | Refills: 1 | Status: SHIPPED | OUTPATIENT
Start: 2022-09-20

## 2022-10-13 DIAGNOSIS — K21.9 GASTROESOPHAGEAL REFLUX DISEASE WITHOUT ESOPHAGITIS: ICD-10-CM

## 2022-10-13 RX ORDER — FAMOTIDINE 40 MG/1
40 TABLET, FILM COATED ORAL DAILY
Qty: 90 TABLET | Refills: 1 | Status: SHIPPED | OUTPATIENT
Start: 2022-10-13

## 2022-10-18 ENCOUNTER — OFFICE VISIT (OUTPATIENT)
Dept: INTERNAL MEDICINE | Facility: CLINIC | Age: 67
End: 2022-10-18

## 2022-10-18 VITALS
TEMPERATURE: 97.5 F | HEIGHT: 60 IN | OXYGEN SATURATION: 98 % | HEART RATE: 64 BPM | WEIGHT: 130.6 LBS | RESPIRATION RATE: 16 BRPM | BODY MASS INDEX: 25.64 KG/M2 | SYSTOLIC BLOOD PRESSURE: 124 MMHG | DIASTOLIC BLOOD PRESSURE: 72 MMHG

## 2022-10-18 DIAGNOSIS — K21.9 GASTROESOPHAGEAL REFLUX DISEASE WITHOUT ESOPHAGITIS: ICD-10-CM

## 2022-10-18 DIAGNOSIS — I10 ESSENTIAL HYPERTENSION: Primary | ICD-10-CM

## 2022-10-18 PROCEDURE — 99213 OFFICE O/P EST LOW 20 MIN: CPT | Performed by: INTERNAL MEDICINE

## 2022-10-18 NOTE — PROGRESS NOTES
Chief Complaint   Patient presents with   • Hypertension     3 month follow up       History of Present Illness      The patient presents for a follow-up related to hypertension. The patient reports that she has had no headaches, chest pain, dyspnea, edema, syncope, blurred vision or palpitations. She states that she is taking her medication as prescribed. She is not having medication side effects.    The patient presents for a follow-up related to GERD. The patient is on famotidine and pantoprazole for her gastroesophageal reflux. The medication is taken on a regular basis and gives complete relief of the symptoms. She reports no abdominal pain, belching, diarrhea, dysphagia, early satiety, heartburn, hoarseness, nausea, odynophagia, rectal bleeding, vomiting or weight loss. The GERD has no known aggravating factors.    Review of Systems    CONSTITUTIONAL- Denies Fever, Chills, Sweats, Fatigue, Weakness or Malaise.    HENT- Denies Nasal Discharge, Sore Throat, Ear Pain, Ear Drainage, Sinus Pain, Nasal Congestion, Decreased Hearing or Tinnitus.    GASTROINTESTINAL- Denies Constipation.    PULMONARY- Denies Wheezing, Sputum Production, Cough, Hemoptysis or Pleuritic Chest Pain.    CARDIOVASCULAR- Denies Claudication or Irregular Heart Beat.    Medications      Current Outpatient Medications:   •  estradiol (ESTRACE) 0.5 MG tablet, Take 1 tablet by mouth Daily., Disp: , Rfl:   •  famotidine (PEPCID) 40 MG tablet, Take 1 tablet by mouth Daily., Disp: 90 tablet, Rfl: 1  •  losartan-hydrochlorothiazide (HYZAAR) 100-25 MG per tablet, TAKE ONE TABLET BY MOUTH DAILY, Disp: 90 tablet, Rfl: 1  •  medroxyPROGESTERone (PROVERA) 5 MG tablet, Take 5 mg by mouth Daily., Disp: , Rfl:   •  pantoprazole (PROTONIX) 40 MG EC tablet, TAKE ONE TABLET BY MOUTH DAILY, Disp: 90 tablet, Rfl: 1  •  Triamcinolone Acetonide (NASACORT) 55 MCG/ACT nasal inhaler, 2 sprays into the nostril(s) as directed by provider Daily As Needed., Disp: , Rfl:  "     Allergies    No Known Allergies    Problem List    Patient Active Problem List   Diagnosis   • Essential hypertension   • Hearing loss   • Benign paroxysmal positional vertigo   • Cholecystitis   • GERD (gastroesophageal reflux disease)   • Calculus of gallbladder with acute cholecystitis without obstruction   • Acute cholecystitis       Medications, Allergies, Problems List and Past History were reviewed and updated.    Physical Examination    /72   Pulse 64   Temp 97.5 °F (36.4 °C) (Infrared)   Resp 16   Ht 152.4 cm (60\")   Wt 59.2 kg (130 lb 9.6 oz)   LMP  (LMP Unknown) Comment: LAST PAP 5/2020 BY DR DUARTE  SpO2 98%   BMI 25.51 kg/m²     HEENT: Facies- Within normal limits. Lids- Normal bilaterally. Conjunctiva- Clear bilaterally. Sclera- Anicteric bilaterally.    Neck: Thyroid- non enlarged, symmetric and has no nodules. No bruits are detected.    Lungs: Auscultation- Clear to auscultation bilaterally. There are no retractions, clubbing or cyanosis. The Expiratory to Inspiratory ratio is equal.    Cardiovascular: There are no carotid bruits. Heart- Normal Rate with Regular rhythm and no murmurs. There are no gallops. There are no rubs. In the lower extremities there is no edema. The upper extremities do not have edema.    Abdomen: Soft, benign, non-tender with no masses, hernias, organomegaly or scars.    Impression and Assessment    Essential Hypertension.    Gastroesophageal Reflux Disease.    Plan    Gastroesophageal Reflux Disease Plan: The patient was instructed to continue the current medications.    Essential Hypertension Plan: The patient was instructed to continue the current medications.    Return to Office     The patient was instructed to return for follow-up in 4 months. The patient was instructed to return sooner if the condition changes, worsens, or does not resolve.  "

## 2023-02-07 ENCOUNTER — OFFICE VISIT (OUTPATIENT)
Dept: INTERNAL MEDICINE | Facility: CLINIC | Age: 68
End: 2023-02-07
Payer: MEDICARE

## 2023-02-07 VITALS
WEIGHT: 130 LBS | OXYGEN SATURATION: 99 % | TEMPERATURE: 98.7 F | DIASTOLIC BLOOD PRESSURE: 88 MMHG | SYSTOLIC BLOOD PRESSURE: 134 MMHG | HEART RATE: 78 BPM | RESPIRATION RATE: 18 BRPM | BODY MASS INDEX: 25.39 KG/M2

## 2023-02-07 DIAGNOSIS — A08.4 VIRAL GASTROENTERITIS: ICD-10-CM

## 2023-02-07 DIAGNOSIS — U07.1 COVID-19 VIRUS INFECTION: Primary | ICD-10-CM

## 2023-02-07 LAB
ALBUMIN SERPL-MCNC: 4.6 G/DL (ref 3.5–5.2)
ALBUMIN/GLOB SERPL: 1.8 G/DL
ALP SERPL-CCNC: 77 U/L (ref 39–117)
ALT SERPL W P-5'-P-CCNC: 20 U/L (ref 1–33)
ANION GAP SERPL CALCULATED.3IONS-SCNC: 9 MMOL/L (ref 5–15)
AST SERPL-CCNC: 21 U/L (ref 1–32)
BILIRUB SERPL-MCNC: 0.6 MG/DL (ref 0–1.2)
BUN SERPL-MCNC: 15 MG/DL (ref 8–23)
BUN/CREAT SERPL: 25.9 (ref 7–25)
CALCIUM SPEC-SCNC: 9.9 MG/DL (ref 8.6–10.5)
CHLORIDE SERPL-SCNC: 94 MMOL/L (ref 98–107)
CO2 SERPL-SCNC: 30 MMOL/L (ref 22–29)
CREAT SERPL-MCNC: 0.58 MG/DL (ref 0.57–1)
EGFRCR SERPLBLD CKD-EPI 2021: 99.9 ML/MIN/1.73
EXPIRATION DATE: NORMAL
FLUAV AG UPPER RESP QL IA.RAPID: NOT DETECTED
FLUBV AG UPPER RESP QL IA.RAPID: NOT DETECTED
GLOBULIN UR ELPH-MCNC: 2.5 GM/DL
GLUCOSE SERPL-MCNC: 98 MG/DL (ref 65–99)
INTERNAL CONTROL: NORMAL
Lab: NORMAL
POTASSIUM SERPL-SCNC: 3.6 MMOL/L (ref 3.5–5.2)
PROT SERPL-MCNC: 7.1 G/DL (ref 6–8.5)
SARS-COV-2 AG UPPER RESP QL IA.RAPID: NOT DETECTED
SODIUM SERPL-SCNC: 133 MMOL/L (ref 136–145)

## 2023-02-07 PROCEDURE — 87428 SARSCOV & INF VIR A&B AG IA: CPT | Performed by: STUDENT IN AN ORGANIZED HEALTH CARE EDUCATION/TRAINING PROGRAM

## 2023-02-07 PROCEDURE — 99214 OFFICE O/P EST MOD 30 MIN: CPT | Performed by: STUDENT IN AN ORGANIZED HEALTH CARE EDUCATION/TRAINING PROGRAM

## 2023-02-07 PROCEDURE — 80053 COMPREHEN METABOLIC PANEL: CPT | Performed by: STUDENT IN AN ORGANIZED HEALTH CARE EDUCATION/TRAINING PROGRAM

## 2023-02-07 NOTE — PROGRESS NOTES
"    Acute Office Visit      Date: 2023   Patient Name: Helga Cardenas  : 1955   MRN: [id number]     Chief Complaint:    Chief Complaint   Patient presents with   • Diarrhea     Nausea  Light eating       History of Present Illness: Helga Cardenas is a 67 y.o. female.      The patient reports she is recovering from COVID-19. She was exposed to another person who tested for COVID-19 on 2023. Her cough has resolved. She denies any fever. Towards the end she had fatigue. She notes having diarrhea 1-2 days after taking guaifenesin 1200 mg, which was about 1 week ago. She states morning and night they were going together.  She tried to do \"7 and 7\" because they were 12 hours apart. After that she had bouts of diarrhea. She was eating toast, crackers, and pretzels. She states the diarrhea got better over the weekend and then it started again last night and it has been severe since. She is no longer taking guaifenesin, only her prescribed medications. She has about 8-10 episodes of diarrhea during the day. She drinks a lot of water. She denies taking any antibiotics recently.     She has flank pain. Exacerbating factors include carrying a 3-year-old, which has been trying to avoid, and walking up and down stairs. She denies any dysuria, hematuria or hematochezia. She denies abdominal pain other than cramping from the diarrhea.  She denies taking any medication for the pain.    She had a cholecystecomy in 2022.       Subjective      Review of Systems:   Review of Systems   Constitutional: Negative for activity change, appetite change, fatigue and fever.   HENT: Positive for congestion and rhinorrhea.    Eyes: Negative for blurred vision, photophobia and visual disturbance.   Respiratory: Negative for cough, chest tightness and shortness of breath.    Cardiovascular: Negative for chest pain and palpitations.   Gastrointestinal: Positive for diarrhea. Negative for abdominal " distention, abdominal pain, blood in stool, constipation, nausea and vomiting.   Genitourinary: Negative for dysuria and hematuria.   Musculoskeletal: Positive for back pain. Negative for arthralgias and joint swelling.   Skin: Negative for rash and wound.   Neurological: Negative for weakness, headache and confusion.       I have reviewed the patients family history, social history, past medical history, past surgical history and have updated it as appropriate.     Medications:     Current Outpatient Medications:   •  estradiol (ESTRACE) 0.5 MG tablet, Take 1 tablet by mouth Daily., Disp: , Rfl:   •  famotidine (PEPCID) 40 MG tablet, Take 1 tablet by mouth Daily., Disp: 90 tablet, Rfl: 1  •  losartan-hydrochlorothiazide (HYZAAR) 100-25 MG per tablet, TAKE ONE TABLET BY MOUTH DAILY, Disp: 90 tablet, Rfl: 1  •  medroxyPROGESTERone (PROVERA) 5 MG tablet, Take 5 mg by mouth Daily., Disp: , Rfl:   •  pantoprazole (PROTONIX) 40 MG EC tablet, TAKE ONE TABLET BY MOUTH DAILY, Disp: 90 tablet, Rfl: 1  •  Triamcinolone Acetonide (NASACORT) 55 MCG/ACT nasal inhaler, 2 sprays into the nostril(s) as directed by provider Daily As Needed., Disp: , Rfl:     Allergies:   No Known Allergies    Objective     Physical Exam: Please see above  Vital Signs:   Vitals:    02/07/23 0944   BP: 134/88   BP Location: Right arm   Patient Position: Sitting   Cuff Size: Adult   Pulse: 78   Resp: 18   Temp: 98.7 °F (37.1 °C)   TempSrc: Temporal   SpO2: 99%   Weight: 59 kg (130 lb)   PainSc:   4     Body mass index is 25.39 kg/m².    Physical Exam  Vitals and nursing note reviewed.   Constitutional:       General: She is not in acute distress.     Appearance: Normal appearance. She is normal weight. She is not ill-appearing or toxic-appearing.   HENT:      Nose: No congestion or rhinorrhea.   Eyes:      General:         Right eye: No discharge.         Left eye: No discharge.      Conjunctiva/sclera: Conjunctivae normal.   Cardiovascular:      Rate  and Rhythm: Normal rate and regular rhythm.      Heart sounds: Normal heart sounds. No murmur heard.    No friction rub.   Pulmonary:      Effort: Pulmonary effort is normal. No respiratory distress.      Breath sounds: Normal breath sounds. No wheezing or rhonchi.   Abdominal:      General: Abdomen is flat. Bowel sounds are normal. There is no distension.      Palpations: Abdomen is soft. There is no mass.      Tenderness: There is no abdominal tenderness. There is no guarding or rebound.   Musculoskeletal:      Cervical back: Normal range of motion.      Right lower leg: No edema.      Left lower leg: No edema.   Skin:     Findings: No lesion or rash.   Neurological:      General: No focal deficit present.      Mental Status: She is alert. Mental status is at baseline.      Coordination: Coordination normal.      Gait: Gait normal.   Psychiatric:         Mood and Affect: Mood normal.         Behavior: Behavior normal.         Thought Content: Thought content normal.         Judgment: Judgment normal.         Procedures    Results:   Labs:   Hemoglobin A1C   Date Value Ref Range Status   07/21/2022 6.40 (H) 4.80 - 5.60 % Final     TSH   Date Value Ref Range Status   04/06/2021 2.060 0.270 - 4.200 uIU/mL Final        Imaging:   No valid procedures specified.     Assessment / Plan      Assessment/Plan:   Problem List Items Addressed This Visit        Other    Viral gastroenteritis    Current Assessment & Plan     Maintain fluids and electrolytes.  Avoid any over-the-counter medications.          Relevant Orders    Clostridioides difficile Toxin, PCR - Stool, Per Rectum    Comprehensive Metabolic Panel (Completed)    Covid-19 + Flu A&B AG, Veritor (Completed)    COVID-19 virus infection - Primary    Relevant Orders    Covid-19 + Flu A&B AG, Veritor (Completed)           Follow Up:   Return in about 16 days (around 2/23/2023) for Medicare Wellness, Next scheduled follow up.        Jose Herbert MD  [facility] [place]  [facility]

## 2023-02-09 ENCOUNTER — LAB (OUTPATIENT)
Dept: INTERNAL MEDICINE | Facility: CLINIC | Age: 68
End: 2023-02-09
Payer: MEDICARE

## 2023-02-09 DIAGNOSIS — A08.4 VIRAL GASTROENTERITIS: ICD-10-CM

## 2023-02-09 LAB — C DIFF TOX GENS STL QL NAA+PROBE: NOT DETECTED

## 2023-02-09 PROCEDURE — 87493 C DIFF AMPLIFIED PROBE: CPT | Performed by: STUDENT IN AN ORGANIZED HEALTH CARE EDUCATION/TRAINING PROGRAM

## 2023-02-23 ENCOUNTER — OFFICE VISIT (OUTPATIENT)
Dept: INTERNAL MEDICINE | Facility: CLINIC | Age: 68
End: 2023-02-23
Payer: MEDICARE

## 2023-02-23 VITALS
HEART RATE: 68 BPM | WEIGHT: 131 LBS | TEMPERATURE: 97.8 F | RESPIRATION RATE: 16 BRPM | DIASTOLIC BLOOD PRESSURE: 78 MMHG | BODY MASS INDEX: 25.58 KG/M2 | SYSTOLIC BLOOD PRESSURE: 138 MMHG

## 2023-02-23 DIAGNOSIS — R10.13 EPIGASTRIC ABDOMINAL PAIN: ICD-10-CM

## 2023-02-23 DIAGNOSIS — I10 ESSENTIAL HYPERTENSION: Primary | ICD-10-CM

## 2023-02-23 DIAGNOSIS — R73.02 IMPAIRED GLUCOSE TOLERANCE: ICD-10-CM

## 2023-02-23 DIAGNOSIS — K21.9 GASTROESOPHAGEAL REFLUX DISEASE WITHOUT ESOPHAGITIS: ICD-10-CM

## 2023-02-23 LAB
AMYLASE SERPL-CCNC: 59 U/L (ref 28–100)
LIPASE SERPL-CCNC: 21 U/L (ref 13–60)

## 2023-02-23 PROCEDURE — 85025 COMPLETE CBC W/AUTO DIFF WBC: CPT | Performed by: INTERNAL MEDICINE

## 2023-02-23 PROCEDURE — 83036 HEMOGLOBIN GLYCOSYLATED A1C: CPT | Performed by: INTERNAL MEDICINE

## 2023-02-23 PROCEDURE — 82150 ASSAY OF AMYLASE: CPT | Performed by: INTERNAL MEDICINE

## 2023-02-23 PROCEDURE — 80053 COMPREHEN METABOLIC PANEL: CPT | Performed by: INTERNAL MEDICINE

## 2023-02-23 PROCEDURE — 36415 COLL VENOUS BLD VENIPUNCTURE: CPT | Performed by: INTERNAL MEDICINE

## 2023-02-23 PROCEDURE — 99214 OFFICE O/P EST MOD 30 MIN: CPT | Performed by: INTERNAL MEDICINE

## 2023-02-23 PROCEDURE — 83690 ASSAY OF LIPASE: CPT | Performed by: INTERNAL MEDICINE

## 2023-02-23 RX ORDER — MULTIPLE VITAMINS W/ MINERALS TAB 9MG-400MCG
1 TAB ORAL DAILY
COMMUNITY

## 2023-02-23 NOTE — PROGRESS NOTES
Chief Complaint   Patient presents with   • Follow-up     3 month follow up chronic medical problems       History of Present Illness    The patient presents for a follow-up related to hypertension. The patient reports that she has had no headaches, chest pain, dyspnea, edema, syncope, blurred vision or palpitations. She states that she is taking her medication as prescribed. She is not having medication side effects.    The patient presents for an acute visit for epigastric abdominal pain. There is a one month history of abdominal pain. The pain is constant. The pain is in the epigastric area and it does not radiate. The pain is characterized as knife like, sharp, a squeezing sensation, stabbing and tightness. The patient has not had a fever. The patient reports no aggravating factors.  The patient has noted no alleviating factors. The patient also denies hematuria, dysuria, nausea, vomiting, diarrhea, constipation, a rash, rectal bleeding, urinary hesitancy or urinary frequency.    The patient presents for a follow-up related to impaired glucose tolerance. She does not check her blood sugars at home. She denies hypoglycemic symptoms. The patient denies polyuria, polydypsia or polyphagia. She reports no symptoms of neuropathy. The patient is not on medication for her impaired glucose tolerance. The patient does check her feet daily at home. She denies orthopnea, paroxysmal nocturnal dyspnea or dyspnea on exertion.    The patient presents for a follow-up related to GERD. The patient is on pantoprazole and Pepcid for her gastroesophageal reflux. The medication is taken on a regular basis and gives complete relief of the symptoms. She reports abdominal pain but she denies belching, dysphagia, early satiety, heartburn, hoarseness or odynophagia. The GERD has no known aggravating factors.    Medications      Current Outpatient Medications:   •  estradiol (ESTRACE) 0.5 MG tablet, Take 1 tablet by mouth Daily., Disp: ,  Rfl:   •  famotidine (PEPCID) 40 MG tablet, Take 1 tablet by mouth Daily., Disp: 90 tablet, Rfl: 1  •  losartan-hydrochlorothiazide (HYZAAR) 100-25 MG per tablet, TAKE ONE TABLET BY MOUTH DAILY, Disp: 90 tablet, Rfl: 1  •  medroxyPROGESTERone (PROVERA) 5 MG tablet, Take 5 mg by mouth Daily., Disp: , Rfl:   •  multivitamin with minerals (MULTIVITAMIN ADULTS PO), Take 1 tablet by mouth Daily., Disp: , Rfl:   •  pantoprazole (PROTONIX) 40 MG EC tablet, TAKE ONE TABLET BY MOUTH DAILY, Disp: 90 tablet, Rfl: 1  •  Triamcinolone Acetonide (NASACORT) 55 MCG/ACT nasal inhaler, 2 sprays into the nostril(s) as directed by provider Daily As Needed., Disp: , Rfl:      Allergies    No Known Allergies    Problem List    Patient Active Problem List   Diagnosis   • Essential hypertension   • Hearing loss   • Benign paroxysmal positional vertigo   • Cholecystitis   • GERD (gastroesophageal reflux disease)   • Calculus of gallbladder with acute cholecystitis without obstruction   • Acute cholecystitis   • Viral gastroenteritis   • COVID-19 virus infection       Medications, Allergies, Problems List and Past History were reviewed and updated.    Physical Examination    /78   Pulse 68   Temp 97.8 °F (36.6 °C) (Infrared)   Resp 16   Wt 59.4 kg (131 lb)   LMP  (LMP Unknown) Comment: LAST PAP 5/2020 BY DR DUARTE  BMI 25.58 kg/m²     HEENT: Facies- Within normal limits. Lids- Normal bilaterally. Conjunctiva- Clear bilaterally. Sclera- Anicteric bilaterally.    Neck: Thyroid- non enlarged, symmetric and has no nodules. No bruits are detected.    Lungs: Auscultation- Clear to auscultation bilaterally. There are no retractions, clubbing or cyanosis. The Expiratory to Inspiratory ratio is equal.    Cardiovascular: There are no carotid bruits. Heart- Normal Rate with Regular rhythm and no murmurs. There are no gallops. There are no rubs. In the lower extremities there is no edema. The upper extremities do not have edema.    Abdomen:  Soft, benign, non-tender with no masses, hernias, organomegaly or scars.    Impression and Assessment    Essential Hypertension.    Epigastric Abdominal Pain    Impaired Glucose Tolerance.    Gastroesophageal Reflux Disease.    Plan    Epigastric Abdominal Pain Plan: The patient was instructed to continue the current medications. Further plans will be made after testing.    Gastroesophageal Reflux Disease Plan: The patient was instructed to continue the current medications.    Essential Hypertension Plan: The patient was instructed to continue the current medications.    Impaired Glucose Tolerance Plan: The patient was instructed to continue the current medications.    Diagnoses and all orders for this visit:    1. Essential hypertension (Primary)  -     CBC & Differential; Future  -     Comprehensive Metabolic Panel; Future    2. Gastroesophageal reflux disease without esophagitis  -     Ambulatory referral for Screening EGD    3. Impaired glucose tolerance  -     Comprehensive Metabolic Panel; Future  -     Hemoglobin A1c; Future    4. Epigastric abdominal pain  -     CBC & Differential; Future  -     Comprehensive Metabolic Panel; Future  -     Amylase; Future  -     Lipase; Future  -     Ambulatory referral for Screening EGD          Return to Office    The patient was instructed to return for follow-up in 6 months. The patient was instructed to return sooner if the condition changes, worsens, or does not resolve.

## 2023-02-24 LAB
ALBUMIN SERPL-MCNC: 4.3 G/DL (ref 3.5–5.2)
ALBUMIN/GLOB SERPL: 1.4 G/DL
ALP SERPL-CCNC: 93 U/L (ref 39–117)
ALT SERPL W P-5'-P-CCNC: 17 U/L (ref 1–33)
ANION GAP SERPL CALCULATED.3IONS-SCNC: 8.3 MMOL/L (ref 5–15)
AST SERPL-CCNC: 21 U/L (ref 1–32)
BASOPHILS # BLD AUTO: 0.04 10*3/MM3 (ref 0–0.2)
BASOPHILS NFR BLD AUTO: 0.6 % (ref 0–1.5)
BILIRUB SERPL-MCNC: 0.3 MG/DL (ref 0–1.2)
BUN SERPL-MCNC: 13 MG/DL (ref 8–23)
BUN/CREAT SERPL: 24.1 (ref 7–25)
CALCIUM SPEC-SCNC: 9.4 MG/DL (ref 8.6–10.5)
CHLORIDE SERPL-SCNC: 94 MMOL/L (ref 98–107)
CO2 SERPL-SCNC: 30.7 MMOL/L (ref 22–29)
CREAT SERPL-MCNC: 0.54 MG/DL (ref 0.57–1)
DEPRECATED RDW RBC AUTO: 42.6 FL (ref 37–54)
EGFRCR SERPLBLD CKD-EPI 2021: 101.7 ML/MIN/1.73
EOSINOPHIL # BLD AUTO: 0.28 10*3/MM3 (ref 0–0.4)
EOSINOPHIL NFR BLD AUTO: 4.5 % (ref 0.3–6.2)
ERYTHROCYTE [DISTWIDTH] IN BLOOD BY AUTOMATED COUNT: 12.4 % (ref 12.3–15.4)
GLOBULIN UR ELPH-MCNC: 3 GM/DL
GLUCOSE SERPL-MCNC: 105 MG/DL (ref 65–99)
HBA1C MFR BLD: 6.3 % (ref 4.8–5.6)
HCT VFR BLD AUTO: 39 % (ref 34–46.6)
HGB BLD-MCNC: 13.3 G/DL (ref 12–15.9)
IMM GRANULOCYTES # BLD AUTO: 0.05 10*3/MM3 (ref 0–0.05)
IMM GRANULOCYTES NFR BLD AUTO: 0.8 % (ref 0–0.5)
LYMPHOCYTES # BLD AUTO: 1.29 10*3/MM3 (ref 0.7–3.1)
LYMPHOCYTES NFR BLD AUTO: 20.6 % (ref 19.6–45.3)
MCH RBC QN AUTO: 31.5 PG (ref 26.6–33)
MCHC RBC AUTO-ENTMCNC: 34.1 G/DL (ref 31.5–35.7)
MCV RBC AUTO: 92.4 FL (ref 79–97)
MONOCYTES # BLD AUTO: 0.6 10*3/MM3 (ref 0.1–0.9)
MONOCYTES NFR BLD AUTO: 9.6 % (ref 5–12)
NEUTROPHILS NFR BLD AUTO: 3.99 10*3/MM3 (ref 1.7–7)
NEUTROPHILS NFR BLD AUTO: 63.9 % (ref 42.7–76)
NRBC BLD AUTO-RTO: 0 /100 WBC (ref 0–0.2)
PLATELET # BLD AUTO: 368 10*3/MM3 (ref 140–450)
PMV BLD AUTO: 8.9 FL (ref 6–12)
POTASSIUM SERPL-SCNC: 3.7 MMOL/L (ref 3.5–5.2)
PROT SERPL-MCNC: 7.3 G/DL (ref 6–8.5)
RBC # BLD AUTO: 4.22 10*6/MM3 (ref 3.77–5.28)
SODIUM SERPL-SCNC: 133 MMOL/L (ref 136–145)
WBC NRBC COR # BLD: 6.25 10*3/MM3 (ref 3.4–10.8)

## 2023-05-24 ENCOUNTER — OFFICE VISIT (OUTPATIENT)
Dept: INTERNAL MEDICINE | Facility: CLINIC | Age: 68
End: 2023-05-24
Payer: MEDICARE

## 2023-05-24 VITALS
DIASTOLIC BLOOD PRESSURE: 64 MMHG | WEIGHT: 134 LBS | TEMPERATURE: 97.5 F | HEART RATE: 64 BPM | BODY MASS INDEX: 26.17 KG/M2 | RESPIRATION RATE: 16 BRPM | SYSTOLIC BLOOD PRESSURE: 116 MMHG

## 2023-05-24 DIAGNOSIS — K21.00 GASTROESOPHAGEAL REFLUX DISEASE WITH ESOPHAGITIS WITHOUT HEMORRHAGE: ICD-10-CM

## 2023-05-24 DIAGNOSIS — M85.89 OSTEOPENIA OF MULTIPLE SITES: ICD-10-CM

## 2023-05-24 DIAGNOSIS — R10.13 EPIGASTRIC PAIN: Primary | ICD-10-CM

## 2023-05-24 PROBLEM — U07.1 COVID-19 VIRUS INFECTION: Status: RESOLVED | Noted: 2023-02-07 | Resolved: 2023-05-24

## 2023-05-24 PROBLEM — A08.4 VIRAL GASTROENTERITIS: Status: RESOLVED | Noted: 2023-02-07 | Resolved: 2023-05-24

## 2023-05-24 PROBLEM — K80.00 CALCULUS OF GALLBLADDER WITH ACUTE CHOLECYSTITIS WITHOUT OBSTRUCTION: Status: RESOLVED | Noted: 2022-03-11 | Resolved: 2023-05-24

## 2023-05-24 PROBLEM — J30.9 CHRONIC ALLERGIC RHINITIS: Status: ACTIVE | Noted: 2023-05-24

## 2023-05-24 PROBLEM — K81.0 ACUTE CHOLECYSTITIS: Status: RESOLVED | Noted: 2022-03-12 | Resolved: 2023-05-24

## 2023-05-24 PROBLEM — K81.9 CHOLECYSTITIS: Status: RESOLVED | Noted: 2022-03-11 | Resolved: 2023-05-24

## 2023-05-24 PROCEDURE — 3078F DIAST BP <80 MM HG: CPT | Performed by: INTERNAL MEDICINE

## 2023-05-24 PROCEDURE — 99214 OFFICE O/P EST MOD 30 MIN: CPT | Performed by: INTERNAL MEDICINE

## 2023-05-24 PROCEDURE — 1159F MED LIST DOCD IN RCRD: CPT | Performed by: INTERNAL MEDICINE

## 2023-05-24 PROCEDURE — 1160F RVW MEDS BY RX/DR IN RCRD: CPT | Performed by: INTERNAL MEDICINE

## 2023-05-24 PROCEDURE — 3074F SYST BP LT 130 MM HG: CPT | Performed by: INTERNAL MEDICINE

## 2023-05-24 NOTE — PROGRESS NOTES
Med-Peds Progress Note     Subjective    Chief Complaint: follow up.   Independent Historian(s): Patient.    Used: N/A.     HPI   Helga has persistent epigastric pain following an cholecystectomy 1 year ago. It is a dull burning moderate-severe pain that occurs randomly. More often in mid afternoons to the evenings. No direct association with food. Her antacids help the pain. She takes those medications as prescribed. She does have heartburn. She has intermittent nausea with NBNB emesis. The pain has not changed since we last saw her. No dysphagia, odynophagia, diarrhea, constipation, melena, appetite loss, weight loss, or hematochezia. She is scheduled for her endoscopy tomorrow.    ROS  Refer to HPI above for pertinent positives and negatives.     The following components of the patient's history have been personally reviewed and updated where appropriate: past medical history, allergies, surgical history, social history, family history, and medications. Pertinent findings or changes can be found in the HPI or A/P in this note.        Objective     /64   Pulse 64   Temp 97.5 °F (36.4 °C) (Infrared)   Resp 16   Wt 60.8 kg (134 lb)   LMP  (LMP Unknown) Comment: LAST PAP 5/2020 BY DR DUARTE  BMI 26.17 kg/m²       Physical Exam  General: no acute distress, overweight  Neuro: awake, alert, moves all 4 extremities   Psych: euthymic, full affect   HEENT: moist mucous membranes, conjunctivae moist and pink   Cardiac: audible S1 and S2, no murmur, RRR   Respiratory: equal chest rise and fall, lungs clear bilaterally   GI: soft, non-distended, normoactive BS, non tender  Msk: no lower extremity edema, normal tone   Skin: warm, dry        Assessment & Plan    Summary: Helga is a 67 y.o. female with GERD, HTN, and osteopenia here for routine follow up.    Persistent epigastric pain  Chronic GERD  Persistent. Uncontrolled. Unchanged. Pending evaluation.  PLAN   EGD scheduled for tomorrow.  EGD result  will inform our plans for additional management.  For now continue pantoprazole 40 mg PO daily.  For now continue famotidine 40 mg PO daily.  Would plan to move to a BID dosing schedule given break through symptoms in PM.    Hx osteopenia  Primary risk factor of post-menopausal state. Not taking vitamin D.  PLAN  Advised on weight training exercises 5 days a week.  Start daily vitamin D supplementation.  Ordered repeat DEXA scan.    Healthcare Maintenance   Denies smoking, alcohol, or other substance use.   Immunizations personally reviewed. Pt declined PCV20. Shingrix #2 and TDaP at pharmacy.  Pap smear and mammogram up to date. Done by gynecology.  Colonoscopy also up to date.  BMI is >= 25 and <30. (Overweight) The following options were offered after discussion;: weight loss educational material (shared in after visit summary) and exercise counseling/recommendations    Follow up in 6 months.          Gabby Kahler, MD  Med Peds PGY3    Attending Note:   Patient was personally seen and examined by me.  I have obtained and corroborated the history and examination as documented above, and agree with the accuracy of the documented information.  All orders were reviewed and personally signed by me.     Diagnoses and all orders for this visit:    1. Epigastric pain (Primary)    2. Gastroesophageal reflux disease with esophagitis without hemorrhage    3. Osteopenia of multiple sites  -     DEXA Bone Density Axial; Future

## 2023-05-25 ENCOUNTER — OUTSIDE FACILITY SERVICE (OUTPATIENT)
Dept: GASTROENTEROLOGY | Facility: CLINIC | Age: 68
End: 2023-05-25

## 2023-05-25 PROCEDURE — 43239 EGD BIOPSY SINGLE/MULTIPLE: CPT | Performed by: INTERNAL MEDICINE

## 2023-05-26 DIAGNOSIS — K21.9 GASTROESOPHAGEAL REFLUX DISEASE WITHOUT ESOPHAGITIS: ICD-10-CM

## 2023-05-26 RX ORDER — FAMOTIDINE 40 MG/1
TABLET, FILM COATED ORAL
Qty: 90 TABLET | Refills: 1 | Status: SHIPPED | OUTPATIENT
Start: 2023-05-26

## 2023-06-05 ENCOUNTER — TELEPHONE (OUTPATIENT)
Dept: INTERNAL MEDICINE | Facility: CLINIC | Age: 68
End: 2023-06-05
Payer: MEDICARE

## 2023-06-05 RX ORDER — PANTOPRAZOLE SODIUM 40 MG/1
TABLET, DELAYED RELEASE ORAL
Qty: 120 TABLET | Refills: 0 | Status: SHIPPED | OUTPATIENT
Start: 2023-06-05

## 2023-06-05 NOTE — TELEPHONE ENCOUNTER
"Spoke with patient, informed that Dr Desouza said   \"She has some mild stomach inflammation (gastritis).  I would recommend that she increase pantoprazole to 40 mg po BID x 30 days.\"  Verbalized good understanding agreement and appreciation.  Explained will send in new rx to cover dosage change at twice daily for the next 30 days and then back to once daily.  Agreed.     Rx sent via erx.   "

## 2023-06-05 NOTE — TELEPHONE ENCOUNTER
She has some mild stomach inflammation (gastritis).  I would recommend that she increase pantoprazole to 40 mg po BID x 30 days.

## 2023-08-11 ENCOUNTER — TRANSCRIBE ORDERS (OUTPATIENT)
Dept: ADMINISTRATIVE | Facility: HOSPITAL | Age: 68
End: 2023-08-11
Payer: MEDICARE

## 2023-08-11 DIAGNOSIS — Z12.31 VISIT FOR SCREENING MAMMOGRAM: Primary | ICD-10-CM

## 2023-08-18 ENCOUNTER — HOSPITAL ENCOUNTER (OUTPATIENT)
Dept: MAMMOGRAPHY | Facility: HOSPITAL | Age: 68
Discharge: HOME | End: 2023-08-18
Admitting: OBSTETRICS & GYNECOLOGY
Payer: MEDICARE

## 2023-08-18 DIAGNOSIS — I10 BENIGN ESSENTIAL HYPERTENSION: ICD-10-CM

## 2023-08-18 DIAGNOSIS — Z12.31 VISIT FOR SCREENING MAMMOGRAM: ICD-10-CM

## 2023-08-18 PROCEDURE — 77063 BREAST TOMOSYNTHESIS BI: CPT

## 2023-08-18 PROCEDURE — 77067 SCR MAMMO BI INCL CAD: CPT

## 2023-08-21 ENCOUNTER — TELEPHONE (OUTPATIENT)
Dept: INTERNAL MEDICINE | Facility: CLINIC | Age: 68
End: 2023-08-21
Payer: MEDICARE

## 2023-08-21 RX ORDER — LOSARTAN POTASSIUM AND HYDROCHLOROTHIAZIDE 25; 100 MG/1; MG/1
TABLET ORAL
Qty: 90 TABLET | Refills: 1 | Status: SHIPPED | OUTPATIENT
Start: 2023-08-21

## 2023-08-21 RX ORDER — PANTOPRAZOLE SODIUM 40 MG/1
TABLET, DELAYED RELEASE ORAL
Qty: 90 TABLET | Refills: 1 | Status: SHIPPED | OUTPATIENT
Start: 2023-08-21

## 2023-08-21 NOTE — TELEPHONE ENCOUNTER
Riley Cardenas, patient's , talked to Dr. Desouza about his wife's symptoms. Patient is still having stomach issues, nausea and vomiting. She has to be very careful about what she eats. Please call: [phone]

## 2023-08-22 NOTE — TELEPHONE ENCOUNTER
Please schedule appt with me to evaluate this further.  Sometime in the next few weeks is ok.  Rey Desouza MD  07:49 EDT  08/22/23

## 2023-08-24 ENCOUNTER — TELEPHONE (OUTPATIENT)
Dept: INTERNAL MEDICINE | Facility: CLINIC | Age: 68
End: 2023-08-24
Payer: MEDICARE

## 2023-08-24 RX ORDER — AZITHROMYCIN 250 MG/1
TABLET, FILM COATED ORAL
Qty: 6 TABLET | Refills: 0 | Status: SHIPPED | OUTPATIENT
Start: 2023-08-24

## 2023-08-24 RX ORDER — METHYLPREDNISOLONE 4 MG/1
TABLET ORAL
Qty: 21 TABLET | Refills: 0 | Status: SHIPPED | OUTPATIENT
Start: 2023-08-24

## 2023-08-24 NOTE — TELEPHONE ENCOUNTER
Caller: Helga Cardenas    Relationship: Self    Best call back number:      What medication are you requesting: DOSE PACK    What are your current symptoms: DRAINAGE , SORE THROAT    How long have you been experiencing symptoms: SINCE MONDAY [id number]    Have you had these symptoms before:    [] Yes  [x] No    Have you been treated for these symptoms before:   [] Yes  [x] No    If a prescription is needed, what is your preferred pharmacy and phone number: [facility] [id number] - [place], [place] - [address] - [phone]  - [phone]     Additional notes: PATIENT ADVISED SHE HAS TESTED TWICE FOR COVD AND BOTH TESTS WERE NEGATIVE; PATIENT ADVISED THAT ALSO DR HO HAS SPOKEN TO ALDEN HER SPOUSE

## 2023-08-24 NOTE — TELEPHONE ENCOUNTER
I have sent in a z-adele and a medrol dose pack.  If symptoms worsen, don't improve, develops a fever needs to be seen.     Rey Desouza MD  14:18 EDT  08/24/23

## 2023-08-24 NOTE — TELEPHONE ENCOUNTER
"Spoke with patient, informed that Dr Desouza said   \"I have sent in a z-adele and a medrol dose pack.  If symptoms worsen, don't improve, develops a fever needs to be seen.\"  Verbalized good understanding, agreement and great appreciation.    "

## 2023-08-29 ENCOUNTER — OFFICE VISIT (OUTPATIENT)
Dept: INTERNAL MEDICINE | Facility: CLINIC | Age: 68
End: 2023-08-29
Payer: MEDICARE

## 2023-08-29 VITALS
BODY MASS INDEX: 26.17 KG/M2 | WEIGHT: 134 LBS | TEMPERATURE: 99.1 F | HEART RATE: 72 BPM | SYSTOLIC BLOOD PRESSURE: 128 MMHG | DIASTOLIC BLOOD PRESSURE: 82 MMHG | RESPIRATION RATE: 16 BRPM

## 2023-08-29 DIAGNOSIS — F41.9 ANXIETY: ICD-10-CM

## 2023-08-29 DIAGNOSIS — R10.13 EPIGASTRIC ABDOMINAL PAIN: Primary | ICD-10-CM

## 2023-08-29 DIAGNOSIS — K21.00 GASTROESOPHAGEAL REFLUX DISEASE WITH ESOPHAGITIS WITHOUT HEMORRHAGE: ICD-10-CM

## 2023-08-29 LAB
ALBUMIN SERPL-MCNC: 4.8 G/DL (ref 3.5–5.2)
ALBUMIN/GLOB SERPL: 1.7 G/DL
ALP SERPL-CCNC: 110 U/L (ref 39–117)
ALT SERPL W P-5'-P-CCNC: 16 U/L (ref 1–33)
AMYLASE SERPL-CCNC: 68 U/L (ref 28–100)
ANION GAP SERPL CALCULATED.3IONS-SCNC: 14 MMOL/L (ref 5–15)
AST SERPL-CCNC: 17 U/L (ref 1–32)
BILIRUB BLD-MCNC: NEGATIVE MG/DL
BILIRUB SERPL-MCNC: 0.2 MG/DL (ref 0–1.2)
BUN SERPL-MCNC: 22 MG/DL (ref 8–23)
BUN/CREAT SERPL: 36.1 (ref 7–25)
CALCIUM SPEC-SCNC: 10.4 MG/DL (ref 8.6–10.5)
CHLORIDE SERPL-SCNC: 94 MMOL/L (ref 98–107)
CLARITY, POC: CLEAR
CO2 SERPL-SCNC: 29 MMOL/L (ref 22–29)
COLOR UR: YELLOW
CREAT SERPL-MCNC: 0.61 MG/DL (ref 0.57–1)
DEPRECATED RDW RBC AUTO: 41.4 FL (ref 37–54)
EGFRCR SERPLBLD CKD-EPI 2021: 98.1 ML/MIN/1.73
ERYTHROCYTE [DISTWIDTH] IN BLOOD BY AUTOMATED COUNT: 12.7 % (ref 12.3–15.4)
EXPIRATION DATE: NORMAL
GLOBULIN UR ELPH-MCNC: 2.9 GM/DL
GLUCOSE SERPL-MCNC: 107 MG/DL (ref 65–99)
GLUCOSE UR STRIP-MCNC: NEGATIVE MG/DL
HCT VFR BLD AUTO: 43 % (ref 34–46.6)
HGB BLD-MCNC: 14.4 G/DL (ref 12–15.9)
KETONES UR QL: NEGATIVE
LEUKOCYTE EST, POC: NEGATIVE
LIPASE SERPL-CCNC: 28 U/L (ref 13–60)
LYMPHOCYTES # BLD MANUAL: 2.62 10*3/MM3 (ref 0.7–3.1)
LYMPHOCYTES NFR BLD MANUAL: 10 % (ref 5–12)
Lab: NORMAL
MCH RBC QN AUTO: 30.3 PG (ref 26.6–33)
MCHC RBC AUTO-ENTMCNC: 33.5 G/DL (ref 31.5–35.7)
MCV RBC AUTO: 90.5 FL (ref 79–97)
MONOCYTES # BLD: 1.75 10*3/MM3 (ref 0.1–0.9)
NEUTROPHILS # BLD AUTO: 13.09 10*3/MM3 (ref 1.7–7)
NEUTROPHILS NFR BLD MANUAL: 75 % (ref 42.7–76)
NITRITE UR-MCNC: NEGATIVE MG/ML
NRBC BLD AUTO-RTO: 0 /100 WBC (ref 0–0.2)
PH UR: 7 [PH] (ref 5–8)
PLAT MORPH BLD: NORMAL
PLATELET # BLD AUTO: 426 10*3/MM3 (ref 140–450)
PMV BLD AUTO: 8.4 FL (ref 6–12)
POTASSIUM SERPL-SCNC: 4.5 MMOL/L (ref 3.5–5.2)
PROT SERPL-MCNC: 7.7 G/DL (ref 6–8.5)
PROT UR STRIP-MCNC: NEGATIVE MG/DL
RBC # BLD AUTO: 4.75 10*6/MM3 (ref 3.77–5.28)
RBC # UR STRIP: NEGATIVE /UL
RBC MORPH BLD: NORMAL
SODIUM SERPL-SCNC: 137 MMOL/L (ref 136–145)
SP GR UR: 1.01 (ref 1–1.03)
UROBILINOGEN UR QL: NORMAL
VARIANT LYMPHS NFR BLD MANUAL: 15 % (ref 19.6–45.3)
WBC MORPH BLD: NORMAL
WBC NRBC COR # BLD: 17.45 10*3/MM3 (ref 3.4–10.8)

## 2023-08-29 PROCEDURE — 86258 DGP ANTIBODY EACH IG CLASS: CPT | Performed by: INTERNAL MEDICINE

## 2023-08-29 PROCEDURE — 86231 EMA EACH IG CLASS: CPT | Performed by: INTERNAL MEDICINE

## 2023-08-29 PROCEDURE — 85007 BL SMEAR W/DIFF WBC COUNT: CPT | Performed by: INTERNAL MEDICINE

## 2023-08-29 PROCEDURE — 82784 ASSAY IGA/IGD/IGG/IGM EACH: CPT | Performed by: INTERNAL MEDICINE

## 2023-08-29 PROCEDURE — 82150 ASSAY OF AMYLASE: CPT | Performed by: INTERNAL MEDICINE

## 2023-08-29 PROCEDURE — 85025 COMPLETE CBC W/AUTO DIFF WBC: CPT | Performed by: INTERNAL MEDICINE

## 2023-08-29 PROCEDURE — 83690 ASSAY OF LIPASE: CPT | Performed by: INTERNAL MEDICINE

## 2023-08-29 PROCEDURE — 80053 COMPREHEN METABOLIC PANEL: CPT | Performed by: INTERNAL MEDICINE

## 2023-08-29 PROCEDURE — 86364 TISS TRNSGLTMNASE EA IG CLAS: CPT | Performed by: INTERNAL MEDICINE

## 2023-08-29 NOTE — PROGRESS NOTES
Chief Complaint   Patient presents with    Follow-up     Continues to have epigastric pain along with nausea and vomiting       History of Present Illness    The patient presents for a follow-up related to epigastric abdominal pain. There is a chronic history of abdominal pain. The pain is intermittent. The pain is in the epigastric area and it does not radiate. The pain is characterized as aching, a full feeling in the abdomen and pressure. The patient has not had a fever. The patient reports that anxiety aggravates the pain.  The patient has noted no alleviating factors. The patient also denies hematuria, dysuria, nausea, vomiting, diarrhea, constipation, a rash, rectal bleeding, urinary hesitancy or urinary frequency.    The patient presents for a follow-up related to GERD. The patient is on famotidine and pantoprazole for her gastroesophageal reflux. The medication is taken on a regular basis and gives complete relief of the symptoms. She reports abdominal pain but she denies belching, chest pain, dysphagia, early satiety, heartburn, hoarseness or odynophagia. The GERD has no known aggravating factors. The most recent EGD was in 2023. The EGD revealed duodenitis and gastritis.    The patient presents for a follow-up related to anxiety. She denies currently having anxiety symptoms. She is having panic attacks which are manifested by abdominal pain. Her energy level is good. She denies agorophobia. She sleeps well. She has never been on a medication for depression. She states that her current anxiety symptoms are worsened.    Medications      Current Outpatient Medications:     famotidine (PEPCID) 40 MG tablet, TAKE ONE TABLET BY MOUTH DAILY, Disp: 90 tablet, Rfl: 1    losartan-hydrochlorothiazide (HYZAAR) 100-25 MG per tablet, TAKE ONE TABLET BY MOUTH DAILY, Disp: 90 tablet, Rfl: 1    multivitamin with minerals tablet tablet, Take 1 tablet by mouth Daily., Disp: , Rfl:     pantoprazole (PROTONIX) 40 MG EC tablet,  TAKE ONE TABLET BY MOUTH DAILY (Patient taking differently: Take 1 tablet by mouth 2 (Two) Times a Day.), Disp: 90 tablet, Rfl: 1    Triamcinolone Acetonide (NASACORT) 55 MCG/ACT nasal inhaler, 2 sprays into the nostril(s) as directed by provider Daily As Needed., Disp: , Rfl:      Allergies    No Known Allergies    Problem List    Patient Active Problem List   Diagnosis    Essential hypertension    Benign paroxysmal positional vertigo    Gastroesophageal reflux disease with esophagitis without hemorrhage    Chronic allergic rhinitis    Osteopenia of multiple sites       Medications, Allergies, Problems List and Past History were reviewed and updated.    Physical Examination    /82 (BP Location: Right arm, Patient Position: Sitting, Cuff Size: Adult)   Pulse 72   Temp 99.1 øF (37.3 øC) (Infrared)   Resp 16   Wt 60.8 kg (134 lb)   LMP  (LMP Unknown) Comment: LAST PAP 5/2020 BY DR DUARTE  BMI 26.17 kg/mý       HEENT: Head- Normocephalic Atraumatic. Facies- Within normal limits. Pinnas- Normal texture and shape bilaterally. Canals- Normal bilaterally. TMs- Normal bilaterally. Nares- Patent bilaterally. Nasal Septum- is normal. There is no tenderness to palpation over the frontal or maxillary sinuses. Lids- Normal bilaterally. Conjunctiva- Clear bilaterally. Sclera- Anicteric bilaterally. Oropharynx- Moist with no lesions. Tonsils- No enlargement, erythema or exudate.    Neck: Thyroid- non enlarged, symmetric and has no nodules. No bruits are detected. ROM- Normal Range of Motion with no rigidity.    Lungs: Auscultation- Clear to auscultation bilaterally. There are no retractions, clubbing or cyanosis. The Expiratory to Inspiratory ratio is equal.    Cardiovascular: There are no carotid bruits. Heart- Normal Rate with Regular rhythm and no murmurs. There are no gallops. There are no rubs. In the lower extremities there is no edema. The upper extremities do not have edema.    Abdomen: Soft, benign, non-tender  with no masses, hernias, organomegaly or scars.    Impression and Assessment    Epigastric Abdominal Pain    Gastroesophageal Reflux Disease.    Anxiety Disorder Unspecified.    Plan    Epigastric Abdominal Pain Plan: The patient was instructed to continue the current medications. Further plans will be made after testing.    Gastroesophageal Reflux Disease Plan: The patient was instructed to continue the current medications.    Anxiety Disorder Unspecified Plan: Monitor the symptoms closely. She declined medication at this point.    Diagnoses and all orders for this visit:    1. Epigastric abdominal pain (Primary)  -     CBC & Differential; Future  -     Celiac Comprehensive Panel; Future  -     Comprehensive Metabolic Panel; Future  -     Amylase; Future  -     Lipase; Future  -     POC Urinalysis Dipstick, Automated; Future  -     CT Abdomen Pelvis With Contrast; Future  -     CBC & Differential  -     Celiac Comprehensive Panel  -     Comprehensive Metabolic Panel  -     Amylase  -     Lipase    2. Gastroesophageal reflux disease with esophagitis without hemorrhage    3. Anxiety        Return to Office    The patient was instructed to return for follow-up at the next scheduled visit. The patient was instructed to return sooner if the condition changes, worsens, or does not resolve.

## 2023-08-30 LAB
ENDOMYSIUM IGA SER QL: NEGATIVE
GLIADIN PEPTIDE IGA SER-ACNC: 6 UNITS (ref 0–19)
GLIADIN PEPTIDE IGG SER-ACNC: 2 UNITS (ref 0–19)
IGA SERPL-MCNC: 86 MG/DL (ref 87–352)
TTG IGA SER-ACNC: <2 U/ML (ref 0–3)
TTG IGG SER-ACNC: 3 U/ML (ref 0–5)

## 2023-09-05 ENCOUNTER — HOSPITAL ENCOUNTER (OUTPATIENT)
Dept: BONE DENSITY | Facility: HOSPITAL | Age: 68
Discharge: HOME | End: 2023-09-05
Admitting: INTERNAL MEDICINE
Payer: MEDICARE

## 2023-09-05 DIAGNOSIS — M85.89 OSTEOPENIA OF MULTIPLE SITES: ICD-10-CM

## 2023-09-05 PROCEDURE — 77080 DXA BONE DENSITY AXIAL: CPT

## 2023-10-02 ENCOUNTER — TELEPHONE (OUTPATIENT)
Dept: INTERNAL MEDICINE | Facility: CLINIC | Age: 68
End: 2023-10-02
Payer: MEDICARE

## 2023-10-02 NOTE — TELEPHONE ENCOUNTER
Caller: [facility]ATY [facility]    Best call back number: [phone]    What is the best time to reach you: ANY    Who are you requesting to speak with (clinical staff, provider,  specific staff member): DR. HO AND HIS NURSE    What was the call regarding: SHE WAS CALLING TO PROVIDE THE STATUS OF THE AUTHORIZATION FOR THE CT OF THE ABDOMIN AND PELVIS.  THE CPT CODE [place] 5[address]21. A LISA [facility] BENEFITS REVIEWER ON BEHALF OF [facility] HAS REVIEWED THE INFORMATION AND DENIED THE REQUEST. THE NOTES DO NOT SHOW THAT THE PATIENT HAS PAIN IN THE WHOLE ABDOMIN OR LOWER ABDOMIN. BASED ON THIS INFORMATION IT WAS DEEMED NOT MEDICALLY NECESSARY. IF THE PROVIDER WOULD LIKE TO SPEAK TO A [facility] PHYSICIAN REVIEWER THEY CAN BE REACHED [phone].    Is it okay if the provider responds through MyChart: NO

## 2023-10-07 ENCOUNTER — HOSPITAL ENCOUNTER (EMERGENCY)
Facility: HOSPITAL | Age: 68
Discharge: HOME | End: 2023-10-07
Attending: EMERGENCY MEDICINE
Payer: MEDICARE

## 2023-10-07 ENCOUNTER — APPOINTMENT (OUTPATIENT)
Dept: CT IMAGING | Facility: HOSPITAL | Age: 68
End: 2023-10-07
Payer: MEDICARE

## 2023-10-07 VITALS
RESPIRATION RATE: 18 BRPM | HEART RATE: 62 BPM | WEIGHT: 131 LBS | HEIGHT: 60 IN | OXYGEN SATURATION: 97 % | SYSTOLIC BLOOD PRESSURE: 139 MMHG | DIASTOLIC BLOOD PRESSURE: 77 MMHG | BODY MASS INDEX: 25.72 KG/M2 | TEMPERATURE: 98 F

## 2023-10-07 DIAGNOSIS — R19.7 DIARRHEA, UNSPECIFIED TYPE: Primary | ICD-10-CM

## 2023-10-07 DIAGNOSIS — R11.2 NAUSEA AND VOMITING, UNSPECIFIED VOMITING TYPE: ICD-10-CM

## 2023-10-07 DIAGNOSIS — K21.9 GERD WITHOUT ESOPHAGITIS: ICD-10-CM

## 2023-10-07 LAB
ALBUMIN SERPL-MCNC: 4.2 G/DL (ref 3.5–5.2)
ALBUMIN/GLOB SERPL: 1.6 G/DL
ALP SERPL-CCNC: 123 U/L (ref 39–117)
ALT SERPL W P-5'-P-CCNC: 24 U/L (ref 1–33)
ANION GAP SERPL CALCULATED.3IONS-SCNC: 15 MMOL/L (ref 5–15)
AST SERPL-CCNC: 21 U/L (ref 1–32)
BASOPHILS # BLD AUTO: 0.03 10*3/MM3 (ref 0–0.2)
BASOPHILS NFR BLD AUTO: 0.5 % (ref 0–1.5)
BILIRUB SERPL-MCNC: 0.3 MG/DL (ref 0–1.2)
BILIRUB UR QL STRIP: NEGATIVE
BUN SERPL-MCNC: 13 MG/DL (ref 8–23)
BUN/CREAT SERPL: 21.3 (ref 7–25)
CALCIUM SPEC-SCNC: 9.2 MG/DL (ref 8.6–10.5)
CHLORIDE SERPL-SCNC: 92 MMOL/L (ref 98–107)
CLARITY UR: CLEAR
CO2 SERPL-SCNC: 27 MMOL/L (ref 22–29)
COLOR UR: YELLOW
CREAT SERPL-MCNC: 0.61 MG/DL (ref 0.57–1)
D-LACTATE SERPL-SCNC: 0.9 MMOL/L (ref 0.5–2)
DEPRECATED RDW RBC AUTO: 42.2 FL (ref 37–54)
EGFRCR SERPLBLD CKD-EPI 2021: 98.1 ML/MIN/1.73
EOSINOPHIL # BLD AUTO: 0.12 10*3/MM3 (ref 0–0.4)
EOSINOPHIL NFR BLD AUTO: 1.9 % (ref 0.3–6.2)
ERYTHROCYTE [DISTWIDTH] IN BLOOD BY AUTOMATED COUNT: 12.8 % (ref 12.3–15.4)
GLOBULIN UR ELPH-MCNC: 2.7 GM/DL
GLUCOSE SERPL-MCNC: 101 MG/DL (ref 65–99)
GLUCOSE UR STRIP-MCNC: NEGATIVE MG/DL
HCT VFR BLD AUTO: 37.7 % (ref 34–46.6)
HGB BLD-MCNC: 12.9 G/DL (ref 12–15.9)
HGB UR QL STRIP.AUTO: NEGATIVE
HOLD SPECIMEN: NORMAL
IMM GRANULOCYTES # BLD AUTO: 0.09 10*3/MM3 (ref 0–0.05)
IMM GRANULOCYTES NFR BLD AUTO: 1.4 % (ref 0–0.5)
KETONES UR QL STRIP: NEGATIVE
LEUKOCYTE ESTERASE UR QL STRIP.AUTO: NEGATIVE
LIPASE SERPL-CCNC: 21 U/L (ref 13–60)
LYMPHOCYTES # BLD AUTO: 1.29 10*3/MM3 (ref 0.7–3.1)
LYMPHOCYTES NFR BLD AUTO: 20.5 % (ref 19.6–45.3)
MCH RBC QN AUTO: 31 PG (ref 26.6–33)
MCHC RBC AUTO-ENTMCNC: 34.2 G/DL (ref 31.5–35.7)
MCV RBC AUTO: 90.6 FL (ref 79–97)
MONOCYTES # BLD AUTO: 0.7 10*3/MM3 (ref 0.1–0.9)
MONOCYTES NFR BLD AUTO: 11.1 % (ref 5–12)
NEUTROPHILS NFR BLD AUTO: 4.06 10*3/MM3 (ref 1.7–7)
NEUTROPHILS NFR BLD AUTO: 64.6 % (ref 42.7–76)
NITRITE UR QL STRIP: NEGATIVE
NRBC BLD AUTO-RTO: 0 /100 WBC (ref 0–0.2)
PH UR STRIP.AUTO: 7 [PH] (ref 5–8)
PLATELET # BLD AUTO: 445 10*3/MM3 (ref 140–450)
PMV BLD AUTO: 8.3 FL (ref 6–12)
POTASSIUM SERPL-SCNC: 3.4 MMOL/L (ref 3.5–5.2)
PROT SERPL-MCNC: 6.9 G/DL (ref 6–8.5)
PROT UR QL STRIP: NEGATIVE
QT INTERVAL: 430 MS
QTC INTERVAL: 436 MS
RBC # BLD AUTO: 4.16 10*6/MM3 (ref 3.77–5.28)
SODIUM SERPL-SCNC: 134 MMOL/L (ref 136–145)
SP GR UR STRIP: 1.01 (ref 1–1.03)
UROBILINOGEN UR QL STRIP: NORMAL
WBC NRBC COR # BLD: 6.29 10*3/MM3 (ref 3.4–10.8)
WHOLE BLOOD HOLD COAG: NORMAL
WHOLE BLOOD HOLD SPECIMEN: NORMAL

## 2023-10-07 PROCEDURE — 25810000003 SODIUM CHLORIDE 0.9 % SOLUTION

## 2023-10-07 PROCEDURE — 81003 URINALYSIS AUTO W/O SCOPE: CPT | Performed by: EMERGENCY MEDICINE

## 2023-10-07 PROCEDURE — 85025 COMPLETE CBC W/AUTO DIFF WBC: CPT | Performed by: EMERGENCY MEDICINE

## 2023-10-07 PROCEDURE — 80053 COMPREHEN METABOLIC PANEL: CPT | Performed by: EMERGENCY MEDICINE

## 2023-10-07 PROCEDURE — 96374 THER/PROPH/DIAG INJ IV PUSH: CPT

## 2023-10-07 PROCEDURE — 25510000001 IOPAMIDOL 61 % SOLUTION: Performed by: EMERGENCY MEDICINE

## 2023-10-07 PROCEDURE — 74177 CT ABD & PELVIS W/CONTRAST: CPT

## 2023-10-07 PROCEDURE — 25010000002 ONDANSETRON PER 1 MG

## 2023-10-07 PROCEDURE — 83690 ASSAY OF LIPASE: CPT | Performed by: EMERGENCY MEDICINE

## 2023-10-07 PROCEDURE — 83605 ASSAY OF LACTIC ACID: CPT | Performed by: EMERGENCY MEDICINE

## 2023-10-07 PROCEDURE — 93005 ELECTROCARDIOGRAM TRACING: CPT

## 2023-10-07 PROCEDURE — 99285 EMERGENCY DEPT VISIT HI MDM: CPT

## 2023-10-07 RX ORDER — DICYCLOMINE HCL 20 MG
20 TABLET ORAL EVERY 6 HOURS PRN
Qty: 28 TABLET | Refills: 0 | Status: SHIPPED | OUTPATIENT
Start: 2023-10-07

## 2023-10-07 RX ORDER — DICYCLOMINE HYDROCHLORIDE 10 MG/1
20 CAPSULE ORAL ONCE
Status: COMPLETED | OUTPATIENT
Start: 2023-10-07 | End: 2023-10-07

## 2023-10-07 RX ORDER — ONDANSETRON 4 MG/1
4 TABLET, FILM COATED ORAL EVERY 6 HOURS
Qty: 20 TABLET | Refills: 0 | Status: SHIPPED | OUTPATIENT
Start: 2023-10-07

## 2023-10-07 RX ORDER — SODIUM CHLORIDE 9 MG/ML
10 INJECTION INTRAVENOUS AS NEEDED
Status: DISCONTINUED | OUTPATIENT
Start: 2023-10-07 | End: 2023-10-07 | Stop reason: HOSPADM

## 2023-10-07 RX ORDER — ONDANSETRON 2 MG/ML
4 INJECTION INTRAMUSCULAR; INTRAVENOUS ONCE
Status: COMPLETED | OUTPATIENT
Start: 2023-10-07 | End: 2023-10-07

## 2023-10-07 RX ADMIN — SODIUM CHLORIDE 1000 ML: 9 INJECTION, SOLUTION INTRAVENOUS at 19:57

## 2023-10-07 RX ADMIN — DICYCLOMINE HYDROCHLORIDE 20 MG: 10 CAPSULE ORAL at 19:57

## 2023-10-07 RX ADMIN — IOPAMIDOL 85 ML: 612 INJECTION, SOLUTION INTRAVENOUS at 18:56

## 2023-10-07 RX ADMIN — ONDANSETRON 4 MG: 2 INJECTION INTRAMUSCULAR; INTRAVENOUS at 19:57

## 2023-10-07 NOTE — ED PROVIDER NOTES
Subjective   History of Present Illness: She is a 68-year-old female accompanied by her , complaining of epigastric abdominal pain, nausea, vomiting as well as diarrhea.  Patient reports that since March 2021 when she had a cholecystectomy, she has had periods of nausea, vomiting, diarrhea, but this current episode has persisted for approximately 1 month, and she also reports accompanying esophageal fullness, bloating, the sensation that she needs to vomit but cannot.  No acute distress at this time, observed ambulating to and from [facility].    Review of Systems   Constitutional:  Positive for appetite change.   HENT: Negative.     Eyes: Negative.    Respiratory: Negative.     Cardiovascular: Negative.    Gastrointestinal:  Positive for abdominal pain, diarrhea, nausea and vomiting.   Endocrine: Negative.    Genitourinary: Negative.    Musculoskeletal: Negative.    Allergic/Immunologic: Negative.    Neurological: Negative.    Hematological: Negative.    Psychiatric/Behavioral: Negative.         Past Medical History:   Diagnosis Date    Acute cholecystitis 3/12/2022    GERD (gastroesophageal reflux disease)     Hypertension        No Known Allergies    Past Surgical History:   Procedure Laterality Date    CHOLECYSTECTOMY  3/ 12/22    CHOLECYSTECTOMY WITH INTRAOPERATIVE CHOLANGIOGRAM N/A 03/13/2022    Procedure: CHOLECYSTECTOMY LAPAROSCOPIC;  Surgeon: Harshad Hdz MD;  Location: [facility] OR;  Service: General;  Laterality: N/A;    COLONOSCOPY      2011    ERCP N/A 03/12/2022    Procedure: ENDOSCOPIC RETROGRADE CHOLANGIOPANCREATOGRAPHY;  Surgeon: Brunner, Mark I, MD;  Location:  [facility];  Service: Gastroenterology;  Laterality: N/A;  with sphincterotomy and balloon sweep with 9mm-12mm balloon      HYSTEROSCOPY ENDOMETRIAL ABLATION      TONSILLECTOMY      As a child       Family History   Problem Relation Age of Onset    Diabetes type II Mother     Hypertension Father     Breast cancer Neg Hx     Ovarian  cancer Neg Hx        Social History     Socioeconomic History    Marital status:    Tobacco Use    Smoking status: Never    Smokeless tobacco: Never   Vaping Use    Vaping Use: Never used   Substance and Sexual Activity    Alcohol use: Yes     Alcohol/week: 5.0 standard drinks of alcohol     Types: 5 Glasses of wine per week     Comment: 1 glass wine/day    Drug use: No    Sexual activity: Yes     Partners: Male     Birth control/protection: Post-menopausal           Objective   Physical Exam  Constitutional:       General: She is not in acute distress.     Appearance: Normal appearance. She is not ill-appearing.   HENT:      Head: Normocephalic and atraumatic.      Nose: Nose normal.   Eyes:      Extraocular Movements: Extraocular movements intact.      Pupils: Pupils are equal, round, and reactive to light.   Cardiovascular:      Rate and Rhythm: Normal rate and regular rhythm.      Pulses: Normal pulses.      Heart sounds: Normal heart sounds.   Pulmonary:      Effort: Pulmonary effort is normal.      Breath sounds: Normal breath sounds.   Abdominal:      General: Abdomen is flat. Bowel sounds are increased. There is distension.      Palpations: Abdomen is soft.      Tenderness: There is abdominal tenderness in the epigastric area. There is left CVA tenderness. There is no right CVA tenderness, guarding or rebound. Negative signs include Pro's sign and McBurney's sign.      Comments: Tympany over the epigastric region with appreciable distention   Musculoskeletal:         General: Normal range of motion.      Cervical back: Normal range of motion.   Skin:     General: Skin is warm and dry.      Capillary Refill: Capillary refill takes less than 2 seconds.   Neurological:      General: No focal deficit present.      Mental Status: She is alert and oriented to person, place, and time.   Psychiatric:         Mood and Affect: Mood normal.         Behavior: Behavior normal.         ECG 12  Lead      Date/Time: 10/7/2023 9:38 PM    Performed by: Scottie Rodrigues APRN  Authorized by: Ralph Ramirez DO  Comparison: compared with previous ECG from 3/12/2022  Similar to previous ECG  Rhythm: sinus rhythm  QRS axis: left  Clinical impression: abnormal ECG  Comments: Ventricular rate 62 bpm, MS interval 176 ms, cures duration 90 ms, QT interval 430 ms               ED Course   Was initially evaluated in ED pit approximately 3:45 PM     705, reevaluated patient, who reports that she has had nausea, no further episodes of vomiting, but did did have to get up and go to the bathroom for diarrhea.       At approximately 7:40 PM, discussed patient's CT findings, as well as other medications to be prescribed following discharge.                           Medical Decision Making  The patient's report of symptoms, previous history, primary differential includes gastroesophageal reflux disease, hiatal hernia, pancreatitis, appendicitis, colitis, gastroenteritis, choledocholithiasis, electrolyte derangement.  Patient will have imaging of her abdomen pelvis as appropriate, serum screening labs and urinalysis in addition to IV fluid ministration and nausea medicine.  Results to be communicated disposition appropriate.    Problems Addressed:  Diarrhea, unspecified type: complicated acute illness or injury  GERD without esophagitis: complicated acute illness or injury  Nausea and vomiting, unspecified vomiting type: complicated acute illness or injury    Amount and/or Complexity of Data Reviewed  Labs: ordered.  Radiology: ordered.  ECG/medicine tests: ordered.    Risk  Prescription drug management.        Final diagnoses:   Diarrhea, unspecified type   Nausea and vomiting, unspecified vomiting type   GERD without esophagitis       ED Disposition  ED Disposition       ED Disposition   Discharge    Condition   Stable    Comment   --               Rey Desouza MD  [address]  [facility]  [address] [phone]    Call   As needed    Michael Anderson MD  [address]  [facility] [address]  [phone]    Schedule an appointment as soon as possible for a visit on 10/9/2023  Discuss your symptoms with a gastroenterologist         Medication List        New Prescriptions      dicyclomine 20 MG tablet  Commonly known as: BENTYL  Take 1 tablet by mouth Every 6 (Six) Hours As Needed for Abdominal Cramping.     ondansetron 4 MG tablet  Commonly known as: ZOFRAN  Take 1 tablet by mouth Every 6 (Six) Hours.               Where to Get Your Medications        These medications were sent to [facility] [id number] - [place], [place] - [address] - [phone]  - [phone]   [address], [facility] [address]      Phone: [phone]   dicyclomine 20 MG tablet  ondansetron 4 MG tablet            Scottie Rodrigues, APRN  10/07/23 2245

## 2023-10-09 ENCOUNTER — TELEPHONE (OUTPATIENT)
Dept: INTERNAL MEDICINE | Facility: CLINIC | Age: 68
End: 2023-10-09
Payer: MEDICARE

## 2023-10-09 DIAGNOSIS — R10.13 EPIGASTRIC ABDOMINAL PAIN: Primary | ICD-10-CM

## 2023-10-09 DIAGNOSIS — K21.00 GASTROESOPHAGEAL REFLUX DISEASE WITH ESOPHAGITIS WITHOUT HEMORRHAGE: ICD-10-CM

## 2023-10-09 RX ORDER — PANTOPRAZOLE SODIUM 40 MG/1
TABLET, DELAYED RELEASE ORAL
Qty: 120 TABLET | Refills: 0 | Status: SHIPPED | OUTPATIENT
Start: 2023-10-09

## 2023-10-09 NOTE — TELEPHONE ENCOUNTER
I talked to Helga about a CT scan and she asked about an ER referral to Dr Anderson from this weekend.  Per note, Call   As needed     Michael Anderson MD  [address] [place], [place] [address]  [phone]     Schedule an appointment as soon as possible for a visit on 10/9/2023  Discuss your symptoms with a gastroenterologist    I advised her to call that office to see if they can schedule her and that they will be able to see the ER note, and to call back if she needs anything further

## 2023-10-09 NOTE — TELEPHONE ENCOUNTER
Spoke with patient, states she went to [facility] ER on Saturday with another episode of epigastric pain, N/V and diarrhea.  States they did complete the CT Scan which was normal.  States they gave her Zofran and Bentyl to take.  States she is some better but still not normal.  States she is not eating much, also afraid to eat as well.  States when she vomits it is mostly bile.  States last vomited Friday night.  States diarrhea is improving but not normal as well.  States she had Endoscopy 5/23.  States doesn't know what to do but hoping for some answers and help.  Aware Dr Desouza is gone for today but will get message tomorrow.  Verbalized appreciation.

## 2023-10-09 NOTE — TELEPHONE ENCOUNTER
Left message voice mail for patient that I was returning her call and to please call back.  Ofc. # given.      POST-OP DIAGNOSIS:  Mediastinal lymphadenopathy 01-Jul-2020 13:03:39  [place]

## 2023-10-10 NOTE — TELEPHONE ENCOUNTER
Reached pt at [phone]   Told her this was placed and faxed to [facility] for Dr Greenfield per Dr Jamisonneys request-she stated she was fine with that-and to expect a call within a day or 2 to schedule    I will follow up on-See referral for any further communications     She did state that the Zofran and other meds are finally being effective

## 2023-10-12 ENCOUNTER — TELEPHONE (OUTPATIENT)
Dept: INTERNAL MEDICINE | Facility: CLINIC | Age: 68
End: 2023-10-12
Payer: MEDICARE

## 2023-10-12 ENCOUNTER — LAB (OUTPATIENT)
Dept: INTERNAL MEDICINE | Facility: CLINIC | Age: 68
End: 2023-10-12
Payer: MEDICARE

## 2023-10-12 DIAGNOSIS — R30.0 DYSURIA: Primary | ICD-10-CM

## 2023-10-12 DIAGNOSIS — R30.0 DYSURIA: ICD-10-CM

## 2023-10-12 LAB
BILIRUB BLD-MCNC: NEGATIVE MG/DL
CLARITY, POC: CLEAR
COLOR UR: YELLOW
EXPIRATION DATE: ABNORMAL
GLUCOSE UR STRIP-MCNC: NEGATIVE MG/DL
KETONES UR QL: NEGATIVE
LEUKOCYTE EST, POC: NEGATIVE
Lab: ABNORMAL
NITRITE UR-MCNC: NEGATIVE MG/ML
PH UR: 8 [PH] (ref 5–8)
PROT UR STRIP-MCNC: NEGATIVE MG/DL
RBC # UR STRIP: NEGATIVE /UL
SP GR UR: 1.01 (ref 1–1.03)
UROBILINOGEN UR QL: NORMAL

## 2023-10-12 PROCEDURE — 81003 URINALYSIS AUTO W/O SCOPE: CPT | Performed by: INTERNAL MEDICINE

## 2023-10-12 PROCEDURE — 87086 URINE CULTURE/COLONY COUNT: CPT | Performed by: INTERNAL MEDICINE

## 2023-10-12 RX ORDER — SULFAMETHOXAZOLE AND TRIMETHOPRIM 800; 160 MG/1; MG/1
1 TABLET ORAL 2 TIMES DAILY
Qty: 20 TABLET | Refills: 0 | Status: SHIPPED | OUTPATIENT
Start: 2023-10-12 | End: 2023-10-22

## 2023-10-12 NOTE — TELEPHONE ENCOUNTER
Patient has woke up today and is positive she has a UTI , CAN WE CALL SOMETHING IN TO [address]

## 2023-10-12 NOTE — TELEPHONE ENCOUNTER
Spoke with patient, informed that Dr Desouza said for her to stop in for a u/a and culture and to let us know when she is here and we will send in abx for her to start after she leaves the specimen.  Explained we will send in Bactrim when she is here to start pending culture results that can take 48 hours.  Verbalized good understanding, agreement and appreciation.  States she will be here around noonish.

## 2023-10-12 NOTE — TELEPHONE ENCOUNTER
Please have her stop in for a u/a and culture.  After she leaves the specimen, please call in bactrim ds 1 po BID x 10 days.   Rey Desouza MD  09:11 EDT  10/12/23

## 2023-10-14 LAB — BACTERIA SPEC AEROBE CULT: NO GROWTH

## 2023-11-15 ENCOUNTER — OFFICE VISIT (OUTPATIENT)
Dept: INTERNAL MEDICINE | Facility: CLINIC | Age: 68
End: 2023-11-15
Payer: MEDICARE

## 2023-11-15 VITALS
DIASTOLIC BLOOD PRESSURE: 82 MMHG | HEIGHT: 58 IN | TEMPERATURE: 98.2 F | RESPIRATION RATE: 16 BRPM | WEIGHT: 130 LBS | BODY MASS INDEX: 27.29 KG/M2 | HEART RATE: 76 BPM | SYSTOLIC BLOOD PRESSURE: 158 MMHG

## 2023-11-15 DIAGNOSIS — K21.9 GASTROESOPHAGEAL REFLUX DISEASE WITHOUT ESOPHAGITIS: ICD-10-CM

## 2023-11-15 DIAGNOSIS — R73.02 IMPAIRED GLUCOSE TOLERANCE: ICD-10-CM

## 2023-11-15 DIAGNOSIS — Z13.6 SCREENING FOR CARDIOVASCULAR CONDITION: ICD-10-CM

## 2023-11-15 DIAGNOSIS — Z00.00 MEDICARE ANNUAL WELLNESS VISIT, SUBSEQUENT: Primary | ICD-10-CM

## 2023-11-15 DIAGNOSIS — Z00.00 PHYSICAL EXAM: ICD-10-CM

## 2023-11-15 DIAGNOSIS — I10 ESSENTIAL HYPERTENSION: ICD-10-CM

## 2023-11-15 DIAGNOSIS — F32.9 REACTIVE DEPRESSION: ICD-10-CM

## 2023-11-15 LAB
ALBUMIN SERPL-MCNC: 4 G/DL (ref 3.5–5.2)
ALBUMIN/CREATININE RATIO, URINE: <30
ALBUMIN/GLOB SERPL: 1.4 G/DL
ALP SERPL-CCNC: 128 U/L (ref 39–117)
ALT SERPL W P-5'-P-CCNC: 24 U/L (ref 1–33)
ANION GAP SERPL CALCULATED.3IONS-SCNC: 7.8 MMOL/L (ref 5–15)
AST SERPL-CCNC: 22 U/L (ref 1–32)
BASOPHILS # BLD AUTO: 0.06 10*3/MM3 (ref 0–0.2)
BASOPHILS NFR BLD AUTO: 1 % (ref 0–1.5)
BILIRUB BLD-MCNC: NEGATIVE MG/DL
BILIRUB SERPL-MCNC: 0.2 MG/DL (ref 0–1.2)
BUN SERPL-MCNC: 14 MG/DL (ref 8–23)
BUN/CREAT SERPL: 22.6 (ref 7–25)
CALCIUM SPEC-SCNC: 9.5 MG/DL (ref 8.6–10.5)
CHLORIDE SERPL-SCNC: 96 MMOL/L (ref 98–107)
CHOLEST SERPL-MCNC: 131 MG/DL (ref 0–200)
CLARITY, POC: CLEAR
CO2 SERPL-SCNC: 30.2 MMOL/L (ref 22–29)
COLOR UR: YELLOW
CREAT SERPL-MCNC: 0.62 MG/DL (ref 0.57–1)
DEPRECATED RDW RBC AUTO: 44.2 FL (ref 37–54)
EGFRCR SERPLBLD CKD-EPI 2021: 97.7 ML/MIN/1.73
EOSINOPHIL # BLD AUTO: 0.21 10*3/MM3 (ref 0–0.4)
EOSINOPHIL NFR BLD AUTO: 3.5 % (ref 0.3–6.2)
ERYTHROCYTE [DISTWIDTH] IN BLOOD BY AUTOMATED COUNT: 13.2 % (ref 12.3–15.4)
EXPIRATION DATE: NORMAL
EXPIRATION DATE: NORMAL
GLOBULIN UR ELPH-MCNC: 2.8 GM/DL
GLUCOSE SERPL-MCNC: 126 MG/DL (ref 65–99)
GLUCOSE UR STRIP-MCNC: NEGATIVE MG/DL
HBA1C MFR BLD: 6.1 % (ref 4.8–5.6)
HCT VFR BLD AUTO: 39.1 % (ref 34–46.6)
HDLC SERPL-MCNC: 57 MG/DL (ref 40–60)
HGB BLD-MCNC: 13 G/DL (ref 12–15.9)
IMM GRANULOCYTES # BLD AUTO: 0.04 10*3/MM3 (ref 0–0.05)
IMM GRANULOCYTES NFR BLD AUTO: 0.7 % (ref 0–0.5)
KETONES UR QL: NEGATIVE
LDLC SERPL CALC-MCNC: 51 MG/DL (ref 0–100)
LDLC/HDLC SERPL: 0.83 {RATIO}
LEUKOCYTE EST, POC: NEGATIVE
LYMPHOCYTES # BLD AUTO: 1.21 10*3/MM3 (ref 0.7–3.1)
LYMPHOCYTES NFR BLD AUTO: 20.4 % (ref 19.6–45.3)
Lab: NORMAL
Lab: NORMAL
MCH RBC QN AUTO: 30.4 PG (ref 26.6–33)
MCHC RBC AUTO-ENTMCNC: 33.2 G/DL (ref 31.5–35.7)
MCV RBC AUTO: 91.6 FL (ref 79–97)
MONOCYTES # BLD AUTO: 0.64 10*3/MM3 (ref 0.1–0.9)
MONOCYTES NFR BLD AUTO: 10.8 % (ref 5–12)
NEUTROPHILS NFR BLD AUTO: 3.77 10*3/MM3 (ref 1.7–7)
NEUTROPHILS NFR BLD AUTO: 63.6 % (ref 42.7–76)
NITRITE UR-MCNC: NEGATIVE MG/ML
NRBC BLD AUTO-RTO: 0 /100 WBC (ref 0–0.2)
PH UR: 6 [PH] (ref 5–8)
PLATELET # BLD AUTO: 426 10*3/MM3 (ref 140–450)
PMV BLD AUTO: 8.8 FL (ref 6–12)
POC CREATININE URINE: 100
POC MICROALBUMIN URINE: 10
POTASSIUM SERPL-SCNC: 4.6 MMOL/L (ref 3.5–5.2)
PROT SERPL-MCNC: 6.8 G/DL (ref 6–8.5)
PROT UR STRIP-MCNC: NEGATIVE MG/DL
RBC # BLD AUTO: 4.27 10*6/MM3 (ref 3.77–5.28)
RBC # UR STRIP: NEGATIVE /UL
SODIUM SERPL-SCNC: 134 MMOL/L (ref 136–145)
SP GR UR: 1.01 (ref 1–1.03)
TRIGL SERPL-MCNC: 134 MG/DL (ref 0–150)
TSH SERPL DL<=0.05 MIU/L-ACNC: 1.43 UIU/ML (ref 0.27–4.2)
UROBILINOGEN UR QL: NORMAL
VLDLC SERPL-MCNC: 23 MG/DL (ref 5–40)
WBC NRBC COR # BLD: 5.93 10*3/MM3 (ref 3.4–10.8)

## 2023-11-15 PROCEDURE — 85025 COMPLETE CBC W/AUTO DIFF WBC: CPT | Performed by: INTERNAL MEDICINE

## 2023-11-15 PROCEDURE — 80053 COMPREHEN METABOLIC PANEL: CPT | Performed by: INTERNAL MEDICINE

## 2023-11-15 PROCEDURE — 83036 HEMOGLOBIN GLYCOSYLATED A1C: CPT | Performed by: INTERNAL MEDICINE

## 2023-11-15 PROCEDURE — 84443 ASSAY THYROID STIM HORMONE: CPT | Performed by: INTERNAL MEDICINE

## 2023-11-15 PROCEDURE — 80061 LIPID PANEL: CPT | Performed by: INTERNAL MEDICINE

## 2023-11-15 RX ORDER — NIFEDIPINE 30 MG/1
30 TABLET, EXTENDED RELEASE ORAL DAILY
Qty: 30 TABLET | Refills: 2 | Status: SHIPPED | OUTPATIENT
Start: 2023-11-15

## 2023-11-15 NOTE — PATIENT INSTRUCTIONS
Medicare Wellness  Personal Prevention Plan of Service     Date of Office Visit:    Encounter Provider:  Rey Desouza MD  Place of Service:  [facility]  Patient Name: Helga Cardenas  :  1955    As part of the Medicare Wellness portion of your visit today, we are providing you with this personalized preventive plan of services (PPPS). This plan is based upon recommendations of the [place] Preventive Services Task Force (USPSTF) and the Advisory Committee on Immunization Practices (ACIP).    This lists the preventive care services that should be considered, and provides dates of when you are due. Items listed as completed are up-to-date and do not require any further intervention.    Health Maintenance   Topic Date Due    ZOSTER VACCINE (2 of 3) 2016    PAP SMEAR  2022    TDAP/TD VACCINES (2 - Td or Tdap) 10/03/2022    INFLUENZA VACCINE  2023    COVID-19 Vaccine (2 - - season) 2023    Pneumococcal Vaccine 65+ (1 - PCV) 2024 (Originally 2021)    ANNUAL WELLNESS VISIT  11/15/2024    BMI FOLLOWUP  11/15/2024    MAMMOGRAM  2025    DXA SCAN  2025    COLORECTAL CANCER SCREENING  2029    HEPATITIS C SCREENING  Completed       Orders Placed This Encounter   Procedures    Comprehensive Metabolic Panel     Standing Status:   Future     Standing Expiration Date:   11/15/2024     Order Specific Question:   Release to patient     Answer:   Routine Release [[id number]]    Hemoglobin A1c     Standing Status:   Future     Standing Expiration Date:   11/15/2024     Order Specific Question:   Release to patient     Answer:   Routine Release [[id number]]    Lipid Panel     Standing Status:   Future     Standing Expiration Date:   11/15/2024     Order Specific Question:   Release to patient     Answer:   Routine Release [[id number]]    TSH     Standing Status:   Future     Standing Expiration Date:    11/15/2024     Order Specific Question:   Release to patient     Answer:   Routine Release [[id number]]    POC Urinalysis Dipstick, Automated     Standing Status:   Future     Order Specific Question:   Release to patient     Answer:   Routine Release [[id number]]    POC Microalbumin     Standing Status:   Future     Order Specific Question:   Release to patient     Answer:   Routine Release [[id number]]    CBC & Differential     Standing Status:   Future     Standing Expiration Date:   11/15/2024     Order Specific Question:   Manual Differential     Answer:   No     Order Specific Question:   Release to patient     Answer:   Routine Release [[id number]]       Return in about 1 month (around 12/15/2023) for Follow-up.          Advance Care Planning and Advance Directives     You make decisions on a daily basis - decisions about where you want to live, your career, your home, your life. Perhaps one of the most important decisions you face is your choice for future medical care. Take time to talk with your family and your healthcare team and start planning today.  Advance Care Planning is a process that can help you:  Understand possible future healthcare decisions in light of your own experiences  Reflect on those decision in light of your goals and values  Discuss your decisions with those closest to you and the healthcare professionals that care for you  Make a plan by creating a document that reflects your wishes    Surrogate Decision Maker  In the event of a medical emergency, which has left you unable to communicate or to make your own decisions, you would need someone to make decisions for you.  It is important to discuss your preferences for medical treatment with this person while you are in good health.     Qualities of a surrogate decision maker:  Willing to take on this role and responsibility  Knows what you want for future medical care  Willing to follow your wishes even if they don't agree with  them  Able to make difficult medical decisions under stressful circumstances    Advance Directives  These are legal documents you can create that will guide your healthcare team and decision maker(s) when needed. These documents can be stored in the electronic medical record.    Living Will - a legal document to guide your care if you have a terminal condition or a serious illness and are unable to communicate. States vary by statute in document names/types, but most forms may include one or more of the following:        -  Directions regarding life-prolonging treatments        -  Directions regarding artificially provided nutrition/hydration        -  Choosing a healthcare decision maker        -  Direction regarding organ/tissue donation    Durable Power of  for Healthcare - this document names an -in-fact to make medical decisions for you, but it may also allow this person to make personal and financial decisions for you. Please seek the advice of an  if you need this type of document.    **Advance Directives are not required and no one may discriminate against you if you do not sign one.    Medical Orders  Many states allow specific forms/orders signed by your physician to record your wishes for medical treatment in your current state of health. This form, signed in personal communication with your physician, addresses resuscitation and other medical interventions that you may or may not want.      For more information or to schedule a time with a [facility] Advance Care Planning Facilitator contact: [email or web address] or call [phone] and someone will contact you directly.

## 2023-11-15 NOTE — PROGRESS NOTES
The ABCs of the Annual Wellness Visit  Subsequent Medicare Wellness Visit    Subjective      Helga Cardenas is a 68 y.o. female who presents for a Subsequent Medicare Wellness Visit.    The following portions of the patient's history were reviewed and   updated as appropriate: allergies, current medications, past family history, past medical history, past social history, past surgical history, and problem list.    Compared to one year ago, the patient feels her physical   health is worse.    Compared to one year ago, the patient feels her mental   health is worse.    Recent Hospitalizations:  She was not admitted to the hospital during the last year.       Current Medical Providers:  Patient Care Team:  Rey Desouza MD as PCP - General  Mendoza, Darian Falcon MD as Consulting Physician (Gastroenterology)    Outpatient Medications Prior to Visit   Medication Sig Dispense Refill    famotidine (PEPCID) 40 MG tablet TAKE ONE TABLET BY MOUTH DAILY 90 tablet 1    losartan-hydrochlorothiazide (HYZAAR) 100-25 MG per tablet TAKE ONE TABLET BY MOUTH DAILY 90 tablet 1    multivitamin with minerals tablet tablet Take 1 tablet by mouth Daily.      pantoprazole (PROTONIX) 40 MG EC tablet TAKE 1 TABLET BY MOUTH TWICE A DAY FOR 30 DAYS THEN RESUME TO TAKE 1 TABLET BY MOUTH DAILY 120 tablet 0    Triamcinolone Acetonide (NASACORT) 55 MCG/ACT nasal inhaler 2 sprays into the nostril(s) as directed by provider Daily As Needed.      dicyclomine (BENTYL) 20 MG tablet Take 1 tablet by mouth Every 6 (Six) Hours As Needed for Abdominal Cramping. 28 tablet 0    ondansetron (ZOFRAN) 4 MG tablet Take 1 tablet by mouth Every 6 (Six) Hours. 20 tablet 0     No facility-administered medications prior to visit.       No opioid medication identified on active medication list. I have reviewed chart for other potential  high risk medication/s and harmful drug interactions in the elderly.        Aspirin is not on active medication  "list.  Aspirin use is not indicated based on review of current medical condition/s. Risk of harm outweighs potential benefits.  .    Patient Active Problem List   Diagnosis    Essential hypertension    Benign paroxysmal positional vertigo    Gastroesophageal reflux disease with esophagitis without hemorrhage    Chronic allergic rhinitis    Osteopenia of multiple sites     Advance Care Planning   Advance Care Planning     Advance Directive is not on file.  ACP discussion was held with the patient during this visit. Patient does not have an advance directive, information provided.     Objective    Vitals:    11/15/23 1004   BP: 158/82   BP Location: Left arm   Patient Position: Sitting   Cuff Size: Adult   Pulse: 76   Resp: 16   Temp: 98.2 °F (36.8 °C)   TempSrc: Infrared   Weight: 59 kg (130 lb)   Height: 148 cm (58.25\")   PainSc:   5   PainLoc: Back     Estimated body mass index is 26.94 kg/m² as calculated from the following:    Height as of this encounter: 148 cm (58.25\").    Weight as of this encounter: 59 kg (130 lb).     Finger Rub Hearing{Test (right ear):passed  Finger Rub Hearing{Test (left ear):passed        Does the patient have evidence of cognitive impairment?   No            HEALTH RISK ASSESSMENT    Smoking Status:  Social History     Tobacco Use   Smoking Status Never   Smokeless Tobacco Never     Alcohol Consumption:  Social History     Substance and Sexual Activity   Alcohol Use Yes    Alcohol/week: 5.0 standard drinks of alcohol    Types: 5 Glasses of wine per week    Comment: 1 glass wine/day     Fall Risk Screen:    STEADI Fall Risk Assessment was completed, and patient is at MODERATE risk for falls. Assessment completed on:11/15/2023    Depression Screenin/15/2023    10:14 AM   PHQ-2/PHQ-9 Depression Screening   Little Interest or Pleasure in Doing Things 0-->not at all   Feeling Down, Depressed or Hopeless 1-->several days   PHQ-9: Brief Depression Severity Measure Score 1 "       Health Habits and Functional and Cognitive Screenin/15/2023    10:12 AM   Functional & Cognitive Status   Do you have difficulty preparing food and eating? No   Do you have difficulty bathing yourself, getting dressed or grooming yourself? No   Do you have difficulty using the toilet? No   Do you have difficulty moving around from place to place? Yes   Do you have trouble with steps or getting out of a bed or a chair? Yes   Current Diet Other   Dental Exam Up to date   Eye Exam Up to date   Exercise (times per week) 2 times per week   Current Exercises Include Walking   Do you need help using the phone?  No   Are you deaf or do you have serious difficulty hearing?  No   Do you need help to go to places out of walking distance? No   Do you need help shopping? No   Do you need help preparing meals?  No   Do you need help with housework?  No   Do you need help with laundry? No   Do you need help taking your medications? No   Do you need help managing money? No   Do you ever drive or ride in a car without wearing a seat belt? No   Have you felt unusual stress, anger or loneliness in the last month? Yes   Who do you live with? Spouse   If you need help, do you have trouble finding someone available to you? No   Have you been bothered in the last four weeks by sexual problems? No   Do you have difficulty concentrating, remembering or making decisions? No       Age-appropriate Screening Schedule:  Refer to the list below for future screening recommendations based on patient's age, sex and/or medical conditions. Orders for these recommended tests are listed in the plan section. The patient has been provided with a written plan.    Health Maintenance   Topic Date Due    ANNUAL WELLNESS VISIT  Never done    ZOSTER VACCINE (2 of 3) 2016    PAP SMEAR  2022    TDAP/TD VACCINES (2 - Td or Tdap) 10/03/2022    INFLUENZA VACCINE  2023    COVID-19 Vaccine (2023- season) 2023     Pneumococcal Vaccine 65+ (1 - PCV) 05/24/2024 (Originally 8/22/2021)    BMI FOLLOWUP  05/24/2024    MAMMOGRAM  08/18/2025    DXA SCAN  09/05/2025    COLORECTAL CANCER SCREENING  08/30/2029    HEPATITIS C SCREENING  Completed                  CMS Preventative Services Quick Reference  Risk Factors Identified During Encounter:    Immunizations Discussed/Encouraged: Tdap, Influenza, and COVID19    The above risks/problems have been discussed with the patient.  Pertinent information has been shared with the patient in the After Visit Summary.    Diagnoses and all orders for this visit:    1. Medicare annual wellness visit, subsequent (Primary)    2. Physical exam    3. Gastroesophageal reflux disease without esophagitis  -     NIFEdipine XL (PROCARDIA XL) 30 MG 24 hr tablet; Take 1 tablet by mouth Daily.  Dispense: 30 tablet; Refill: 2    4. Essential hypertension  -     CBC & Differential; Future  -     Comprehensive Metabolic Panel; Future  -     TSH; Future  -     POC Urinalysis Dipstick, Automated; Future  -     NIFEdipine XL (PROCARDIA XL) 30 MG 24 hr tablet; Take 1 tablet by mouth Daily.  Dispense: 30 tablet; Refill: 2    5. Impaired glucose tolerance  -     Comprehensive Metabolic Panel; Future  -     Hemoglobin A1c; Future  -     POC Microalbumin; Future    6. Reactive depression  -     sertraline (Zoloft) 50 MG tablet; Take 1 tablet by mouth Daily.  Dispense: 30 tablet; Refill: 2    7. Screening for cardiovascular condition  -     Lipid Panel; Future          Follow Up:   Next Medicare Wellness visit to be scheduled in 1 year.      An After Visit Summary and PPPS were made available to the patient.

## 2023-11-15 NOTE — PROGRESS NOTES
Chief Complaint   Patient presents with    Medicare Wellness-subsequent       History of Present Illness      The patient presents for an established patient physical examination and health maintenance visit.    The patient presents for a follow-up related to depression. She reports currently having depression symptoms. She denies suicidal ideation. Her energy level is fair. She denies agorophobia. She sleeps well. She is tearful. She states that her current depression symptoms are worsened. She has never been on a medication for depression.    The patient presents for a follow-up related to GERD. The patient is on pantoprazole for her gastroesophageal reflux. The medication is taken on a regular basis and gives inadequate relief of the symptoms. She reports dysphagia and odynophagia but she denies abdominal pain, belching, chest pain, diarrhea, early satiety, heartburn, hoarseness, nausea, rectal bleeding, vomiting or weight loss. The GERD has no known aggravating factors. Her recenct EGD revealed esophageal spasm.    The patient presents for a follow-up related to hypertension. The patient reports that she has had no headaches, dyspnea, edema, syncope, blurred vision or palpitations. She states that she is taking her medication as prescribed. She is not having medication side effects.    The patient presents for a follow-up related to impaired glucose tolerance. She does not check her blood sugars at home. She denies hypoglycemic symptoms. The patient denies polyuria, polydypsia or polyphagia. She reports no symptoms of neuropathy. The patient is not on medication for her impaired glucose tolerance. The patient does check her feet daily at home. She denies orthopnea, paroxysmal nocturnal dyspnea or dyspnea on exertion.    Medications      Current Outpatient Medications:     famotidine (PEPCID) 40 MG tablet, TAKE ONE TABLET BY MOUTH DAILY, Disp: 90 tablet, Rfl: 1    losartan-hydrochlorothiazide (HYZAAR) 100-25 MG  "per tablet, TAKE ONE TABLET BY MOUTH DAILY, Disp: 90 tablet, Rfl: 1    multivitamin with minerals tablet tablet, Take 1 tablet by mouth Daily., Disp: , Rfl:     pantoprazole (PROTONIX) 40 MG EC tablet, TAKE 1 TABLET BY MOUTH TWICE A DAY FOR 30 DAYS THEN RESUME TO TAKE 1 TABLET BY MOUTH DAILY, Disp: 120 tablet, Rfl: 0    Triamcinolone Acetonide (NASACORT) 55 MCG/ACT nasal inhaler, 2 sprays into the nostril(s) as directed by provider Daily As Needed., Disp: , Rfl:     NIFEdipine XL (PROCARDIA XL) 30 MG 24 hr tablet, Take 1 tablet by mouth Daily., Disp: 30 tablet, Rfl: 2    sertraline (Zoloft) 50 MG tablet, Take 1 tablet by mouth Daily., Disp: 30 tablet, Rfl: 2     Allergies    No Known Allergies    Problem List    Patient Active Problem List   Diagnosis    Essential hypertension    Benign paroxysmal positional vertigo    Gastroesophageal reflux disease with esophagitis without hemorrhage    Chronic allergic rhinitis    Osteopenia of multiple sites       Medications, Allergies, Problems List and Past History were reviewed and updated.    Physical Examination    /82 (BP Location: Left arm, Patient Position: Sitting, Cuff Size: Adult)   Pulse 76   Temp 98.2 °F (36.8 °C) (Infrared)   Resp 16   Ht 148 cm (58.25\")   Wt 59 kg (130 lb)   LMP  (LMP Unknown) Comment: LAST PAP 5/2020 BY DR DUARTE  BMI 26.94 kg/m²       HEENT: Head- Normocephalic Atraumatic. Facies- Within normal limits. Pinnas- Normal texture and shape bilaterally. Canals- Normal bilaterally. TMs- Normal bilaterally. Nares- Patent bilaterally. Nasal Septum- is normal. There is no tenderness to palpation over the frontal or maxillary sinuses. Lids- Normal bilaterally. Conjunctiva- Clear bilaterally. Sclera- Anicteric bilaterally. Oropharynx- Moist with no lesions. Tonsils- No enlargement, erythema or exudate.    Neck: Thyroid- non enlarged, symmetric and has no nodules. No bruits are detected. ROM- Normal Range of Motion with no rigidity.    Lungs: " Auscultation- Clear to auscultation bilaterally. There are no retractions, clubbing or cyanosis. The Expiratory to Inspiratory ratio is equal.    Lymph Nodes: Cervical- no enlarged lymph nodes noted. Clavicular- no enlarged supraclavicular lymph nodes noted. Axillary- no enlarged axillary lymph nodes noted. Inguinal- no enlarged inguinal lymph nodes noted.    Cardiovascular: There are no carotid bruits. Heart- Normal Rate with Regular rhythm and no murmurs. There are no gallops. There are no rubs. In the lower extremities there is no edema. The upper extremities do not have edema.    Abdomen: Soft, benign, non-tender with no masses, hernias, organomegaly or scars.    Breast: Normal contours bilaterally with no masses, discharge, skin changes or lumps. There are no scars noted.    Dermatologic: The patient has no worrisome or suspicious skin lesions noted.    Impression and Assessment    Normal Physical Examination.    Reactive Depression.    Gastroesophageal Reflux Disease.    Essential Hypertension.    Impaired Glucose Tolerance.    Plan    Gastroesophageal Reflux Disease Plan: The patient was instructed to continue the current medications. Medication will be added as noted below.    Essential Hypertension Plan: A medication was added as noted.    Impaired Glucose Tolerance Plan: Further plans will be formulated after test results are reviewed.    Reactive Depression Plan: Medication will be added as noted.    Counseling was provided regarding: Adequate Aerobic Exercise, Dental Visits, Flossing Teeth, Heart Healthy Diet, Seat Belt Utilization and Weight Lose.    No screening tests are indicated at this time.       Immunizations Ordered and Administered: None.    Diagnoses and all orders for this visit:    1. Medicare annual wellness visit, subsequent (Primary)    2. Physical exam    3. Gastroesophageal reflux disease without esophagitis  -     NIFEdipine XL (PROCARDIA XL) 30 MG 24 hr tablet; Take 1 tablet by mouth  Daily.  Dispense: 30 tablet; Refill: 2    4. Essential hypertension  -     CBC & Differential; Future  -     Comprehensive Metabolic Panel; Future  -     TSH; Future  -     POC Urinalysis Dipstick, Automated; Future  -     NIFEdipine XL (PROCARDIA XL) 30 MG 24 hr tablet; Take 1 tablet by mouth Daily.  Dispense: 30 tablet; Refill: 2  -     POC Urinalysis Dipstick, Automated  -     CBC & Differential  -     Comprehensive Metabolic Panel  -     TSH    5. Impaired glucose tolerance  -     Comprehensive Metabolic Panel; Future  -     Hemoglobin A1c; Future  -     POC Microalbumin; Future  -     POC Microalbumin  -     Comprehensive Metabolic Panel  -     Hemoglobin A1c    6. Reactive depression  -     sertraline (Zoloft) 50 MG tablet; Take 1 tablet by mouth Daily.  Dispense: 30 tablet; Refill: 2    7. Screening for cardiovascular condition  -     Lipid Panel; Future  -     Lipid Panel          Return to Office    The patient was instructed to return for follow-up in 1 month. The patient was instructed to return sooner if the condition changes, worsens, or does not resolve.

## 2023-11-28 DIAGNOSIS — K21.9 GASTROESOPHAGEAL REFLUX DISEASE WITHOUT ESOPHAGITIS: ICD-10-CM

## 2023-11-28 RX ORDER — FAMOTIDINE 40 MG/1
40 TABLET, FILM COATED ORAL DAILY
Qty: 90 TABLET | Refills: 1 | Status: SHIPPED | OUTPATIENT
Start: 2023-11-28

## 2023-12-12 DIAGNOSIS — F32.9 REACTIVE DEPRESSION: ICD-10-CM

## 2023-12-19 DIAGNOSIS — I10 ESSENTIAL HYPERTENSION: ICD-10-CM

## 2023-12-19 DIAGNOSIS — K21.9 GASTROESOPHAGEAL REFLUX DISEASE WITHOUT ESOPHAGITIS: ICD-10-CM

## 2023-12-19 RX ORDER — NIFEDIPINE 30 MG/1
30 TABLET, EXTENDED RELEASE ORAL DAILY
Qty: 30 TABLET | Refills: 2 | Status: SHIPPED | OUTPATIENT
Start: 2023-12-19

## 2024-01-30 ENCOUNTER — OFFICE VISIT (OUTPATIENT)
Dept: INTERNAL MEDICINE | Facility: CLINIC | Age: 69
End: 2024-01-30
Payer: MEDICARE

## 2024-01-30 VITALS
BODY MASS INDEX: 28.18 KG/M2 | RESPIRATION RATE: 16 BRPM | HEART RATE: 78 BPM | WEIGHT: 136 LBS | TEMPERATURE: 98.2 F | SYSTOLIC BLOOD PRESSURE: 132 MMHG | DIASTOLIC BLOOD PRESSURE: 76 MMHG

## 2024-01-30 DIAGNOSIS — I10 ESSENTIAL HYPERTENSION: Primary | ICD-10-CM

## 2024-01-30 DIAGNOSIS — K21.9 GASTROESOPHAGEAL REFLUX DISEASE WITHOUT ESOPHAGITIS: ICD-10-CM

## 2024-01-30 DIAGNOSIS — F32.9 REACTIVE DEPRESSION: ICD-10-CM

## 2024-01-30 PROCEDURE — 3078F DIAST BP <80 MM HG: CPT | Performed by: INTERNAL MEDICINE

## 2024-01-30 PROCEDURE — 3075F SYST BP GE 130 - 139MM HG: CPT | Performed by: INTERNAL MEDICINE

## 2024-01-30 PROCEDURE — 99214 OFFICE O/P EST MOD 30 MIN: CPT | Performed by: INTERNAL MEDICINE

## 2024-01-30 RX ORDER — TRIAMTERENE AND HYDROCHLOROTHIAZIDE 37.5; 25 MG/1; MG/1
1 CAPSULE ORAL EVERY MORNING
Qty: 30 CAPSULE | Refills: 2 | Status: SHIPPED | OUTPATIENT
Start: 2024-01-30

## 2024-01-30 RX ORDER — SERTRALINE HYDROCHLORIDE 25 MG/1
25 TABLET, FILM COATED ORAL DAILY
Qty: 30 TABLET | Refills: 3 | Status: SHIPPED | OUTPATIENT
Start: 2024-01-30

## 2024-01-30 RX ORDER — CETIRIZINE HYDROCHLORIDE 10 MG/1
10 TABLET ORAL DAILY
COMMUNITY

## 2024-01-30 NOTE — PROGRESS NOTES
Chief Complaint   Patient presents with    Follow-up     1 month follow up for chronic medical conditions       History of Present Illness    The patient presents for a follow-up related to hypertension. The patient reports that she has had edema but she denies having headaches, chest pain, dyspnea, syncope, blurred vision or palpitations. She states that she is taking her medication as prescribed. She is not having medication side effects.    The patient presents for a follow-up related to depression. She denies currently having depression symptoms. She denies suicidal ideation. Her energy level is good. She denies agorophobia. She sleeps well. She is not tearful. She states that her current depression symptoms are improved. She is currently taking a medication. The current medication regimen consists of sertraline. The patient reports side effect consisting of sleep disturbance but denies nausea, headaches, anxiety, increased depression or fatigue.    The patient presents for a follow-up related to GERD. The patient is on pantoprazole for her gastroesophageal reflux. The medication is taken on a regular basis and gives complete relief of the symptoms. She reports no abdominal pain, belching, diarrhea, dysphagia, early satiety, heartburn, hoarseness, nausea, odynophagia, rectal bleeding, vomiting or weight loss. The GERD has no known aggravating factors.    Medications      Current Outpatient Medications:     cetirizine (zyrTEC) 10 MG tablet, Take 1 tablet by mouth Daily., Disp: , Rfl:     multivitamin with minerals tablet tablet, Take 1 tablet by mouth Daily., Disp: , Rfl:     NIFEdipine XL (PROCARDIA XL) 30 MG 24 hr tablet, Take 1 tablet by mouth Daily., Disp: 30 tablet, Rfl: 2    pantoprazole (PROTONIX) 40 MG EC tablet, TAKE 1 TABLET BY MOUTH TWICE A DAY FOR 30 DAYS THEN RESUME TO TAKE 1 TABLET BY MOUTH DAILY, Disp: 120 tablet, Rfl: 0    sertraline (Zoloft) 50 MG tablet, Take 1 tablet by mouth Daily. (Patient  taking differently: Take 0.5 tablets by mouth Daily.), Disp: 90 tablet, Rfl: 1    Triamcinolone Acetonide (NASACORT) 55 MCG/ACT nasal inhaler, 2 sprays into the nostril(s) as directed by provider Daily As Needed., Disp: , Rfl:     triamterene-hydrochlorothiazide (Dyazide) 37.5-25 MG per capsule, Take 1 capsule by mouth Every Morning., Disp: 30 capsule, Rfl: 2     Allergies    No Known Allergies    Problem List    Patient Active Problem List   Diagnosis    Essential hypertension    Benign paroxysmal positional vertigo    Gastroesophageal reflux disease with esophagitis without hemorrhage    Chronic allergic rhinitis    Osteopenia of multiple sites       Medications, Allergies, Problems List and Past History were reviewed and updated.    Physical Examination    /76   Pulse 78   Temp 98.2 °F (36.8 °C) (Infrared)   Resp 16   Wt 61.7 kg (136 lb)   LMP  (LMP Unknown) Comment: LAST PAP 5/2020 BY DR DUARTE  BMI 28.18 kg/m²       HEENT: Head- Normocephalic Atraumatic. Facies- Within normal limits. Pinnas- Normal texture and shape bilaterally. Canals- Normal bilaterally. TMs- Normal bilaterally. Nares- Patent bilaterally. Nasal Septum- is normal. There is no tenderness to palpation over the frontal or maxillary sinuses. Lids- Normal bilaterally. Conjunctiva- Clear bilaterally. Sclera- Anicteric bilaterally. Oropharynx- Moist with no lesions. Tonsils- No enlargement, erythema or exudate.    Neck: Thyroid- non enlarged, symmetric and has no nodules. No bruits are detected. ROM- Normal Range of Motion with no rigidity.    Lungs: Auscultation- Clear to auscultation bilaterally. There are no retractions, clubbing or cyanosis. The Expiratory to Inspiratory ratio is equal.    Cardiovascular: There are no carotid bruits. Heart- Normal Rate with Regular rhythm and no murmurs. There are no gallops. There are no rubs. In the lower extremities there is no edema. The upper extremities do not have edema.    Abdomen: Soft,  benign, non-tender with no masses, hernias, organomegaly or scars.    Impression and Assessment    Essential Hypertension.    Reactive Depression.    Gastroesophageal Reflux Disease.    Plan    Gastroesophageal Reflux Disease Plan: The patient was instructed to continue the current medications.    Essential Hypertension Plan: The patient was instructed to continue the current medications. A medication was added as noted.    Reactive Depression Plan: The medications were adjusted as noted.    Diagnoses and all orders for this visit:    1. Essential hypertension (Primary)  -     triamterene-hydrochlorothiazide (Dyazide) 37.5-25 MG per capsule; Take 1 capsule by mouth Every Morning.  Dispense: 30 capsule; Refill: 2    2. Reactive depression  -     sertraline (Zoloft) 25 MG tablet; Take 1 tablet by mouth Daily.  Dispense: 30 tablet; Refill: 3    3. Gastroesophageal reflux disease without esophagitis        Return to Office    The patient was instructed to return for follow-up in 3 months. The patient was instructed to return sooner if the condition changes, worsens, or does not resolve.

## 2024-03-26 ENCOUNTER — OFFICE VISIT (OUTPATIENT)
Dept: ORTHOPEDIC SURGERY | Facility: CLINIC | Age: 69
End: 2024-03-26
Payer: MEDICARE

## 2024-03-26 ENCOUNTER — TELEPHONE (OUTPATIENT)
Dept: INTERNAL MEDICINE | Facility: CLINIC | Age: 69
End: 2024-03-26
Payer: MEDICARE

## 2024-03-26 VITALS
HEIGHT: 60 IN | DIASTOLIC BLOOD PRESSURE: 78 MMHG | WEIGHT: 133 LBS | SYSTOLIC BLOOD PRESSURE: 160 MMHG | BODY MASS INDEX: 26.11 KG/M2

## 2024-03-26 DIAGNOSIS — R26.89 BALANCE DISORDER: ICD-10-CM

## 2024-03-26 DIAGNOSIS — S82.102A CLOSED FRACTURE OF PROXIMAL END OF LEFT TIBIA, UNSPECIFIED FRACTURE MORPHOLOGY, INITIAL ENCOUNTER: Primary | ICD-10-CM
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
## 2024-03-26 DIAGNOSIS — S82.142A CLOSED FRACTURE OF LEFT TIBIAL PLATEAU, INITIAL ENCOUNTER: ICD-10-CM

## 2024-03-26 DIAGNOSIS — M53.3 SACRAL PAIN: ICD-10-CM

## 2024-03-26 DIAGNOSIS — M25.562 ACUTE PAIN OF LEFT KNEE: Primary | ICD-10-CM
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
## 2024-03-26 DIAGNOSIS — R26.81 GAIT INSTABILITY: ICD-10-CM

## 2024-03-26 PROCEDURE — 1159F MED LIST DOCD IN RCRD: CPT | Performed by: PHYSICIAN ASSISTANT

## 2024-03-26 PROCEDURE — 99214 OFFICE O/P EST MOD 30 MIN: CPT | Performed by: PHYSICIAN ASSISTANT

## 2024-03-26 PROCEDURE — 3078F DIAST BP <80 MM HG: CPT | Performed by: PHYSICIAN ASSISTANT

## 2024-03-26 PROCEDURE — 1160F RVW MEDS BY RX/DR IN RCRD: CPT | Performed by: PHYSICIAN ASSISTANT

## 2024-03-26 PROCEDURE — 3077F SYST BP >= 140 MM HG: CPT | Performed by: PHYSICIAN ASSISTANT

## 2024-03-26 NOTE — TELEPHONE ENCOUNTER
Patient's  called. Patient broke her left leg 36 hours ago.  gave her an order for a wheel chair but insurance requires the order to be signed by PCP. Please fax order to [facility] at [phone].

## 2024-03-26 NOTE — LETTER
"March 26, 2024     Rey Desouza MD  [address] [facility] [address]  [facility] [address]    Patient: Helga Cardenas   YOB: 1955   Date of Visit: 3/26/2024     Dear Dr. Deo MD:    Thank you for referring Helga Cardenas to me for evaluation. Below are the relevant portions of my assessment and plan of care.    If you have questions, please do not hesitate to call me. I look forward to following Helga along with you.         Sincerely,        Cristina Christianson PA-C        CC: No Recipients      Progress Notes:      [facility] Note    Subjective     Chief Complaint   Patient presents with    Left Tibia - Pain   DOI: 3/24/2024    HPI  Helga Cardenas is a 68 y.o. female.  New patient presents for evaluation of left knee pain.  RUSSELL: Patient fell on the above date around midnight resulting in pain to her left lower extremity and low back/sacral pain (history prior \"tailbone\" injury years ago).  She was seen at urgent care yesterday and noted to have a lateral plateau fracture.  Patient was placed in a hinged knee brace, referred to orthopedics for further evaluation.    Unfortunately patient is currently having an inner ear problem and unable to use crutches.  Urgent care did give her prescription for wheelchair.    Pain scale: 3/10.  Severity of the pain mild to moderate.  Quality of the pain aching, shooting.  Associated symptoms pain, swelling.  Activity related to pain standing, rising from a seated position, movement of joint.  Pain eased by resting, sitting, Advil, elevating.  No reported numbness or tingling.      Denies fever, chills, night sweats or other constitutional symptoms.    Past Medical History:   Diagnosis Date    Acute cholecystitis 3/12/2022    GERD (gastroesophageal reflux disease)     Hypertension       Past Surgical History:   Procedure Laterality Date    CHOLECYSTECTOMY  3/ 12/22    CHOLECYSTECTOMY WITH INTRAOPERATIVE " CHOLANGIOGRAM N/A 03/13/2022    Procedure: CHOLECYSTECTOMY LAPAROSCOPIC;  Surgeon: Harshad Hdz MD;  Location:  [facility];  Service: General;  Laterality: N/A;    COLONOSCOPY      2011    ERCP N/A 03/12/2022    Procedure: ENDOSCOPIC RETROGRADE CHOLANGIOPANCREATOGRAPHY;  Surgeon: Brunner, Mark I, MD;  Location:  [facility];  Service: Gastroenterology;  Laterality: N/A;  with sphincterotomy and balloon sweep with 9mm-12mm balloon      HYSTEROSCOPY ENDOMETRIAL ABLATION      TONSILLECTOMY      As a child      Family History   Problem Relation Age of Onset    Diabetes type II Mother     Hypertension Father     Breast cancer Neg Hx     Ovarian cancer Neg Hx      Social History     Socioeconomic History    Marital status:    Tobacco Use    Smoking status: Never     Passive exposure: Never    Smokeless tobacco: Never   Vaping Use    Vaping status: Never Used   Substance and Sexual Activity    Alcohol use: Yes     Alcohol/week: 5.0 standard drinks of alcohol     Types: 5 Glasses of wine per week     Comment: 1 glass wine/day    Drug use: No    Sexual activity: Yes     Partners: Male     Birth control/protection: Post-menopausal      Current Outpatient Medications on File Prior to Visit   Medication Sig Dispense Refill    cetirizine (zyrTEC) 10 MG tablet Take 1 tablet by mouth Daily.      multivitamin with minerals tablet tablet Take 1 tablet by mouth Daily.      NIFEdipine XL (PROCARDIA XL) 30 MG 24 hr tablet Take 1 tablet by mouth Daily. 30 tablet 2    pantoprazole (PROTONIX) 40 MG EC tablet TAKE 1 TABLET BY MOUTH TWICE A DAY FOR 30 DAYS THEN RESUME TO TAKE 1 TABLET BY MOUTH DAILY 120 tablet 0    sertraline (Zoloft) 25 MG tablet Take 1 tablet by mouth Daily. 30 tablet 3    Triamcinolone Acetonide (NASACORT) 55 MCG/ACT nasal inhaler 2 sprays into the nostril(s) as directed by provider Daily As Needed.      triamterene-hydrochlorothiazide (Dyazide) 37.5-25 MG per capsule Take 1 capsule by mouth Every Morning.  "30 capsule 2    ipratropium (ATROVENT) 0.03 % nasal spray 2 sprays into the nostril(s) as directed by provider Every 12 (Twelve) Hours for 10 days. 30 mL 0    [DISCONTINUED] benzonatate (TESSALON) 200 MG capsule Take 1 capsule by mouth 3 (Three) Times a Day As Needed for Cough. 30 capsule 0    [DISCONTINUED] promethazine-dextromethorphan (PROMETHAZINE-DM) 6.25-15 MG/5ML syrup Take 5 mL by mouth 4 (Four) Times a Day As Needed for Cough. 118 mL 0     No current facility-administered medications on file prior to visit.      No Known Allergies     The following portions of the patient's history were reviewed and updated as appropriate: allergies, current medications, past family history, past medical history, past social history, past surgical history, and problem list.    Review of Systems   Constitutional: Negative.    HENT: Negative.     Eyes: Negative.    Respiratory: Negative.     Cardiovascular: Negative.    Gastrointestinal: Negative.    Endocrine: Negative.    Genitourinary: Negative.    Musculoskeletal:  Positive for arthralgias.   Skin: Negative.    Allergic/Immunologic: Negative.    Neurological: Negative.    Hematological: Negative.    Psychiatric/Behavioral: Negative.          Objective      Physical Exam  /78   Ht 152.4 cm (60\") Comment: pt reported  Wt 60.3 kg (133 lb) Comment: PT REPORTED  LMP  (LMP Unknown) Comment: LAST PAP 5/2020 BY DR DUARTE  BMI 25.97 kg/m²     Body mass index is 25.97 kg/m².    GENERAL APPEARANCE: awake, alert & oriented x 3, in no acute distress and well developed, well nourished  PSYCH: normal mood and affect  LUNGS:  breathing nonlabored, no wheezing  EYES: sclera anicteric, pupils equal  CARDIOVASCULAR: palpable pulses. Capillary refill less than 2 seconds  INTEGUMENTARY: skin intact, no clubbing, cyanosis  NEUROLOGIC:  Normal Sensation         Ortho Exam  Left knee  Skin: Intact without any erythema, warmth.  Positive effusion.  Motion: 0-115°.  Tenderness: Diffuse " tenderness throughout the knee but more prominent lateral joint line, lateral tibial plateau.  Instability: Ligamentous instability was not assessed  Straight leg raise: Intact.  Motor: Grossly intact Q/HS/TA/GS/EHL/P  Sensory: Grossly intact DP/SP/S/S/T nerve distributions.       Imaging/Studies  Dr. Bonilla and I reviewed plain film imaging performed yesterday on 3/25/2024    XR ANKLE 3+ VW LEFT, XR TIBIA FIBULA 2 VW LEFT, XR KNEE 4+ VW LEFT, XR FOOT 3+ VW LEFT     Date of Exam: 3/25/2024 11:00 AM EDT     Indication: fall, LLE pain     Comparison: None available.     Findings:  Left knee: No definite soft tissue swelling. There is a small knee joint effusion with suspected lipohemarthrosis. There is a subtle linear lucency which projects over the articular surface of the lateral tibial plateau on both the AP and oblique images;   there is some associated increased opacity of the subchondral bone in this area which could reflect edema. A discrete cortical irregularity is not identified. Otherwise no evidence of acute fracture. There are mild tricompartmental osteoarthritic   changes, worst in the patellofemoral compartment.     Left tibia and fibula: The mid and distal tibia and fibula appear unremarkable. The soft tissues of the leg appear normal.     Left ankle no definite soft tissue swelling or ankle joint effusion. No acute fracture or traumatic malalignment. The ankle mortise joint space is symmetric and preserved.     Left foot: No soft tissue swelling. No acute fracture or traumatic malalignment. The Lisfranc joint is congruent though suboptimally assessed on nonweightbearing radiographs. A plantar calcaneal enthesophyte is noted.     IMPRESSION:  Impression:  Subtle linear lucency projecting over the articular surface of the lateral tibial plateau suspicious for nondisplaced fracture; there is a suspected lipohemarthrosis. This could be confirmed/further evaluated with CT or MRI of the knee.     The  remainder of left lower extremity is unremarkable.        Electronically Signed: Vidal Puga MD    3/25/2024 11:44 AM EDT    Workstation ID: [id number]      XR SACRUM AND COCCYX     Date of Exam: 3/25/2024 11:01 AM EDT     Indication: fall, coccygeal pain, hx of coccygeal fx     Comparison: CT abdomen pelvis 10/7/2023     Findings:  There is 4 mm of anterior listhesis of L5 on S1 related to facet arthropathy, similar to previous CT. No acute fracture is identified on conventional radiography. Alignment is normal. Joint space is adequately maintained. Soft tissues are unremarkable.     IMPRESSION:  Impression:  No acute osseous process identified.        Electronically Signed: Walt Lala MD    3/25/2024 11:42 AM EDT    Workstation ID: [id number]    Assessment/Plan        ICD-10-CM ICD-9-CM   1. Acute pain of left knee  M25.562 719.46   2. Closed fracture of left tibial plateau, initial encounter  S82.142A 823.00   3. Sacral pain  M53.3 724.6   4. Fall from slip, trip, or stumble, initial encounter  W01.0XXA E885.9       No orders of the defined types were placed in this encounter.       -Left knee pain due to lateral tibial plateau (nondisplaced fracture) secondary to fall.  -Sacral pain--when sitting will use a donut pad to alleviate pressure to the area.  Patient has seen chiropractor in the past for adjustments which she reports 3 to 4 days of relief.  For now nonimpact, ice, heat, and gentle stretching to the area.  If remains symptomatic then can contact PCP for possible referral to pain management.  -Reviewed imaging with the patient.  -Discussed the nature of this injury/condition with the patient (new problem with uncertain prognosis) as well as treatment options.  Risks and benefits of operative and nonoperative intervention were discussed.  After discussion, shared decision-making led to trial of nonoperative treatment.  Patient understands that this can change, based on future evaluation and  imaging.  -Patient placed in a TROM brace that is locked in extension when ambulating even though she is to be nonweightbearing.  She is allowed to place the foot down for stability.  -To unlock the brace when sitting and work on range of motion.  -Recommend OTC NSAIDS/pain medication as needed.  -To hold onto the neoprene hinged knee sleeve that she was given in urgent care for future use.  -Has been provided a prescription for wheelchair by urgent care--unable to use walker or crutches secondary to balance problem with inner ear issues and low back/sacral pain.  -Patient understands she is at an increased risk of developing posttraumatic arthritis.  -Follow up 4/9/24 for repeat evaluation, to include repeat imaging AP and lateral of the knee (nonweightbearing).  -Questions and concerns answered.    History, exam and imaging discussed with Dr. Bonilla, who agrees with the above assessment and plan.    Medical Decision Making  Management Options : over-the-counter medicine and close treatment of fracture or dislocation  Data/Risk: radiology tests      Cristina Christianson PA-C  03/26/24  11:31 EDT               EMR Dragon/Transcription disclaimer:  Much of this encounter note is an electronic transcription of spoken language to printed text. Electronic transcription of spoken language may permit erroneous, or at times, nonsensical words or phrases to be inadvertently transcribed. Although I have reviewed the note for such errors, some may still exist.

## 2024-03-26 NOTE — PROGRESS NOTES
"    [facility] [place]    Subjective     Chief Complaint   Patient presents with    Left Tibia - Pain   DOI: 3/24/2024    HPI  Helga Cardenas is a 68 y.o. female.  New patient presents for evaluation of left knee pain.  RUSSELL: Patient fell on the above date around midnight resulting in pain to her left lower extremity and low back/sacral pain (history prior \"tailbone\" injury years ago).  She was seen at urgent care yesterday and noted to have a lateral plateau fracture.  Patient was placed in a hinged knee brace, referred to orthopedics for further evaluation.    Unfortunately patient is currently having an inner ear problem and unable to use crutches.  Urgent care did give her prescription for wheelchair.    Pain scale: 3/10.  Severity of the pain mild to moderate.  Quality of the pain aching, shooting.  Associated symptoms pain, swelling.  Activity related to pain standing, rising from a seated position, movement of joint.  Pain eased by resting, sitting, Advil, elevating.  No reported numbness or tingling.      Denies fever, chills, night sweats or other constitutional symptoms.    Past Medical History:   Diagnosis Date    Acute cholecystitis 3/12/2022    GERD (gastroesophageal reflux disease)     Hypertension       Past Surgical History:   Procedure Laterality Date    CHOLECYSTECTOMY  3/ 12/22    CHOLECYSTECTOMY WITH INTRAOPERATIVE CHOLANGIOGRAM N/A 03/13/2022    Procedure: CHOLECYSTECTOMY LAPAROSCOPIC;  Surgeon: Harshad Hdz MD;  Location: [facility] OR;  Service: General;  Laterality: N/A;    COLONOSCOPY      2011    ERCP N/A 03/12/2022    Procedure: ENDOSCOPIC RETROGRADE CHOLANGIOPANCREATOGRAPHY;  Surgeon: Brunner, Mark I, MD;  Location: [facility] ENDOSCOPY;  Service: Gastroenterology;  Laterality: N/A;  with sphincterotomy and balloon sweep with 9mm-12mm balloon      HYSTEROSCOPY ENDOMETRIAL ABLATION      TONSILLECTOMY      As a child      Family History   Problem Relation Age of " Onset    Diabetes type II Mother     Hypertension Father     Breast cancer Neg Hx     Ovarian cancer Neg Hx      Social History     Socioeconomic History    Marital status:    Tobacco Use    Smoking status: Never     Passive exposure: Never    Smokeless tobacco: Never   Vaping Use    Vaping status: Never Used   Substance and Sexual Activity    Alcohol use: Yes     Alcohol/week: 5.0 standard drinks of alcohol     Types: 5 Glasses of wine per week     Comment: 1 glass wine/day    Drug use: No    Sexual activity: Yes     Partners: Male     Birth control/protection: Post-menopausal      Current Outpatient Medications on File Prior to Visit   Medication Sig Dispense Refill    cetirizine (zyrTEC) 10 MG tablet Take 1 tablet by mouth Daily.      multivitamin with minerals tablet tablet Take 1 tablet by mouth Daily.      NIFEdipine XL (PROCARDIA XL) 30 MG 24 hr tablet Take 1 tablet by mouth Daily. 30 tablet 2    pantoprazole (PROTONIX) 40 MG EC tablet TAKE 1 TABLET BY MOUTH TWICE A DAY FOR 30 DAYS THEN RESUME TO TAKE 1 TABLET BY MOUTH DAILY 120 tablet 0    sertraline (Zoloft) 25 MG tablet Take 1 tablet by mouth Daily. 30 tablet 3    Triamcinolone Acetonide (NASACORT) 55 MCG/ACT nasal inhaler 2 sprays into the nostril(s) as directed by provider Daily As Needed.      triamterene-hydrochlorothiazide (Dyazide) 37.5-25 MG per capsule Take 1 capsule by mouth Every Morning. 30 capsule 2    ipratropium (ATROVENT) 0.03 % nasal spray 2 sprays into the nostril(s) as directed by provider Every 12 (Twelve) Hours for 10 days. 30 mL 0    [DISCONTINUED] benzonatate (TESSALON) 200 MG capsule Take 1 capsule by mouth 3 (Three) Times a Day As Needed for Cough. 30 capsule 0    [DISCONTINUED] promethazine-dextromethorphan (PROMETHAZINE-DM) 6.25-15 MG/5ML syrup Take 5 mL by mouth 4 (Four) Times a Day As Needed for Cough. 118 mL 0     No current facility-administered medications on file prior to visit.      No Known Allergies     The  "following portions of the patient's history were reviewed and updated as appropriate: allergies, current medications, past family history, past medical history, past social history, past surgical history, and problem list.    Review of Systems   Constitutional: Negative.    HENT: Negative.     Eyes: Negative.    Respiratory: Negative.     Cardiovascular: Negative.    Gastrointestinal: Negative.    Endocrine: Negative.    Genitourinary: Negative.    Musculoskeletal:  Positive for arthralgias.   Skin: Negative.    Allergic/Immunologic: Negative.    Neurological: Negative.    Hematological: Negative.    Psychiatric/Behavioral: Negative.          Objective      Physical Exam  /78   Ht 152.4 cm (60\") Comment: pt reported  Wt 60.3 kg (133 lb) Comment: PT REPORTED  LMP  (LMP Unknown) Comment: LAST PAP 5/2020 BY DR DUARTE  BMI 25.97 kg/m²     Body mass index is 25.97 kg/m².    GENERAL APPEARANCE: awake, alert & oriented x 3, in no acute distress and well developed, well nourished  PSYCH: normal mood and affect  LUNGS:  breathing nonlabored, no wheezing  EYES: sclera anicteric, pupils equal  CARDIOVASCULAR: palpable pulses. Capillary refill less than 2 seconds  INTEGUMENTARY: skin intact, no clubbing, cyanosis  NEUROLOGIC:  Normal Sensation         Ortho Exam  Left knee  Skin: Intact without any erythema, warmth.  Positive effusion.  Motion: 0-115°.  Tenderness: Diffuse tenderness throughout the knee but more prominent lateral joint line, lateral tibial plateau.  Instability: Ligamentous instability was not assessed  Straight leg raise: Intact.  Motor: Grossly intact Q/HS/TA/GS/EHL/P  Sensory: Grossly intact DP/SP/S/S/T nerve distributions.       Imaging/Studies  Dr. Bonilla and I reviewed plain film imaging performed yesterday on 3/25/2024    XR ANKLE 3+ VW LEFT, XR TIBIA FIBULA 2 VW LEFT, XR KNEE 4+ VW LEFT, XR FOOT 3+ VW LEFT     Date of Exam: 3/25/2024 11:00 AM EDT     Indication: fall, LLE pain     Comparison: " None available.     Findings:  Left knee: No definite soft tissue swelling. There is a small knee joint effusion with suspected lipohemarthrosis. There is a subtle linear lucency which projects over the articular surface of the lateral tibial plateau on both the AP and oblique images;   there is some associated increased opacity of the subchondral bone in this area which could reflect edema. A discrete cortical irregularity is not identified. Otherwise no evidence of acute fracture. There are mild tricompartmental osteoarthritic   changes, worst in the patellofemoral compartment.     Left tibia and fibula: The mid and distal tibia and fibula appear unremarkable. The soft tissues of the leg appear normal.     Left ankle no definite soft tissue swelling or ankle joint effusion. No acute fracture or traumatic malalignment. The ankle mortise joint space is symmetric and preserved.     Left foot: No soft tissue swelling. No acute fracture or traumatic malalignment. The Lisfranc joint is congruent though suboptimally assessed on nonweightbearing radiographs. A plantar calcaneal enthesophyte is noted.     IMPRESSION:  Impression:  Subtle linear lucency projecting over the articular surface of the lateral tibial plateau suspicious for nondisplaced fracture; there is a suspected lipohemarthrosis. This could be confirmed/further evaluated with CT or MRI of the knee.     The remainder of left lower extremity is unremarkable.        Electronically Signed: Vidal Puga MD    3/25/2024 11:44 AM EDT    Workstation ID: [id number]      XR SACRUM AND COCCYX     Date of Exam: 3/25/2024 11:01 AM EDT     Indication: fall, coccygeal pain, hx of coccygeal fx     Comparison: CT abdomen pelvis 10/7/2023     Findings:  There is 4 mm of anterior listhesis of L5 on S1 related to facet arthropathy, similar to previous CT. No acute fracture is identified on conventional radiography. Alignment is normal. Joint space is adequately maintained.  Soft tissues are unremarkable.     IMPRESSION:  Impression:  No acute osseous process identified.        Electronically Signed: Walt Lala MD    3/25/2024 11:42 AM EDT    Workstation ID: [id number]    Assessment/Plan        ICD-10-CM ICD-9-CM   1. Acute pain of left knee  M25.562 719.46   2. Closed fracture of left tibial plateau, initial encounter  S82.142A 823.00   3. Sacral pain  M53.3 724.6   4. Fall from slip, trip, or stumble, initial encounter  W01.0XXA E885.9       No orders of the defined types were placed in this encounter.       -Left knee pain due to lateral tibial plateau (nondisplaced fracture) secondary to fall.  -Sacral pain--when sitting will use a donut pad to alleviate pressure to the area.  Patient has seen chiropractor in the past for adjustments which she reports 3 to 4 days of relief.  For now nonimpact, ice, heat, and gentle stretching to the area.  If remains symptomatic then can contact PCP for possible referral to pain management.  -Reviewed imaging with the patient.  -Discussed the nature of this injury/condition with the patient (new problem with uncertain prognosis) as well as treatment options.  Risks and benefits of operative and nonoperative intervention were discussed.  After discussion, shared decision-making led to trial of nonoperative treatment.  Patient understands that this can change, based on future evaluation and imaging.  -Patient placed in a TROM brace that is locked in extension when ambulating even though she is to be nonweightbearing.  She is allowed to place the foot down for stability.  -To unlock the brace when sitting and work on range of motion.  -Recommend OTC NSAIDS/pain medication as needed.  -To hold onto the neoprene hinged knee sleeve that she was given in urgent care for future use.  -Has been provided a prescription for wheelchair by urgent care--unable to use walker or crutches secondary to balance problem with inner ear issues and low back/sacral  pain.  -Patient understands she is at an increased risk of developing posttraumatic arthritis.  -Follow up 4/9/24 for repeat evaluation, to include repeat imaging AP and lateral of the knee (nonweightbearing).  -Questions and concerns answered.    History, exam and imaging discussed with Dr. Bonilla, who agrees with the above assessment and plan.    Medical Decision Making  Management Options : over-the-counter medicine and close treatment of fracture or dislocation  Data/Risk: radiology tests      Cristina Christianson PA-C  03/26/24  11:31 EDT               EMR Dragon/Transcription disclaimer:  Much of this encounter note is an electronic transcription of spoken language to printed text. Electronic transcription of spoken language may permit erroneous, or at times, nonsensical words or phrases to be inadvertently transcribed. Although I have reviewed the note for such errors, some may still exist.

## 2024-03-27 NOTE — TELEPHONE ENCOUNTER
Patient's  called. Insurance is requiring more information as to why a wheelchair is needed for patient. Patient has a chronic inner ear condition which causes her to be unstable and have balance problems and ortho agrees that she needs a wheelchair. The fall has caused her to have a sore back. Fax order to: [phone]  Call: [phone]

## 2024-03-27 NOTE — TELEPHONE ENCOUNTER
Insurance is requiring more information on reason patient needs a wheelchair, they states fractured leg is not enough.  Needing proof of medical need.

## 2024-03-27 NOTE — TELEPHONE ENCOUNTER
Order with additional diagnosis faxed to [employer] at [phone] with confirmation of receipt received.     Left message voicemail for patient's , Raghu, that we were returning his call and to please call back.  Ofc. # given.     RELAY:  Order with additional diagnosis faxed to [employer] at [phone] with confirmation of receipt received.     Document if notified.

## 2024-03-27 NOTE — TELEPHONE ENCOUNTER
ALDEN IS CALLING BACK TO FOLLOW UP ON THIS REQUEST. HE'S HAVING A LOT OF TROUBLE MOVING HER AROUND THE HOUSE IN AN OFFICE CHAIR. HE'S AWARE THE DOCTOR IS BUSY BUT WOULD LIKE TO GET THIS TAKEN CARE OF TODAY IF AT ALL POSSIBLE.

## 2024-03-28 NOTE — TELEPHONE ENCOUNTER
Name: Riley Cardenas    Relationship: Emergency Contact    Best Callback Number: [phone]    HUB PROVIDED THE RELAY MESSAGE FROM THE OFFICE    PATIENT: VOICED UNDERSTANDING AND HAS NO FURTHER QUESTIONS AT THIS TIME

## 2024-04-19 ENCOUNTER — OFFICE VISIT (OUTPATIENT)
Dept: ORTHOPEDIC SURGERY | Facility: CLINIC | Age: 69
End: 2024-04-19
Payer: MEDICARE

## 2024-04-19 VITALS — DIASTOLIC BLOOD PRESSURE: 84 MMHG | SYSTOLIC BLOOD PRESSURE: 122 MMHG

## 2024-04-19 DIAGNOSIS — Z09 FRACTURE FOLLOW-UP: Primary | ICD-10-CM

## 2024-04-19 DIAGNOSIS — S82.142D CLOSED FRACTURE OF LEFT TIBIAL PLATEAU WITH ROUTINE HEALING, SUBSEQUENT ENCOUNTER: ICD-10-CM

## 2024-04-19 PROCEDURE — 3079F DIAST BP 80-89 MM HG: CPT | Performed by: PHYSICIAN ASSISTANT

## 2024-04-19 PROCEDURE — 1160F RVW MEDS BY RX/DR IN RCRD: CPT | Performed by: PHYSICIAN ASSISTANT

## 2024-04-19 PROCEDURE — 99213 OFFICE O/P EST LOW 20 MIN: CPT | Performed by: PHYSICIAN ASSISTANT

## 2024-04-19 PROCEDURE — 1159F MED LIST DOCD IN RCRD: CPT | Performed by: PHYSICIAN ASSISTANT

## 2024-04-19 PROCEDURE — 3074F SYST BP LT 130 MM HG: CPT | Performed by: PHYSICIAN ASSISTANT

## 2024-04-19 NOTE — PROGRESS NOTES
[facility] – [facility] Note        Subjective     CC: Follow-up (3 week follow-up: Closed fracture of left tibial plateau, subsequent encounter (DOI: 3/24/24))      AUSTEN Cardenas is a 68 y.o. female. Patient returns for follow up left tibial plateau fracture. She been wearing the TROM brace as directed, remained non-weight bearing and working on knee range of motion.    Pain scale: max 2/10. Associated symptoms pain, swelling. Pain eased by resting, lying down, elevating. No reported numbness or tingling.     Overall, patient's symptoms are improved.    ROS:    Constiutional:Pt denies fever, chills, nausea, or vomiting.  MSK:as above        Objective      Past Medical History  Past Medical History:   Diagnosis Date    Acute cholecystitis 3/12/2022    GERD (gastroesophageal reflux disease)     Hypertension      Social History     Socioeconomic History    Marital status:    Tobacco Use    Smoking status: Never     Passive exposure: Never    Smokeless tobacco: Never   Vaping Use    Vaping status: Never Used   Substance and Sexual Activity    Alcohol use: Yes     Alcohol/week: 5.0 standard drinks of alcohol     Types: 5 Glasses of wine per week     Comment: 1 glass wine/day    Drug use: No    Sexual activity: Yes     Partners: Male     Birth control/protection: Post-menopausal          Physical Exam  /84   LMP  (LMP Unknown) Comment: LAST PAP 5/2020 BY DR DUARTE    There is no height or weight on file to calculate BMI.    Patient is well nourished and well developed.        Ortho Exam  Left knee  Skin: Intact without any erythema, warmth.  Positive effusion, improved. Positive soft tissue swelling into lower leg and ankle. Calf soft and nontender.  Motion: 0-115°.  Tenderness: Continues with tenderness throughout the knee but improved. Tightness to posterior knee.  Instability: Ligamentous instability was not assessed  Straight leg raise: Intact.  Motor: Grossly intact  Q/HS/TA/GS/EHL/P  Sensory: Grossly intact DP/SP/S/S/T nerve distributions.       Imaging/Labs/EMG Reviewed:  Ordered left knee plain films.  Imaging read/interpreted by Dr. Bonilla.    Imaging Results (Last 24 Hours)       Procedure Component Value Units Date/Time    XR Knee 1 or 2 View Left [[id number]] Resulted: 04/19/24 0941     Updated: 04/19/24 0942    Narrative:      Indication: Follow-up closed treatment left tibial plateau fracture    Comparison: Todays xrays were compared to previous xrays from 3/25/2024      Impression:           Left Knee: There is interval consolidation lateral tibial plateau fracture   with no change in alignment compared to prior imaging.   articular surface   remains well-positioned with no significant step-off.              Assessment:  1. Fracture follow-up    2. Closed fracture of left tibial plateau with routine healing, subsequent encounter        Plan:  Follow-up left lateral tibial plateau nondisplaced fracture--stable and healing.  Reviewed imaging with the patient and .  Patient to continue with TROM brace locked in extension when ambulating even though she is to remain nonweightbearing.  Again allowed to place her foot down flat for stability.  Continue to unlock the brace and work on gentle range of motion, to include stretching to the posterior aspect of the knee.  Also encouraged gentle range of motion of the ankle.  Continue with ice and elevation help with inflammation/swelling control.  Follow up in 3 weeks for repeat evaluation, to include repeat imaging (AP and lateral) of the knee.  Questions and concerns answered.    History, exam and imaging discussed with Dr. Bonilla, who agrees with the above assessment and plan.      Cristina Christianson PA-C  04/19/24  12:18 EDT      Dictated Utilizing Dragon Dictation.

## 2024-04-28 DIAGNOSIS — I10 ESSENTIAL HYPERTENSION: ICD-10-CM

## 2024-04-29 ENCOUNTER — TELEPHONE (OUTPATIENT)
Dept: INTERNAL MEDICINE | Facility: CLINIC | Age: 69
End: 2024-04-29
Payer: MEDICARE

## 2024-04-29 RX ORDER — TRIAMTERENE AND HYDROCHLOROTHIAZIDE 37.5; 25 MG/1; MG/1
1 CAPSULE ORAL EVERY MORNING
Qty: 90 CAPSULE | Refills: 1 | Status: SHIPPED | OUTPATIENT
Start: 2024-04-29

## 2024-04-29 NOTE — TELEPHONE ENCOUNTER
Caller: Helga Cardenas    Relationship to patient: Self    Best call back number: [phone]     Chief complaint: SOONER APPOINTMENT     Type of visit: OFFICE VISIT     Requested date: SOONER THAN 5.1.24    If rescheduling, when is the original appointment: 5.1.24    Additional notes: PATIENT STATES SHE WOULD LIKE TO SPEAK WITH PAIGE ONLY ABOUT RESCHEDULING.     PLEASE CALL PATIENT BACK, IF NO ANSWER LEAVE A DETAILED MESSAGE.

## 2024-05-01 ENCOUNTER — OFFICE VISIT (OUTPATIENT)
Dept: INTERNAL MEDICINE | Facility: CLINIC | Age: 69
End: 2024-05-01
Payer: MEDICARE

## 2024-05-01 VITALS
TEMPERATURE: 98.4 F | DIASTOLIC BLOOD PRESSURE: 90 MMHG | HEART RATE: 70 BPM | RESPIRATION RATE: 16 BRPM | SYSTOLIC BLOOD PRESSURE: 180 MMHG

## 2024-05-01 DIAGNOSIS — K21.9 GASTROESOPHAGEAL REFLUX DISEASE WITHOUT ESOPHAGITIS: ICD-10-CM

## 2024-05-01 DIAGNOSIS — I10 ESSENTIAL HYPERTENSION: Primary | ICD-10-CM

## 2024-05-01 DIAGNOSIS — R73.02 IMPAIRED GLUCOSE TOLERANCE: ICD-10-CM

## 2024-05-01 DIAGNOSIS — M65.331 TRIGGER MIDDLE FINGER OF RIGHT HAND: ICD-10-CM

## 2024-05-01 DIAGNOSIS — F32.9 REACTIVE DEPRESSION: ICD-10-CM

## 2024-05-01 LAB
ALBUMIN SERPL-MCNC: 4.2 G/DL (ref 3.5–5.2)
ALBUMIN/GLOB SERPL: 1.6 G/DL
ALP SERPL-CCNC: 126 U/L (ref 39–117)
ALT SERPL W P-5'-P-CCNC: 27 U/L (ref 1–33)
ANION GAP SERPL CALCULATED.3IONS-SCNC: 9.8 MMOL/L (ref 5–15)
AST SERPL-CCNC: 21 U/L (ref 1–32)
BASOPHILS # BLD AUTO: 0.06 10*3/MM3 (ref 0–0.2)
BASOPHILS NFR BLD AUTO: 0.8 % (ref 0–1.5)
BILIRUB SERPL-MCNC: 0.2 MG/DL (ref 0–1.2)
BUN SERPL-MCNC: 21 MG/DL (ref 8–23)
BUN/CREAT SERPL: 38.2 (ref 7–25)
CALCIUM SPEC-SCNC: 9.8 MG/DL (ref 8.6–10.5)
CHLORIDE SERPL-SCNC: 97 MMOL/L (ref 98–107)
CO2 SERPL-SCNC: 29.2 MMOL/L (ref 22–29)
CREAT SERPL-MCNC: 0.55 MG/DL (ref 0.57–1)
DEPRECATED RDW RBC AUTO: 46.2 FL (ref 37–54)
EGFRCR SERPLBLD CKD-EPI 2021: 100.6 ML/MIN/1.73
EOSINOPHIL # BLD AUTO: 0.35 10*3/MM3 (ref 0–0.4)
EOSINOPHIL NFR BLD AUTO: 4.9 % (ref 0.3–6.2)
ERYTHROCYTE [DISTWIDTH] IN BLOOD BY AUTOMATED COUNT: 14 % (ref 12.3–15.4)
GLOBULIN UR ELPH-MCNC: 2.7 GM/DL
GLUCOSE SERPL-MCNC: 79 MG/DL (ref 65–99)
HBA1C MFR BLD: 6.6 % (ref 4.8–5.6)
HCT VFR BLD AUTO: 39.7 % (ref 34–46.6)
HGB BLD-MCNC: 13.4 G/DL (ref 12–15.9)
IMM GRANULOCYTES # BLD AUTO: 0.07 10*3/MM3 (ref 0–0.05)
IMM GRANULOCYTES NFR BLD AUTO: 1 % (ref 0–0.5)
LYMPHOCYTES # BLD AUTO: 1.16 10*3/MM3 (ref 0.7–3.1)
LYMPHOCYTES NFR BLD AUTO: 16.3 % (ref 19.6–45.3)
MCH RBC QN AUTO: 30.5 PG (ref 26.6–33)
MCHC RBC AUTO-ENTMCNC: 33.8 G/DL (ref 31.5–35.7)
MCV RBC AUTO: 90.2 FL (ref 79–97)
MONOCYTES # BLD AUTO: 0.65 10*3/MM3 (ref 0.1–0.9)
MONOCYTES NFR BLD AUTO: 9.2 % (ref 5–12)
NEUTROPHILS NFR BLD AUTO: 4.81 10*3/MM3 (ref 1.7–7)
NEUTROPHILS NFR BLD AUTO: 67.8 % (ref 42.7–76)
NRBC BLD AUTO-RTO: 0 /100 WBC (ref 0–0.2)
PLATELET # BLD AUTO: 362 10*3/MM3 (ref 140–450)
PMV BLD AUTO: 8.8 FL (ref 6–12)
POTASSIUM SERPL-SCNC: 4 MMOL/L (ref 3.5–5.2)
PROT SERPL-MCNC: 6.9 G/DL (ref 6–8.5)
RBC # BLD AUTO: 4.4 10*6/MM3 (ref 3.77–5.28)
SODIUM SERPL-SCNC: 136 MMOL/L (ref 136–145)
WBC NRBC COR # BLD AUTO: 7.1 10*3/MM3 (ref 3.4–10.8)

## 2024-05-01 PROCEDURE — G2211 COMPLEX E/M VISIT ADD ON: HCPCS | Performed by: INTERNAL MEDICINE

## 2024-05-01 PROCEDURE — 99214 OFFICE O/P EST MOD 30 MIN: CPT | Performed by: INTERNAL MEDICINE

## 2024-05-01 PROCEDURE — 80053 COMPREHEN METABOLIC PANEL: CPT | Performed by: INTERNAL MEDICINE

## 2024-05-01 PROCEDURE — 83036 HEMOGLOBIN GLYCOSYLATED A1C: CPT | Performed by: INTERNAL MEDICINE

## 2024-05-01 PROCEDURE — 3080F DIAST BP >= 90 MM HG: CPT | Performed by: INTERNAL MEDICINE

## 2024-05-01 PROCEDURE — 3077F SYST BP >= 140 MM HG: CPT | Performed by: INTERNAL MEDICINE

## 2024-05-01 PROCEDURE — 85025 COMPLETE CBC W/AUTO DIFF WBC: CPT | Performed by: INTERNAL MEDICINE

## 2024-05-01 NOTE — PROGRESS NOTES
Chief Complaint   Patient presents with    Follow-up     3 month follow up for chronic medical conditions       History of Present Illness      The patient presents for a follow-up related to depression. She denies currently having depression symptoms. She denies suicidal ideation. Her energy level is good. She denies agorophobia. She sleeps well. She is not tearful. She states that her current depression symptoms are stable. She is currently taking a medication. The current medication regimen consists of sertraline. The patient denies having medication side effects including nausea, headaches, anxiety, increased depression or fatigue.    The patient presents for a follow-up related to hypertension. The patient reports that she has had no headaches, chest pain, dyspnea, edema, syncope, blurred vision or palpitations. She states that she is taking her medication as prescribed. She is not having medication side effects.    The patient presents for a follow-up related to GERD. The patient is on pantoprazole for her gastroesophageal reflux. The medication is taken on a regular basis and gives complete relief of the symptoms. She reports no abdominal pain, belching, diarrhea, dysphagia, early satiety, heartburn, hoarseness, nausea, odynophagia, rectal bleeding, vomiting or weight loss. The GERD has no known aggravating factors.    The patient presents for a follow-up related to impaired glucose tolerance. She does not check her blood sugars at home. She denies hypoglycemic symptoms. The patient denies polyuria, polydypsia or polyphagia. She reports no symptoms of neuropathy. The patient is not on medication for her impaired glucose tolerance. The patient does check her feet daily at home. She denies orthopnea, paroxysmal nocturnal dyspnea or dyspnea on exertion.    She presents with a contracture of the right third finger. It has been present for one month. It is painful. There is no history of  trauma.    Medications      Current Outpatient Medications:     cetirizine (zyrTEC) 10 MG tablet, Take 1 tablet by mouth Daily., Disp: , Rfl:     multivitamin with minerals tablet tablet, Take 1 tablet by mouth Daily., Disp: , Rfl:     NIFEdipine XL (PROCARDIA XL) 30 MG 24 hr tablet, Take 1 tablet by mouth Daily., Disp: 30 tablet, Rfl: 2    pantoprazole (PROTONIX) 40 MG EC tablet, TAKE 1 TABLET BY MOUTH TWICE A DAY FOR 30 DAYS THEN RESUME TO TAKE 1 TABLET BY MOUTH DAILY, Disp: 120 tablet, Rfl: 0    sertraline (Zoloft) 25 MG tablet, Take 1 tablet by mouth Daily., Disp: 30 tablet, Rfl: 3    Triamcinolone Acetonide (NASACORT) 55 MCG/ACT nasal inhaler, 2 sprays into the nostril(s) as directed by provider Daily As Needed., Disp: , Rfl:     triamterene-hydrochlorothiazide (DYAZIDE) 37.5-25 MG per capsule, TAKE 1 CAPSULE BY MOUTH EVERY MORNING, Disp: 90 capsule, Rfl: 1     Allergies    No Known Allergies    Problem List    Patient Active Problem List   Diagnosis    Essential hypertension    Benign paroxysmal positional vertigo    Gastroesophageal reflux disease with esophagitis without hemorrhage    Chronic allergic rhinitis    Osteopenia of multiple sites       Medications, Allergies, Problems List and Past History were reviewed and updated.    Physical Examination    /90 (BP Location: Left arm, Patient Position: Sitting, Cuff Size: Adult)   Pulse 70   Temp 98.4 °F (36.9 °C)   Resp 16   LMP  (LMP Unknown) Comment: LAST PAP 5/2020 BY DR GERALD JENNINGS: Head- Normocephalic Atraumatic. Facies- Within normal limits. Pinnas- Normal texture and shape bilaterally. Canals- Normal bilaterally. TMs- Normal bilaterally. Nares- Patent bilaterally. Nasal Septum- is normal. There is no tenderness to palpation over the frontal or maxillary sinuses. Lids- Normal bilaterally. Conjunctiva- Clear bilaterally. Sclera- Anicteric bilaterally. Oropharynx- Moist with no lesions. Tonsils- No enlargement, erythema or exudate.    Neck:  Thyroid- non enlarged, symmetric and has no nodules. No bruits are detected. ROM- Normal Range of Motion with no rigidity.    Lungs: Auscultation- Clear to auscultation bilaterally. There are no retractions, clubbing or cyanosis. The Expiratory to Inspiratory ratio is equal.    Cardiovascular: There are no carotid bruits. Heart- Normal Rate with Regular rhythm and no murmurs. There are no gallops. There are no rubs. In the lower extremities there is no edema. The upper extremities do not have edema.    Abdomen: Soft, benign, non-tender with no masses, hernias, organomegaly or scars.    Hands: The right hand has normal henrik landmarks and no noted atrophy. There is a normal range of motion of the right hand. There is no evidence of tenosynovitis. There is no tenderness noted in the hands. There are contractures noted of the right third finger. The contractures are consistent with flexion contractures.    Impression and Assessment    Reactive Depression.    Essential Hypertension.    Gastroesophageal Reflux Disease.    Impaired Glucose Tolerance.    Trigger Finger.    Plan    Gastroesophageal Reflux Disease Plan: The patient was instructed to continue the current medications.    Essential Hypertension Plan: The medications were adjusted as noted.    Impaired Glucose Tolerance Plan: Further plans will be formulated after test results are reviewed.    Trigger Finger Plan: The patient was referred to hand surgery.    Reactive Depression Plan: The patient was instructed to continue the current medications.    Diagnoses and all orders for this visit:    1. Essential hypertension (Primary)  -     CBC & Differential; Future  -     Comprehensive Metabolic Panel; Future  -     Cancel: POC Urinalysis Dipstick, Automated; Future  -     metoprolol tartrate (LOPRESSOR) 25 MG tablet; Take 1 tablet by mouth 2 (Two) Times a Day.  Dispense: 60 tablet; Refill: 5  -     CBC & Differential  -     Comprehensive Metabolic Panel    2.  Reactive depression    3. Gastroesophageal reflux disease without esophagitis    4. Impaired glucose tolerance  -     Comprehensive Metabolic Panel; Future  -     Hemoglobin A1c; Future  -     Comprehensive Metabolic Panel  -     Hemoglobin A1c    5. Trigger middle finger of right hand  -     Ambulatory Referral to Hand Surgery      Medications Discontinued During This Encounter   Medication Reason    NIFEdipine XL (PROCARDIA XL) 30 MG 24 hr tablet          Return to Office    The patient was instructed to return for follow-up in 3 months. The patient was instructed to return sooner if the condition changes, worsens, or does not resolve.

## 2024-05-06 ENCOUNTER — OFFICE VISIT (OUTPATIENT)
Dept: ORTHOPEDIC SURGERY | Facility: CLINIC | Age: 69
End: 2024-05-06
Payer: MEDICARE

## 2024-05-06 VITALS — HEIGHT: 60 IN | WEIGHT: 133 LBS | BODY MASS INDEX: 26.11 KG/M2

## 2024-05-06 DIAGNOSIS — S82.142D CLOSED FRACTURE OF LEFT TIBIAL PLATEAU WITH ROUTINE HEALING, SUBSEQUENT ENCOUNTER: ICD-10-CM

## 2024-05-06 DIAGNOSIS — Z09 FRACTURE FOLLOW-UP: Primary | ICD-10-CM

## 2024-05-06 PROCEDURE — 1159F MED LIST DOCD IN RCRD: CPT | Performed by: PHYSICIAN ASSISTANT

## 2024-05-06 PROCEDURE — 1160F RVW MEDS BY RX/DR IN RCRD: CPT | Performed by: PHYSICIAN ASSISTANT

## 2024-05-06 PROCEDURE — 99213 OFFICE O/P EST LOW 20 MIN: CPT | Performed by: PHYSICIAN ASSISTANT

## 2024-05-06 NOTE — PROGRESS NOTES
"    [facility] [place] [facility] Note        Subjective     CC: Follow-up (3 week f/u -- Closed fracture of left tibial plateau, subsequent encounter (DOI: 3/24/24))      AUSTEN Cardenas is a 68 y.o. female.  Patient returns for follow-up evaluation left knee lateral tibial plateau fracture.  She reports she is doing much better.    Pain scale: 1/10.  She takes Advil as needed for pain control.    Overall, patient's symptoms are improved.    ROS:    Constiutional:Pt denies fever, chills, nausea, or vomiting.  MSK:as above        Objective      Past Medical History  Past Medical History:   Diagnosis Date    Acute cholecystitis 3/12/2022    GERD (gastroesophageal reflux disease)     Hypertension      Social History     Socioeconomic History    Marital status:    Tobacco Use    Smoking status: Never     Passive exposure: Never    Smokeless tobacco: Never   Vaping Use    Vaping status: Never Used   Substance and Sexual Activity    Alcohol use: Yes     Alcohol/week: 5.0 standard drinks of alcohol     Types: 5 Glasses of wine per week     Comment: 1 glass wine/day    Drug use: No    Sexual activity: Yes     Partners: Male     Birth control/protection: Post-menopausal          Physical Exam  Ht 152.4 cm (60\")   Wt 60.3 kg (133 lb)   LMP  (LMP Unknown) Comment: LAST PAP 5/2020 BY DR DUARTE  BMI 25.97 kg/m²     Body mass index is 25.97 kg/m².    Patient is well nourished and well developed.        Ortho Exam  Left knee  Skin: Intact without any erythema, warmth.  Minimal swelling noted.   Motion: 0-115°.  Tenderness: Minimal discomfort noted lateral aspect of proximal tibia.  Instability: Ligamentous instability was not assessed  Straight leg raise: Intact.  Motor/sensory: Grossly intact L2-S1.      Imaging/Labs/EMG Reviewed and Interpreted:  Ordered left knee plain films.  Imaging read/interpreted by Dr. Lisa.    Left Knee Radiographs  Indication: Follow-up lateral tibial plateau fracture, left " knee  Views: AP, lateral views of the left knee     Comparison: 4/19/2024     Findings:   Stable alignment of the lateral tibial plateau fracture, no unusual bony features.  Interval healing appreciated.      Assessment:  1. Fracture follow-up    2. Closed fracture of left tibial plateau with routine healing, subsequent encounter        Plan:  Follow-up left lateral tibial plateau nondisplaced fracture--stable and healing.  Reviewed imaging with the patient and .  Patient to continue with TROM but now allowed to weight-bear in the brace.  Patient was provided a walker to help her sister with ambulation.  Continue with ice and elevation help with inflammation/swelling control.  Follow up in 2 weeks for repeat evaluation, to include repeat imaging (AP and lateral) of the knee.  If she remains stable then anticipating transitioning to a hinged knee brace and advancing activity.  Questions and concerns answered.      Cristina Christianson PA-C  05/07/24  12:35 EDT      Dictated Utilizing Dragon Dictation.

## 2024-05-14 DIAGNOSIS — I10 ESSENTIAL HYPERTENSION: ICD-10-CM

## 2024-05-14 DIAGNOSIS — K21.9 GASTROESOPHAGEAL REFLUX DISEASE WITHOUT ESOPHAGITIS: ICD-10-CM

## 2024-05-14 RX ORDER — NIFEDIPINE 30 MG/1
30 TABLET, EXTENDED RELEASE ORAL DAILY
Qty: 30 TABLET | Refills: 2 | OUTPATIENT
Start: 2024-05-14

## 2024-05-14 NOTE — TELEPHONE ENCOUNTER
Name: Helga Cardenas      Relationship: Self      Best Callback Number: [phone]       HUB PROVIDED THE RELAY MESSAGE FROM THE OFFICE      PATIENT: VOICED UNDERSTANDING AND HAS NO FURTHER QUESTIONS AT THIS TIME    ADDITIONAL INFORMATION: PATIENT CALLED BACK AND STATES SHE HAS NOT BEEN TAKING THIS MEDICATION.

## 2024-05-14 NOTE — TELEPHONE ENCOUNTER
Left voicemail message for patient to return call to the office. Office number given.    RELAY: Dr. Desouza believes this medication was discontinued. Please verify with patient if she has still been taking this medication.     Document response.

## 2024-05-21 ENCOUNTER — OFFICE VISIT (OUTPATIENT)
Dept: ORTHOPEDIC SURGERY | Facility: CLINIC | Age: 69
End: 2024-05-21
Payer: MEDICARE

## 2024-05-21 VITALS
DIASTOLIC BLOOD PRESSURE: 80 MMHG | BODY MASS INDEX: 26.11 KG/M2 | WEIGHT: 133 LBS | HEIGHT: 60 IN | SYSTOLIC BLOOD PRESSURE: 142 MMHG

## 2024-05-21 DIAGNOSIS — S82.142D CLOSED FRACTURE OF LEFT TIBIAL PLATEAU WITH ROUTINE HEALING, SUBSEQUENT ENCOUNTER: ICD-10-CM

## 2024-05-21 DIAGNOSIS — Z09 FRACTURE FOLLOW-UP: Primary | ICD-10-CM

## 2024-05-21 NOTE — PROGRESS NOTES
"    [facility] [place] [facility] Note        Subjective     CC: Follow-up (2 week follow up --Closed fracture of left tibial plateau,  (DOI: 3/24/24)/ /)      AUSTEN Cardenas is a 68 y.o. female. Follow up left knee lateral tibial plateau fracture. Wearing TROM unlocked and weight bearing as tolerated with walker. Feels deconditioned and weakness to left leg.    Pain scale: max 2/10. Walking causes slight increase in pain but only after prolonged walking or at end of day.. Resting helps.    Overall, patient's symptoms are improved.    ROS:    Constiutional:Pt denies fever, chills, nausea, or vomiting.  MSK:as above        Objective      Past Medical History  Past Medical History:   Diagnosis Date    Acute cholecystitis 3/12/2022    GERD (gastroesophageal reflux disease)     Hypertension      Social History     Socioeconomic History    Marital status:    Tobacco Use    Smoking status: Never     Passive exposure: Never    Smokeless tobacco: Never   Vaping Use    Vaping status: Never Used   Substance and Sexual Activity    Alcohol use: Yes     Alcohol/week: 5.0 standard drinks of alcohol     Types: 5 Glasses of wine per week     Comment: 1 glass wine/day    Drug use: No    Sexual activity: Yes     Partners: Male     Birth control/protection: Post-menopausal          Physical Exam  /80   Ht 152.4 cm (60\")   Wt 60.3 kg (133 lb)   LMP  (LMP Unknown) Comment: LAST PAP 5/2020 BY DR DUARTE  BMI 25.97 kg/m²     Body mass index is 25.97 kg/m².    Patient is well nourished and well developed.        Ortho Exam  Left knee  Skin: Intact without any erythema, warmth, swelling.   Motion: 0-120°.  Tenderness: Minimal discomfort noted lateral proximal tibia.  Straight leg raise: Intact.  Motor/sensory: Grossly intact L2-S1.      Imaging/Labs/EMG Reviewed and Interpreted:  Ordered left knee plain films.  Imaging read/interpreted by Dr. Bonilla.    Imaging Results (Last 24 Hours)       Procedure " Component Value Units Date/Time    XR Knee 1 or 2 View Left [[id number]] Resulted: 05/21/24 1251     Updated: 05/21/24 1252    Narrative:      Indication: Follow-up closed treatment left lateral tibial plateau   fracture    Comparison: Todays xrays were compared to previous xrays from 5/6/2024      Impression:           Left Knee: Radiographs demonstrate interval consolidation of the lateral   tibial plateau fracture with no change in alignment.  No significant   incongruity of the joint or depressed segment identified.            Assessment:  1. Fracture follow-up    2. Closed fracture of left tibial plateau with routine healing, subsequent encounter        Plan:  Follow-up left lateral tibial plateau nondisplaced fracture--stable and healing.  Reviewed imaging with the patient and .  Placed into a hinged knee brace.  Continue WBAT, to use walker as needed.  Referred to PT.   Continue with OTC NSAIDs/pain medication as needed.  Follow up in 4 weeks for repeat evaluation, to include imaging of the left knee.    Questions and concerns answered.      Cristina Christianson PA-C  05/21/24  20:32 EDT      Dictated Utilizing Dragon Dictation.

## 2024-05-28 DIAGNOSIS — F32.9 REACTIVE DEPRESSION: ICD-10-CM

## 2024-05-28 RX ORDER — SERTRALINE HYDROCHLORIDE 25 MG/1
25 TABLET, FILM COATED ORAL DAILY
Qty: 90 TABLET | Refills: 0 | Status: SHIPPED | OUTPATIENT
Start: 2024-05-28

## 2024-05-29 ENCOUNTER — TELEPHONE (OUTPATIENT)
Dept: INTERNAL MEDICINE | Facility: CLINIC | Age: 69
End: 2024-05-29
Payer: MEDICARE

## 2024-05-29 NOTE — TELEPHONE ENCOUNTER
Spoke with Maged with [facility], explained that Dr Desouza will see patient in clinic Monday morning and wants to hold off on PT until after evaluation. Verbalized good understanding and agreement. States he would appreciate instructions on how Dr Desouza wants him to proceed after the visit.  States we can call him or send the instructions with the patient to him.     Spoke with patient and she is aware of above.  Will keep B/P log and bring with her to appointment.

## 2024-05-29 NOTE — TELEPHONE ENCOUNTER
Caller: [facility]    Relationship: Other    Best call back number: [phone]     What is the best time to reach you: ANYTIME, OK TO LEAVE VOICEMAIL.    Who are you requesting to speak with (clinical staff, provider,  specific staff member): CLINICAL STAFF    Do you know the name of the person who called: ALEN JONES    What was the call regarding: ALEN IS CALLING TO NOTIFY PROVIDER THAT HE DEFERRED PATIENT'S PHYSICAL THERAPY BECAUSE OF HER BLOOD PRESSURE BEING 210/100. ALEN WOULD LIKE A CALL BACK TO DISCUSS IF HE SHOULD DO SOME LIGHT PHYSICAL THERAPY STILL OR WHAT.    Is it okay if the provider responds through MyChart: NO CALL ONLY

## 2024-06-03 ENCOUNTER — OFFICE VISIT (OUTPATIENT)
Dept: INTERNAL MEDICINE | Facility: CLINIC | Age: 69
End: 2024-06-03
Payer: MEDICARE

## 2024-06-03 VITALS
RESPIRATION RATE: 18 BRPM | DIASTOLIC BLOOD PRESSURE: 68 MMHG | WEIGHT: 143 LBS | BODY MASS INDEX: 27.93 KG/M2 | SYSTOLIC BLOOD PRESSURE: 142 MMHG | HEART RATE: 56 BPM | TEMPERATURE: 97.8 F

## 2024-06-03 DIAGNOSIS — F32.9 REACTIVE DEPRESSION: ICD-10-CM

## 2024-06-03 DIAGNOSIS — K21.9 GASTROESOPHAGEAL REFLUX DISEASE WITHOUT ESOPHAGITIS: ICD-10-CM

## 2024-06-03 DIAGNOSIS — I10 ESSENTIAL HYPERTENSION: ICD-10-CM

## 2024-06-03 DIAGNOSIS — F32.9 REACTIVE DEPRESSION: Primary | ICD-10-CM

## 2024-06-03 PROCEDURE — 1160F RVW MEDS BY RX/DR IN RCRD: CPT | Performed by: INTERNAL MEDICINE

## 2024-06-03 PROCEDURE — 3077F SYST BP >= 140 MM HG: CPT | Performed by: INTERNAL MEDICINE

## 2024-06-03 PROCEDURE — G2211 COMPLEX E/M VISIT ADD ON: HCPCS | Performed by: INTERNAL MEDICINE

## 2024-06-03 PROCEDURE — 99214 OFFICE O/P EST MOD 30 MIN: CPT | Performed by: INTERNAL MEDICINE

## 2024-06-03 PROCEDURE — 1125F AMNT PAIN NOTED PAIN PRSNT: CPT | Performed by: INTERNAL MEDICINE

## 2024-06-03 PROCEDURE — 1159F MED LIST DOCD IN RCRD: CPT | Performed by: INTERNAL MEDICINE

## 2024-06-03 PROCEDURE — 3078F DIAST BP <80 MM HG: CPT | Performed by: INTERNAL MEDICINE

## 2024-06-03 RX ORDER — SERTRALINE HYDROCHLORIDE 25 MG/1
25 TABLET, FILM COATED ORAL DAILY
Qty: 90 TABLET | Refills: 0 | OUTPATIENT
Start: 2024-06-03

## 2024-06-03 RX ORDER — AMLODIPINE BESYLATE 5 MG/1
5 TABLET ORAL DAILY
Qty: 30 TABLET | Refills: 2 | Status: SHIPPED | OUTPATIENT
Start: 2024-06-03

## 2024-06-03 NOTE — PROGRESS NOTES
Chief Complaint   Patient presents with    Follow-up     1 month follow up chronic medical problems       History of Present Illness      The patient presents for a follow-up related to depression. She denies currently having depression symptoms. She denies suicidal ideation. Her energy level is good. She denies agorophobia. She sleeps well. She is not tearful. She states that her current depression symptoms are stable. She is currently taking a medication. The current medication regimen consists of sertraline. The patient denies having medication side effects including nausea, headaches, anxiety, increased depression or fatigue.    The patient presents for a follow-up related to GERD. The patient is on pantoprazole for her gastroesophageal reflux. The medication is taken on a regular basis and gives complete relief of the symptoms. She reports no abdominal pain, belching, chest pain, diarrhea, dysphagia, early satiety, heartburn, hoarseness, nausea, odynophagia, rectal bleeding, vomiting or weight loss. The GERD has no known aggravating factors.    The patient presents for a follow-up related to hypertension. The patient reports that she has had no headaches, dyspnea, edema, syncope, blurred vision or palpitations. She states that she is taking her medication as prescribed. She is not having medication side effects. She checks her blood pressures at home. The home readings are elevated.    Medications      Current Outpatient Medications:     cetirizine (zyrTEC) 10 MG tablet, Take 1 tablet by mouth Daily., Disp: , Rfl:     metoprolol tartrate (LOPRESSOR) 25 MG tablet, Take 1 tablet by mouth 2 (Two) Times a Day., Disp: 60 tablet, Rfl: 5    multivitamin with minerals tablet tablet, Take 1 tablet by mouth Daily., Disp: , Rfl:     pantoprazole (PROTONIX) 40 MG EC tablet, TAKE 1 TABLET BY MOUTH TWICE A DAY FOR 30 DAYS THEN RESUME TO TAKE 1 TABLET BY MOUTH DAILY, Disp: 120 tablet, Rfl: 0    sertraline (ZOLOFT) 25 MG  tablet, TAKE 1 TABLET BY MOUTH DAILY, Disp: 90 tablet, Rfl: 0    Triamcinolone Acetonide (NASACORT) 55 MCG/ACT nasal inhaler, 2 sprays into the nostril(s) as directed by provider Daily As Needed., Disp: , Rfl:     triamterene-hydrochlorothiazide (DYAZIDE) 37.5-25 MG per capsule, TAKE 1 CAPSULE BY MOUTH EVERY MORNING, Disp: 90 capsule, Rfl: 1    amLODIPine (NORVASC) 5 MG tablet, Take 1 tablet by mouth Daily., Disp: 30 tablet, Rfl: 2     Allergies    No Known Allergies    Problem List    Patient Active Problem List   Diagnosis    Essential hypertension    Benign paroxysmal positional vertigo    Gastroesophageal reflux disease with esophagitis without hemorrhage    Chronic allergic rhinitis    Osteopenia of multiple sites       Medications, Allergies, Problems List and Past History were reviewed and updated.    Physical Examination    /68 (BP Location: Right arm, Patient Position: Sitting, Cuff Size: Small Adult)   Pulse 56   Temp 97.8 °F (36.6 °C) (Infrared)   Resp 18   Wt 64.9 kg (143 lb)   LMP  (LMP Unknown) Comment: LAST PAP 5/2020 BY DR DUARTE  BMI 27.93 kg/m²       HEENT: Head- Normocephalic Atraumatic. Facies- Within normal limits. Pinnas- Normal texture and shape bilaterally. Canals- Normal bilaterally. TMs- Normal bilaterally. Nares- Patent bilaterally. Nasal Septum- is normal. There is no tenderness to palpation over the frontal or maxillary sinuses. Lids- Normal bilaterally. Conjunctiva- Clear bilaterally. Sclera- Anicteric bilaterally. Oropharynx- Moist with no lesions. Tonsils- No enlargement, erythema or exudate.    Neck: Thyroid- non enlarged, symmetric and has no nodules. No bruits are detected. ROM- Normal Range of Motion with no rigidity.    Lungs: Auscultation- Clear to auscultation bilaterally. There are no retractions, clubbing or cyanosis. The Expiratory to Inspiratory ratio is equal.    Cardiovascular: There are no carotid bruits. Heart- Normal Rate with Regular rhythm and no murmurs.  There are no gallops. There are no rubs. In the lower extremities there is no edema. The upper extremities do not have edema.    Abdomen: Soft, benign, non-tender with no masses, hernias, organomegaly or scars.    Impression and Assessment    Reactive Depression.    Gastroesophageal Reflux Disease.    Essential Hypertension.    Plan    Gastroesophageal Reflux Disease Plan: The patient was instructed to continue the current medications.    Essential Hypertension Plan: The patient was instructed to continue the current medications. A medication was added as noted.    Reactive Depression Plan: The patient was instructed to continue the current medications.    Diagnoses and all orders for this visit:    1. Reactive depression (Primary)    2. Gastroesophageal reflux disease without esophagitis    3. Essential hypertension  -     amLODIPine (NORVASC) 5 MG tablet; Take 1 tablet by mouth Daily.  Dispense: 30 tablet; Refill: 2        Return to Office    The patient was instructed to return for follow-up in 2 weeks. The patient was instructed to return sooner if the condition changes, worsens, or does not resolve.

## 2024-06-18 ENCOUNTER — OFFICE VISIT (OUTPATIENT)
Dept: INTERNAL MEDICINE | Facility: CLINIC | Age: 69
End: 2024-06-18
Payer: MEDICARE

## 2024-06-18 VITALS
DIASTOLIC BLOOD PRESSURE: 78 MMHG | TEMPERATURE: 98 F | WEIGHT: 142 LBS | RESPIRATION RATE: 16 BRPM | HEART RATE: 64 BPM | BODY MASS INDEX: 27.73 KG/M2 | SYSTOLIC BLOOD PRESSURE: 134 MMHG

## 2024-06-18 DIAGNOSIS — F32.9 REACTIVE DEPRESSION: ICD-10-CM

## 2024-06-18 DIAGNOSIS — I10 ESSENTIAL HYPERTENSION: Primary | ICD-10-CM

## 2024-06-18 PROCEDURE — 99214 OFFICE O/P EST MOD 30 MIN: CPT | Performed by: INTERNAL MEDICINE

## 2024-06-18 PROCEDURE — 3075F SYST BP GE 130 - 139MM HG: CPT | Performed by: INTERNAL MEDICINE

## 2024-06-18 PROCEDURE — 3078F DIAST BP <80 MM HG: CPT | Performed by: INTERNAL MEDICINE

## 2024-06-18 PROCEDURE — 1159F MED LIST DOCD IN RCRD: CPT | Performed by: INTERNAL MEDICINE

## 2024-06-18 PROCEDURE — 1160F RVW MEDS BY RX/DR IN RCRD: CPT | Performed by: INTERNAL MEDICINE

## 2024-06-18 PROCEDURE — 1125F AMNT PAIN NOTED PAIN PRSNT: CPT | Performed by: INTERNAL MEDICINE

## 2024-06-18 NOTE — PROGRESS NOTES
Chief Complaint   Patient presents with    Follow-up     2 week follow up hypertension       History of Present Illness    The patient presents for a follow-up related to hypertension. The patient reports that she has had no headaches, chest pain, dyspnea, edema, syncope, blurred vision or palpitations. She states that she is taking her medication as prescribed. She is not having medication side effects.    The patient presents for a follow-up related to depression. She denies currently having depression symptoms. She denies suicidal ideation. Her energy level is good. She denies agorophobia. She sleeps well. She is not tearful. She states that her current depression symptoms are stable. She is currently taking a medication. The current medication regimen consists of sertraline. The patient denies having medication side effects including nausea, headaches, anxiety, increased depression, anorgasmia or fatigue.    Medications      Current Outpatient Medications:     amLODIPine (NORVASC) 5 MG tablet, Take 1 tablet by mouth Daily., Disp: 30 tablet, Rfl: 2    cetirizine (zyrTEC) 10 MG tablet, Take 1 tablet by mouth Daily., Disp: , Rfl:     metoprolol tartrate (LOPRESSOR) 25 MG tablet, Take 1 tablet by mouth 2 (Two) Times a Day., Disp: 60 tablet, Rfl: 5    multivitamin with minerals tablet tablet, Take 1 tablet by mouth Daily., Disp: , Rfl:     pantoprazole (PROTONIX) 40 MG EC tablet, TAKE 1 TABLET BY MOUTH TWICE A DAY FOR 30 DAYS THEN RESUME TO TAKE 1 TABLET BY MOUTH DAILY, Disp: 120 tablet, Rfl: 0    sertraline (ZOLOFT) 25 MG tablet, TAKE 1 TABLET BY MOUTH DAILY, Disp: 90 tablet, Rfl: 0    Triamcinolone Acetonide (NASACORT) 55 MCG/ACT nasal inhaler, 2 sprays into the nostril(s) as directed by provider Daily As Needed., Disp: , Rfl:     triamterene-hydrochlorothiazide (DYAZIDE) 37.5-25 MG per capsule, TAKE 1 CAPSULE BY MOUTH EVERY MORNING, Disp: 90 capsule, Rfl: 1     Allergies    No Known Allergies    Problem  List    Patient Active Problem List   Diagnosis    Essential hypertension    Benign paroxysmal positional vertigo    Gastroesophageal reflux disease with esophagitis without hemorrhage    Chronic allergic rhinitis    Osteopenia of multiple sites       Medications, Allergies, Problems List and Past History were reviewed and updated.    Physical Examination    /78   Pulse 64   Temp 98 °F (36.7 °C) (Infrared)   Resp 16   Wt 64.4 kg (142 lb)   LMP  (LMP Unknown) Comment: LAST PAP 5/2020 BY DR DUARTE  BMI 27.73 kg/m²     Neck: Thyroid- non enlarged, symmetric and has no nodules. No bruits are detected. ROM- Normal Range of Motion with no rigidity.    Lungs: Auscultation- Clear to auscultation bilaterally. There are no retractions, clubbing or cyanosis. The Expiratory to Inspiratory ratio is equal.    Cardiovascular: There are no carotid bruits. Heart- Normal Rate with Regular rhythm and no murmurs. There are no gallops. There are no rubs. In the lower extremities there is no edema. The upper extremities do not have edema.    Abdomen: Soft, benign, non-tender with no masses, hernias, organomegaly or scars.    Impression and Assessment    Essential Hypertension.    Reactive Depression.    Plan    Essential Hypertension Plan: The patient was instructed to continue the current medications.    Reactive Depression Plan: The patient was instructed to continue the current medications.    Diagnoses and all orders for this visit:    1. Essential hypertension (Primary)    2. Reactive depression        Return to Office    The patient was instructed to return for follow-up in 3 months. The patient was instructed to return sooner if the condition changes, worsens, or does not resolve.

## 2024-06-25 ENCOUNTER — OFFICE VISIT (OUTPATIENT)
Dept: ORTHOPEDIC SURGERY | Facility: CLINIC | Age: 69
End: 2024-06-25
Payer: MEDICARE

## 2024-06-25 VITALS
BODY MASS INDEX: 27.88 KG/M2 | SYSTOLIC BLOOD PRESSURE: 164 MMHG | HEIGHT: 60 IN | DIASTOLIC BLOOD PRESSURE: 80 MMHG | WEIGHT: 142 LBS

## 2024-06-25 DIAGNOSIS — S82.142D CLOSED FRACTURE OF LEFT TIBIAL PLATEAU WITH ROUTINE HEALING, SUBSEQUENT ENCOUNTER: ICD-10-CM

## 2024-06-25 DIAGNOSIS — Z09 FRACTURE FOLLOW-UP: Primary | ICD-10-CM

## 2024-06-25 PROCEDURE — 1159F MED LIST DOCD IN RCRD: CPT | Performed by: PHYSICIAN ASSISTANT

## 2024-06-25 PROCEDURE — 1160F RVW MEDS BY RX/DR IN RCRD: CPT | Performed by: PHYSICIAN ASSISTANT

## 2024-06-25 PROCEDURE — 3077F SYST BP >= 140 MM HG: CPT | Performed by: PHYSICIAN ASSISTANT

## 2024-06-25 PROCEDURE — 3079F DIAST BP 80-89 MM HG: CPT | Performed by: PHYSICIAN ASSISTANT

## 2024-06-25 PROCEDURE — 99213 OFFICE O/P EST LOW 20 MIN: CPT | Performed by: PHYSICIAN ASSISTANT

## 2024-06-25 NOTE — PROGRESS NOTES
"    [facility] [place] [facility] Clinic Note        Subjective     CC: Follow-up (5 week follow up --Closed fracture of left tibial plateau, (DOI: 3/24/24))      AUSTEN Cardenas is a 68 y.o. female.  Patient returns for follow-up left lateral tibial plateau fracture.  Wearing hinged brace brace.  She is weightbearing as tolerated.  She is participating in PT.    Pain scale: max 2/10 (back).  Associated symptoms mild discomfort, intermittent swelling, occasional popping.  She still feels some stiffness to the knee and weakness with giving away but that has improved with PT.  She feels she is slowly regaining her strength back.  Majority of her issues are related to her back.       Overall, patient's symptoms are improved.      ROS:    Constiutional:Pt denies fever, chills, nausea, or vomiting.  MSK:as above        Objective      Past Medical History  Past Medical History:   Diagnosis Date    Acute cholecystitis 3/12/2022    GERD (gastroesophageal reflux disease)     Hypertension      Social History     Socioeconomic History    Marital status:    Tobacco Use    Smoking status: Never     Passive exposure: Never    Smokeless tobacco: Never   Vaping Use    Vaping status: Never Used   Substance and Sexual Activity    Alcohol use: Yes     Alcohol/week: 5.0 standard drinks of alcohol     Types: 5 Glasses of wine per week     Comment: 1 glass wine/day    Drug use: No    Sexual activity: Yes     Partners: Male     Birth control/protection: Post-menopausal          Physical Exam  /80   Ht 152.4 cm (60\")   Wt 64.4 kg (142 lb)   LMP  (LMP Unknown) Comment: LAST PAP 5/2020 BY DR DUARTE  BMI 27.73 kg/m²     Body mass index is 27.73 kg/m².    Patient is well nourished and well developed.        Ortho Exam  Left knee  Skin: Intact without any erythema, warmth, swelling.   Motion: 0-120°.  Tenderness: No tenderness on today's exam.  Straight leg raise: Intact.  Motor/sensory: Grossly intact " L2-S1.      Imaging/Labs/EMG Reviewed and Interpreted:  Ordered left knee plain films.  Imaging read/interpreted by Dr. Bonilla.    Imaging Results (Last 24 Hours)       Procedure Component Value Units Date/Time    XR Knee 1 or 2 View Left [[id number]] Resulted: 06/25/24 1443     Updated: 06/25/24 1444    Narrative:      Indication: Follow-up closed treatment lateral plateau fracture    Comparison: Todays xrays were compared to previous xrays from 5/21/2024      Impression:           Left Knee: Radiographs demonstrate consolidation lateral plateau fracture   with no articular incongruity or change in alignment compared to prior   radiographs.  Fracture line no longer visible.              Assessment:  1. Fracture follow-up    2. Closed fracture of left tibial plateau with routine healing, subsequent encounter        Plan:  Follow-up left lateral tibial plateau nondisplaced fracture--stable and healed.  Reviewed imaging with the patient.  May wean out of hinged knee brace.  Continue WBAT, slowly advancing activity as tolerated.  Make continue with formal PT regarding range of motion, stretching and strengthening.   Continue with OTC NSAIDs/pain medication as needed.  Follow up as needed.    Questions and concerns answered.      Cristina Christianson PA-C  06/25/24  14:55 EDT      Dictated Utilizing Dragon Dictation.

## 2024-08-16 NOTE — TELEPHONE ENCOUNTER
Caller: Helga Cardenas    Relationship: Self    Best call back number: [phone]     What is the best time to reach you: ANYTIME    Who are you requesting to speak with (clinical staff, provider,  specific staff member): DR HO    What was the call regarding: PATIENT HAS NOT HEARD THE RESULTS OF HER ENDOSCOPY AND WOULD LIKE THE INPUT OF HER PCP.       [facility]  Inpatient/Observation/Outpatient Rehabilitation    Date: 2024  Patient Name: Christina Sosa       [x] Inpatient Acute/Observation       []  Outpatient  : 1957          [] Pt no showed for scheduled appointment    [] As a reminder, pt was contacted/attempted contact via phone of upcoming appointments.    [x] Pt refused/declined therapy at this time due to: Fatigue.           [] Pt cancelled due to:  [] No Reason Given   [] Sick/ill   [] Other:    [] Evaluation held by RN/Provider due to:    [] High Heart Rate   [] High Blood Pressure   [] Orthopedic Consult   [] Hgb < 7   [] Other:    [] Pt does not require skilled services due to:      Therapist/Assistant will attempt to see this patient, at our earliest opportunity.       Todd Cuevas, PT, DPT Date: 2024

## 2024-08-25 DIAGNOSIS — F32.9 REACTIVE DEPRESSION: ICD-10-CM

## 2024-08-25 DIAGNOSIS — I10 ESSENTIAL HYPERTENSION: ICD-10-CM

## 2024-08-26 ENCOUNTER — OFFICE VISIT (OUTPATIENT)
Dept: ORTHOPEDIC SURGERY | Facility: CLINIC | Age: 69
End: 2024-08-26
Payer: MEDICARE

## 2024-08-26 VITALS
DIASTOLIC BLOOD PRESSURE: 84 MMHG | HEIGHT: 60 IN | SYSTOLIC BLOOD PRESSURE: 124 MMHG | BODY MASS INDEX: 27.64 KG/M2 | WEIGHT: 140.8 LBS

## 2024-08-26 DIAGNOSIS — G56.03 BILATERAL CARPAL TUNNEL SYNDROME: ICD-10-CM

## 2024-08-26 DIAGNOSIS — M65.331 TRIGGER MIDDLE FINGER OF RIGHT HAND: Primary | ICD-10-CM

## 2024-08-26 DIAGNOSIS — M79.641 BILATERAL HAND PAIN: ICD-10-CM

## 2024-08-26 DIAGNOSIS — M79.642 BILATERAL HAND PAIN: ICD-10-CM

## 2024-08-26 DIAGNOSIS — M18.10 ARTHRITIS OF CARPOMETACARPAL (CMC) JOINT OF THUMB: ICD-10-CM

## 2024-08-26 PROCEDURE — 3074F SYST BP LT 130 MM HG: CPT | Performed by: STUDENT IN AN ORGANIZED HEALTH CARE EDUCATION/TRAINING PROGRAM

## 2024-08-26 PROCEDURE — 20550 NJX 1 TENDON SHEATH/LIGAMENT: CPT | Performed by: STUDENT IN AN ORGANIZED HEALTH CARE EDUCATION/TRAINING PROGRAM

## 2024-08-26 PROCEDURE — 1159F MED LIST DOCD IN RCRD: CPT | Performed by: STUDENT IN AN ORGANIZED HEALTH CARE EDUCATION/TRAINING PROGRAM

## 2024-08-26 PROCEDURE — 99204 OFFICE O/P NEW MOD 45 MIN: CPT | Performed by: STUDENT IN AN ORGANIZED HEALTH CARE EDUCATION/TRAINING PROGRAM

## 2024-08-26 PROCEDURE — 1160F RVW MEDS BY RX/DR IN RCRD: CPT | Performed by: STUDENT IN AN ORGANIZED HEALTH CARE EDUCATION/TRAINING PROGRAM

## 2024-08-26 PROCEDURE — 3079F DIAST BP 80-89 MM HG: CPT | Performed by: STUDENT IN AN ORGANIZED HEALTH CARE EDUCATION/TRAINING PROGRAM

## 2024-08-26 RX ORDER — TRIAMCINOLONE ACETONIDE 40 MG/ML
20 INJECTION, SUSPENSION INTRA-ARTICULAR; INTRAMUSCULAR
Status: COMPLETED | OUTPATIENT
Start: 2024-08-26 | End: 2024-08-26

## 2024-08-26 RX ORDER — SERTRALINE HYDROCHLORIDE 25 MG/1
25 TABLET, FILM COATED ORAL DAILY
Qty: 90 TABLET | Refills: 0 | Status: SHIPPED | OUTPATIENT
Start: 2024-08-26

## 2024-08-26 RX ORDER — AMLODIPINE BESYLATE 5 MG/1
5 TABLET ORAL DAILY
Qty: 90 TABLET | Refills: 1 | Status: SHIPPED | OUTPATIENT
Start: 2024-08-26

## 2024-08-26 RX ORDER — LIDOCAINE HYDROCHLORIDE 10 MG/ML
0.5 INJECTION, SOLUTION EPIDURAL; INFILTRATION; INTRACAUDAL; PERINEURAL
Status: COMPLETED | OUTPATIENT
Start: 2024-08-26 | End: 2024-08-26

## 2024-08-26 RX ADMIN — TRIAMCINOLONE ACETONIDE 20 MG: 40 INJECTION, SUSPENSION INTRA-ARTICULAR; INTRAMUSCULAR at 10:03

## 2024-08-26 RX ADMIN — LIDOCAINE HYDROCHLORIDE 0.5 ML: 10 INJECTION, SOLUTION EPIDURAL; INFILTRATION; INTRACAUDAL; PERINEURAL at 10:03

## 2024-08-26 NOTE — PROGRESS NOTES
Procedure   - Hand/Upper Extremity Injection: R long A1 for trigger finger on 8/26/2024 10:03 AM  Indications: pain  Details: 27 G needle, volar approach  Medications: 0.5 mL lidocaine PF 1% 1 %; 20 mg triamcinolone acetonide 40 MG/ML  Outcome: tolerated well, no immediate complications  Procedure, treatment alternatives, risks and benefits explained, specific risks discussed. Consent was given by the patient. Immediately prior to procedure a time out was called to verify the correct patient, procedure, equipment, support staff and site/side marked as required. Patient was prepped and draped in the usual sterile fashion.

## 2024-08-26 NOTE — PROGRESS NOTES
[facility]     Office Visit       Date: 08/26/2024   Patient Name: Helga Cardenas  MRN: [id number]  YOB: 1955    Referring Physician: Rey Desouza MD     Chief Complaint:   Chief Complaint   Patient presents with    Right Hand - Pain, Initial Evaluation     Long finger     History of Present Illness:   Helga Cardenas is a 69 y.o. female right-hand-dominant presented clinic with complaints of right long finger catching and locking, bilateral hand numbness and tingling, and bilateral basilar thumb pain.  Her main complaint today is that of right long finger catching locking.  This is been present for approximately 4 months.  She states that the digit gets caught down requiring manual straightening.  This is worse in the morning.  This is painful and occurs.  She has not attempted any prior bracing, injections, or therapy.  Regarding her bilateral hand numbness and tingling.  She states this is worse on the left as compared to the right.  She feels this is worse in the thumb index and long fingers.  She has not tempted any prior bracing, injections, or nerve studies.  She does feel that this is intermittent and minimally bothersome to her.  Lastly, she complains of intermittent pain to the base of her thumbs.  Right greater than left.  This is very intermittent and and is currently not bothersome to her.    PM: Hypertension, GERD, positional vertigo.    Subjective   Review of Systems:   Review of Systems   Constitutional:  Negative for chills, fever, unexpected weight gain and unexpected weight loss.   HENT:  Negative for congestion, postnasal drip and rhinorrhea.    Eyes:  Negative for blurred vision.   Respiratory:  Negative for shortness of breath.    Cardiovascular:  Negative for leg swelling.   Gastrointestinal:  Negative for abdominal pain, nausea and vomiting.   Genitourinary:  Negative for  "difficulty urinating.   Musculoskeletal:  Positive for arthralgias. Negative for gait problem, joint swelling and myalgias.   Skin:  Negative for skin lesions and wound.   Neurological:  Negative for dizziness, weakness, light-headedness and numbness.   Hematological:  Does not bruise/bleed easily.   Psychiatric/Behavioral:  Negative for depressed mood.       Pertinent review of systems per HPI    I reviewed the patient's chief complaint, history of present illness, review of systems, past medical history, surgical history, family history, social history, medications and allergy list in the EMR on 08/26/2024 and agree with the findings above.    Objective    Vital Signs:   Vitals:    08/26/24 0922   BP: 124/84   Weight: 63.9 kg (140 lb 12.8 oz)   Height: 152.4 cm (60\")     BMI:      General: No acute distress. Alert and oriented.   Cardiovascular: Palpable radial pulse.   Respiratory: Breathing is nonlabored.     Ortho Exam:  Examination of bilateral upper extremities demonstrates prominence of the CMC joints, right greater than left.  No atrophy or wasting.  No skin lesions or abrasions.  She is tender at the right long finger A1 pulley.  There is catching and locking listed.  The remainder of the A1 pulleys are nontender.  She is tender over bilateral thumb CMC joints that is worse volarly.  Positive CMC grind.  No thumb MCP joint hyperextension.  Negative Tinel's at bilateral wrist and elbow.  Positive Durkan's and Phalen's at bilateral wrist.  Sensation is decreased light touch throughout the median nerve distributions bilaterally and intact light touch throughout the ulnar nerve distributions.  She is able to make a full composite fist.  5/5 APB and FDI strength bilaterally.  2-point discrimination left long fingers 3 mm, right long finger 6 mm.  Warm and well-perfused distally.    Imaging / Studies:    Imaging Results (Last 24 Hours)       Procedure Component Value Units Date/Time    XR Hand 2 View Bilateral " [[id number]] Resulted: 08/30/24 0925     Updated: 08/30/24 0926    Addenda:        Addendum:    Views: PA and lateral      Signed: 08/30/24 0926 by Abbi Campbell MD    Narrative:      Bilateral Hand X-Ray    Indication: Pain    Views:  PA, lateral, oblique    Comparison:  None    Findings:  No fractures or dislocation. No bony lesions.Normal soft tissues.   Narrowing of bilateral thumb CMC joint spaces, right greater than left,   with osteophyte formation.  STT joint appears well-maintained.  Mild   arthritic changes noted throughout the PIP and DIP joints.  Evidence of   chondrocalcinosis to bilateral TFCC's.  Cyst formation noted within the   scaphoid's, left greater than right.    Impression:   Degenerative changes to bilateral thumb CMC joint.               Procedure Note:  After reviewing the risks, benefits and alternatives to a steroid injection, which include but are not limited to; hypopigmentation, fat necrosis/atrophy, pain, swelling, bleeding, bruising, damage to nearby nerves/vessels, allergic reaction , transient elevation in blood glucose levels and infection a verbal consent was obtained. A time-out was then performed and the affected hand was prepped with chlorhexadine soap and ethyl chloride was used to numb the skin. The the right long finger flexor tendon sheath was injected with 0.5cc: 0.5cc mixture of Kenalog - 40 mg/ml and Lidocaine - 1% / 2 ml. The injection was well tolerated and a sterile dressing was applied. There were no complications. I advised the patient that they might experience some local discomfort for the next couple days and can apply ice to the site as needed.    Assessment / Plan    Assessment/Plan:   Helga Cardenas is a 69 y.o. female with right long trigger finger, bilateral carpal tunnel syndrome, and bilateral thumb CMC arthritis.    I discussed with the patient their clinical findings demonstrate a trigger finger.  We had a lengthy discussion regarding the  pathophysiology of inflammation of the tendon-sheath unit, using the analogy of a subway tunnel.  Both conservative and surgical options were discussed.  Conservative treatments in the form of: observation, gentle stretching exercises, nighttime coban wrapping, and injection were presented. Discussed that approximately 70% of patients have complete symptoms resolution after 1 steroid injection, and should they fail 1 injection, they may still be considered for a second steroid injection.  Also discussed that the patient may not see any improvement from the steroid injection for sometimes 2 weeks. Operative treatments in the form of A1 pulley release was also discussed.  After expressing understanding of all options, the patient elects to proceed with corticosteroid injection, nighttime Coban wrapping, and gentle stretching.  Injections provided today in clinic and tolerated well.    Additionally, I discussed with the patient their clinical findings demonstrate carpal tunnel syndrome.  The pathophysiology of the condition, including compression of the median nerve in the carpal tunnel, was explained in detail.  It was also discussed that with more severe symptomatology, such as persistent and worsening paresthesias, that permanent nerve damage may result, which may make improvement after surgery less predictable.  Both conservative and surgical options were discussed.  Conservative treatments in the form of: observation, gentle nerve gliding exercises, night time splinting, and injection were presented.  Operative treatment in the form of open carpal tunnel release was presented and reiterated that the goal of surgery is to prevent further compression and damage to the nerve. Further workup with electrodiagnostic studies was also discussed.  The patient states that her symptoms are minimally bothersome to her.  She was agreeable to nighttime splinting and gentle nerve gliding exercises, however would like to hold off  on obtaining an EMG study or corticosteroid injection.    Lastly, I discussed with the patient their clinical and radiographic findings demonstrate basal joint arthritis.  We had a lengthy discussion regarding the pathophysiology of their diagnosis.  Both conservative and surgical options were discussed.  Conservative treatments in the form of: anti-inflammatories, bracing (push meta /CMC comfort cool wrap/Neoprene thumb sleeve), formal hand therapy, and injection were presented.  Operative treatments in the form of trapeziectomy with FCR to APL suspensionplasty was also presented, including the expected postoperative course.  After expressing understanding of all options, the patient elects to proceed with continued observation.  She does not feel that her symptoms are bothersome enough to warrant a CMC wrap or injection today.  I will see her back in 3 months for her trigger finger.  She may return earlier if her carpal tunnel and basal joint arthritis worsen.  They were agreeable with the plan.  All questions and concerns were addressed.       ICD-10-CM ICD-9-CM   1. Trigger middle finger of right hand  M65.331 727.03   2. Bilateral hand pain  M79.641 729.5    M79.642    3. Bilateral carpal tunnel syndrome  G56.03 354.0   4. Arthritis of carpometacarpal (CMC) joint of thumb  M18.10 716.94     Follow Up:   Return in about 3 months (around 11/26/2024) for Follow Up.      Abbi Campbell MD  [facility]

## 2024-08-28 DIAGNOSIS — I10 ESSENTIAL HYPERTENSION: ICD-10-CM

## 2024-08-28 RX ORDER — AMLODIPINE BESYLATE 5 MG/1
5 TABLET ORAL DAILY
Qty: 90 TABLET | Refills: 1 | OUTPATIENT
Start: 2024-08-28

## 2024-09-05 ENCOUNTER — TELEPHONE (OUTPATIENT)
Dept: INTERNAL MEDICINE | Facility: CLINIC | Age: 69
End: 2024-09-05
Payer: MEDICARE

## 2024-09-05 NOTE — TELEPHONE ENCOUNTER
Maged with [facility] believes she has a Lis Franc injury in her foot. Her foot pain is still worse than her knee and back pain and her weight bearing is limited with some improvement but not better after 2 months of PT. He thinks she needs additional evaluation.

## 2024-09-09 NOTE — TELEPHONE ENCOUNTER
Spoke with patient, appointment scheduled for Monday, 9/16 at 1pm with Dr Desouza.  Verbalized appreciation.

## 2024-09-16 ENCOUNTER — OFFICE VISIT (OUTPATIENT)
Dept: INTERNAL MEDICINE | Facility: CLINIC | Age: 69
End: 2024-09-16
Payer: MEDICARE

## 2024-09-16 ENCOUNTER — HOSPITAL ENCOUNTER (OUTPATIENT)
Dept: GENERAL RADIOLOGY | Facility: HOSPITAL | Age: 69
Discharge: HOME | End: 2024-09-16
Admitting: INTERNAL MEDICINE
Payer: MEDICARE

## 2024-09-16 VITALS
RESPIRATION RATE: 16 BRPM | TEMPERATURE: 97.7 F | SYSTOLIC BLOOD PRESSURE: 142 MMHG | BODY MASS INDEX: 27.98 KG/M2 | DIASTOLIC BLOOD PRESSURE: 82 MMHG | HEART RATE: 60 BPM | WEIGHT: 143.25 LBS

## 2024-09-16 DIAGNOSIS — M79.672 LEFT FOOT PAIN: ICD-10-CM

## 2024-09-16 DIAGNOSIS — M79.672 LEFT FOOT PAIN: Primary | ICD-10-CM

## 2024-09-16 PROCEDURE — 1126F AMNT PAIN NOTED NONE PRSNT: CPT | Performed by: INTERNAL MEDICINE

## 2024-09-16 PROCEDURE — 3077F SYST BP >= 140 MM HG: CPT | Performed by: INTERNAL MEDICINE

## 2024-09-16 PROCEDURE — 73630 X-RAY EXAM OF FOOT: CPT

## 2024-09-16 PROCEDURE — 3079F DIAST BP 80-89 MM HG: CPT | Performed by: INTERNAL MEDICINE

## 2024-09-16 PROCEDURE — 99213 OFFICE O/P EST LOW 20 MIN: CPT | Performed by: INTERNAL MEDICINE

## 2024-09-19 ENCOUNTER — OFFICE VISIT (OUTPATIENT)
Dept: INTERNAL MEDICINE | Facility: CLINIC | Age: 69
End: 2024-09-19
Payer: MEDICARE

## 2024-09-19 VITALS
SYSTOLIC BLOOD PRESSURE: 128 MMHG | RESPIRATION RATE: 16 BRPM | WEIGHT: 143 LBS | BODY MASS INDEX: 27.93 KG/M2 | TEMPERATURE: 97.7 F | DIASTOLIC BLOOD PRESSURE: 64 MMHG | HEART RATE: 60 BPM

## 2024-09-19 DIAGNOSIS — M79.672 LEFT FOOT PAIN: ICD-10-CM

## 2024-09-19 DIAGNOSIS — R73.02 IMPAIRED GLUCOSE TOLERANCE: ICD-10-CM

## 2024-09-19 DIAGNOSIS — K21.9 GASTROESOPHAGEAL REFLUX DISEASE WITHOUT ESOPHAGITIS: ICD-10-CM

## 2024-09-19 DIAGNOSIS — I10 ESSENTIAL HYPERTENSION: Primary | ICD-10-CM

## 2024-09-19 DIAGNOSIS — F32.9 REACTIVE DEPRESSION: ICD-10-CM

## 2024-09-19 LAB
ALBUMIN SERPL-MCNC: 4.2 G/DL (ref 3.5–5.2)
ALBUMIN/GLOB SERPL: 1.6 G/DL
ALP SERPL-CCNC: 132 U/L (ref 39–117)
ALT SERPL W P-5'-P-CCNC: 26 U/L (ref 1–33)
ANION GAP SERPL CALCULATED.3IONS-SCNC: 8.4 MMOL/L (ref 5–15)
AST SERPL-CCNC: 24 U/L (ref 1–32)
BILIRUB SERPL-MCNC: 0.3 MG/DL (ref 0–1.2)
BUN SERPL-MCNC: 14 MG/DL (ref 8–23)
BUN/CREAT SERPL: 22.6 (ref 7–25)
CALCIUM SPEC-SCNC: 9.5 MG/DL (ref 8.6–10.5)
CHLORIDE SERPL-SCNC: 96 MMOL/L (ref 98–107)
CO2 SERPL-SCNC: 31.6 MMOL/L (ref 22–29)
CREAT SERPL-MCNC: 0.62 MG/DL (ref 0.57–1)
EGFRCR SERPLBLD CKD-EPI 2021: 97.1 ML/MIN/1.73
GLOBULIN UR ELPH-MCNC: 2.6 GM/DL
GLUCOSE SERPL-MCNC: 112 MG/DL (ref 65–99)
HBA1C MFR BLD: 6.8 % (ref 4.8–5.6)
POTASSIUM SERPL-SCNC: 4.4 MMOL/L (ref 3.5–5.2)
PROT SERPL-MCNC: 6.8 G/DL (ref 6–8.5)
SODIUM SERPL-SCNC: 136 MMOL/L (ref 136–145)

## 2024-09-19 PROCEDURE — 80053 COMPREHEN METABOLIC PANEL: CPT | Performed by: INTERNAL MEDICINE

## 2024-09-19 PROCEDURE — 3078F DIAST BP <80 MM HG: CPT | Performed by: INTERNAL MEDICINE

## 2024-09-19 PROCEDURE — 1126F AMNT PAIN NOTED NONE PRSNT: CPT | Performed by: INTERNAL MEDICINE

## 2024-09-19 PROCEDURE — 83036 HEMOGLOBIN GLYCOSYLATED A1C: CPT | Performed by: INTERNAL MEDICINE

## 2024-09-19 PROCEDURE — 3074F SYST BP LT 130 MM HG: CPT | Performed by: INTERNAL MEDICINE

## 2024-09-19 PROCEDURE — 99214 OFFICE O/P EST MOD 30 MIN: CPT | Performed by: INTERNAL MEDICINE

## 2024-10-16 ENCOUNTER — OFFICE VISIT (OUTPATIENT)
Dept: ORTHOPEDIC SURGERY | Facility: CLINIC | Age: 69
End: 2024-10-16
Payer: MEDICARE

## 2024-10-16 VITALS
DIASTOLIC BLOOD PRESSURE: 66 MMHG | HEIGHT: 60 IN | WEIGHT: 145 LBS | BODY MASS INDEX: 28.47 KG/M2 | SYSTOLIC BLOOD PRESSURE: 154 MMHG

## 2024-10-16 DIAGNOSIS — M79.672 LEFT FOOT PAIN: Primary | ICD-10-CM

## 2024-10-16 DIAGNOSIS — M19.072 ARTHRITIS OF LEFT FOOT: ICD-10-CM

## 2024-10-16 RX ORDER — IPRATROPIUM BROMIDE 21 UG/1
SPRAY, METERED NASAL
COMMUNITY

## 2024-10-16 NOTE — PROGRESS NOTES
[facility] Office Visit - Carmenza West PA-C    Office Visit       Patient Name: Helga Cardenas    Chief Complaint:   Chief Complaint   Patient presents with   • Left Foot - Pain       Referring Physician: Rey Desouza MD    History of Present Illness:   Helga Cardenas is a 69 y.o. female who presents to discuss her left foot pain.  She denies any trauma or injury.  She complains of dorsal foot pain since April 2023.  It is worse with weightbearing and walking.  Describes it as aching and dull and 3/10.  Is worse with shoe pressure.  It began after she was nonweightbearing for tibial plateau fracture.  She denies any swelling.  She has treated with shoewear change and over-the-counter orthotics.      Subjective     Review of Systems   Constitutional: Negative.  Negative for chills, fatigue and fever.   HENT: Negative.  Negative for congestion and dental problem.    Eyes: Negative.  Negative for blurred vision.   Respiratory: Negative.  Negative for shortness of breath.    Cardiovascular: Negative.  Negative for leg swelling.   Gastrointestinal: Negative.  Negative for abdominal pain.   Endocrine: Negative.  Negative for polyuria.   Genitourinary: Negative.  Negative for difficulty urinating.   Musculoskeletal:  Positive for arthralgias.   Skin: Negative.    Allergic/Immunologic: Negative.    Neurological: Negative.    Hematological: Negative.  Negative for adenopathy.   Psychiatric/Behavioral: Negative.  Negative for behavioral problems.         Past Medical History:   Past Medical History:   Diagnosis Date   • Acute cholecystitis 3/12/2022   • GERD (gastroesophageal reflux disease)    • Hypertension        Past Surgical History:   Past Surgical History:   Procedure Laterality Date   • CHOLECYSTECTOMY  3/ 12/22   • CHOLECYSTECTOMY WITH INTRAOPERATIVE CHOLANGIOGRAM N/A 03/13/2022    Procedure: CHOLECYSTECTOMY LAPAROSCOPIC;   Surgeon: Harshad Hdz MD;  Location:  [facility];  Service: General;  Laterality: N/A;   • COLONOSCOPY      2011   • ERCP N/A 03/12/2022    Procedure: ENDOSCOPIC RETROGRADE CHOLANGIOPANCREATOGRAPHY;  Surgeon: Brunner, Mark I, MD;  Location:  [facility];  Service: Gastroenterology;  Laterality: N/A;  with sphincterotomy and balloon sweep with 9mm-12mm balloon     • HYSTEROSCOPY ENDOMETRIAL ABLATION     • TONSILLECTOMY      As a child       Family History:   Family History   Problem Relation Age of Onset   • Diabetes type II Mother    • Hypertension Father    • Breast cancer Neg Hx    • Ovarian cancer Neg Hx        Social History:   Social History     Socioeconomic History   • Marital status:    Tobacco Use   • Smoking status: Never     Passive exposure: Never   • Smokeless tobacco: Never   Vaping Use   • Vaping status: Never Used   Substance and Sexual Activity   • Alcohol use: Yes     Alcohol/week: 5.0 standard drinks of alcohol     Types: 5 Glasses of wine per week     Comment: 1 glass wine/day   • Drug use: No   • Sexual activity: Yes     Partners: Male     Birth control/protection: Post-menopausal       Medications:   Current Outpatient Medications:   •  amLODIPine (NORVASC) 5 MG tablet, TAKE 1 TABLET BY MOUTH DAILY, Disp: 90 tablet, Rfl: 1  •  cetirizine (zyrTEC) 10 MG tablet, Take 1 tablet by mouth Daily., Disp: , Rfl:   •  ipratropium (ATROVENT) 0.03 % nasal spray, , Disp: , Rfl:   •  metoprolol tartrate (LOPRESSOR) 25 MG tablet, Take 1 tablet by mouth 2 (Two) Times a Day., Disp: 60 tablet, Rfl: 5  •  multivitamin with minerals tablet tablet, Take 1 tablet by mouth Daily., Disp: , Rfl:   •  pantoprazole (PROTONIX) 40 MG EC tablet, TAKE 1 TABLET BY MOUTH TWICE A DAY FOR 30 DAYS THEN RESUME TO TAKE 1 TABLET BY MOUTH DAILY, Disp: 120 tablet, Rfl: 0  •  sertraline (ZOLOFT) 25 MG tablet, TAKE 1 TABLET BY MOUTH DAILY, Disp: 90 tablet, Rfl: 0  •  Triamcinolone Acetonide (NASACORT) 55 MCG/ACT nasal  "inhaler, Administer 2 sprays into the nostril(s) as directed by provider Daily As Needed., Disp: , Rfl:   •  triamterene-hydrochlorothiazide (DYAZIDE) 37.5-25 MG per capsule, TAKE 1 CAPSULE BY MOUTH EVERY MORNING, Disp: 90 capsule, Rfl: 1    Allergies: No Known Allergies    I have reviewed and updated the following portions of the patient's history and review of systems: allergies, current medications, past family history, past medical history, past social history, past surgical history and problem list.    Objective      Vital Signs:   Vitals:    10/16/24 1036   BP: 154/66   Weight: 65.8 kg (145 lb)   Height: 152.4 cm (60\")       Ortho Exam:  Left foot exam: Tender over the talonavicular joint and TMT joints.  No swelling.  Normal range of motion of the foot and ankle.  5/5 motor strength.  Neurovascular intact distally.  Pulses 2+.    Results Review:   XR Foot 2 View Left  Standing AP lateral left foot ordered for pain, shows narrowing of the   tarsometatarsal joints, no fractures, compared to 9/16/2024         Assessment / Plan      Assessment:  Diagnoses and all orders for this visit:    1. Left foot pain (Primary)  -     XR Foot 2 View Left    2. Arthritis of left foot        Quality Metrics:   BMI:   BMI is >= 25 and <30. (Overweight) The following options were offered after discussion;: Information on healthy weight added to patient's after visit summary.       Tobacco:   Helga Molina [facility]  reports that she has never smoked. She has never been exposed to tobacco smoke. She has never used smokeless tobacco.   Plan:  Left talonavicular and TMT arthritis.  Reviewed today's x-rays clinical findings past and current treatment the patient.  I think her pain and functional mentations are secondary arthritis.  We discussed further treatment including custom orthotics to support her joints, changing the lacing on her shoes to avoid pressure over the midfoot, topical anti-inflammatories as well as oral and " cortisone injection in the future if needed.  I have given her prescription for orthotics.  She will return to see me as needed.    Patient history, diagnosis and treatment plan discussed with Dr. Serna.            Carmenza West PA-C  [facility]       Dictated using Dragon Speech Recognition.

## 2024-10-25 DIAGNOSIS — I10 ESSENTIAL HYPERTENSION: ICD-10-CM

## 2024-10-25 RX ORDER — METOPROLOL TARTRATE 25 MG/1
25 TABLET, FILM COATED ORAL 2 TIMES DAILY
Qty: 180 TABLET | Refills: 1 | Status: SHIPPED | OUTPATIENT
Start: 2024-10-25

## 2024-10-25 RX ORDER — TRIAMTERENE AND HYDROCHLOROTHIAZIDE 37.5; 25 MG/1; MG/1
1 CAPSULE ORAL EVERY MORNING
Qty: 90 CAPSULE | Refills: 1 | Status: SHIPPED | OUTPATIENT
Start: 2024-10-25

## 2024-11-04 DIAGNOSIS — F32.9 REACTIVE DEPRESSION: ICD-10-CM

## 2024-11-04 DIAGNOSIS — I10 ESSENTIAL HYPERTENSION: ICD-10-CM

## 2024-11-04 RX ORDER — METOPROLOL TARTRATE 25 MG/1
25 TABLET, FILM COATED ORAL 2 TIMES DAILY
Qty: 180 TABLET | Refills: 1 | Status: SHIPPED | OUTPATIENT
Start: 2024-11-04

## 2024-11-04 RX ORDER — SERTRALINE HYDROCHLORIDE 25 MG/1
25 TABLET, FILM COATED ORAL DAILY
Qty: 90 TABLET | Refills: 1 | Status: SHIPPED | OUTPATIENT
Start: 2024-11-04

## 2024-11-26 DIAGNOSIS — F32.9 REACTIVE DEPRESSION: ICD-10-CM

## 2024-11-26 RX ORDER — SERTRALINE HYDROCHLORIDE 25 MG/1
25 TABLET, FILM COATED ORAL DAILY
Qty: 90 TABLET | Refills: 1 | Status: SHIPPED | OUTPATIENT
Start: 2024-11-26

## 2025-02-20 DIAGNOSIS — I10 ESSENTIAL HYPERTENSION: ICD-10-CM

## 2025-02-20 RX ORDER — AMLODIPINE BESYLATE 5 MG/1
5 TABLET ORAL DAILY
Qty: 90 TABLET | Refills: 1 | Status: SHIPPED | OUTPATIENT
Start: 2025-02-20

## 2025-03-06 DIAGNOSIS — I10 ESSENTIAL HYPERTENSION: ICD-10-CM

## 2025-03-06 RX ORDER — TRIAMTERENE AND HYDROCHLOROTHIAZIDE 37.5; 25 MG/1; MG/1
1 CAPSULE ORAL EVERY MORNING
Qty: 90 CAPSULE | Refills: 1 | Status: SHIPPED | OUTPATIENT
Start: 2025-03-06

## 2025-04-03 ENCOUNTER — OFFICE VISIT (OUTPATIENT)
Dept: INTERNAL MEDICINE | Facility: CLINIC | Age: 70
End: 2025-04-03
Payer: MEDICARE

## 2025-04-03 VITALS
TEMPERATURE: 97.3 F | DIASTOLIC BLOOD PRESSURE: 80 MMHG | RESPIRATION RATE: 16 BRPM | BODY MASS INDEX: 28.71 KG/M2 | SYSTOLIC BLOOD PRESSURE: 142 MMHG | WEIGHT: 147 LBS | HEART RATE: 56 BPM

## 2025-04-03 DIAGNOSIS — F32.9 REACTIVE DEPRESSION: ICD-10-CM

## 2025-04-03 DIAGNOSIS — I10 ESSENTIAL HYPERTENSION: Primary | ICD-10-CM

## 2025-04-03 DIAGNOSIS — K21.9 GASTROESOPHAGEAL REFLUX DISEASE WITHOUT ESOPHAGITIS: ICD-10-CM

## 2025-04-03 DIAGNOSIS — R73.02 IMPAIRED GLUCOSE TOLERANCE: ICD-10-CM

## 2025-04-03 LAB
ALBUMIN SERPL-MCNC: 4.3 G/DL (ref 3.5–5.2)
ALBUMIN/GLOB SERPL: 1.4 G/DL
ALP SERPL-CCNC: 132 U/L (ref 39–117)
ALT SERPL W P-5'-P-CCNC: 19 U/L (ref 1–33)
ANION GAP SERPL CALCULATED.3IONS-SCNC: 9.8 MMOL/L (ref 5–15)
AST SERPL-CCNC: 24 U/L (ref 1–32)
BILIRUB BLD-MCNC: NEGATIVE MG/DL
BILIRUB SERPL-MCNC: 0.4 MG/DL (ref 0–1.2)
BUN SERPL-MCNC: 15 MG/DL (ref 8–23)
BUN/CREAT SERPL: 23.4 (ref 7–25)
CALCIUM SPEC-SCNC: 9.9 MG/DL (ref 8.6–10.5)
CHLORIDE SERPL-SCNC: 95 MMOL/L (ref 98–107)
CLARITY, POC: CLEAR
CO2 SERPL-SCNC: 31.2 MMOL/L (ref 22–29)
COLOR UR: YELLOW
CREAT SERPL-MCNC: 0.64 MG/DL (ref 0.57–1)
EGFRCR SERPLBLD CKD-EPI 2021: 96.4 ML/MIN/1.73
EXPIRATION DATE: NORMAL
GLOBULIN UR ELPH-MCNC: 3 GM/DL
GLUCOSE SERPL-MCNC: 95 MG/DL (ref 65–99)
GLUCOSE UR STRIP-MCNC: NEGATIVE MG/DL
HBA1C MFR BLD: 7.1 % (ref 4.8–5.6)
KETONES UR QL: NEGATIVE
LEUKOCYTE EST, POC: NEGATIVE
Lab: NORMAL
NITRITE UR-MCNC: NEGATIVE MG/ML
PH UR: 8 [PH] (ref 5–8)
POTASSIUM SERPL-SCNC: 4.5 MMOL/L (ref 3.5–5.2)
PROT SERPL-MCNC: 7.3 G/DL (ref 6–8.5)
PROT UR STRIP-MCNC: NEGATIVE MG/DL
RBC # UR STRIP: NEGATIVE /UL
SODIUM SERPL-SCNC: 136 MMOL/L (ref 136–145)
SP GR UR: 1.01 (ref 1–1.03)
UROBILINOGEN UR QL: NORMAL

## 2025-04-03 PROCEDURE — 83036 HEMOGLOBIN GLYCOSYLATED A1C: CPT | Performed by: INTERNAL MEDICINE

## 2025-04-03 PROCEDURE — 80053 COMPREHEN METABOLIC PANEL: CPT | Performed by: INTERNAL MEDICINE

## 2025-04-03 RX ORDER — LOSARTAN POTASSIUM 25 MG/1
25 TABLET ORAL DAILY
Qty: 30 TABLET | Refills: 2 | Status: SHIPPED | OUTPATIENT
Start: 2025-04-03

## 2025-04-03 RX ORDER — OFLOXACIN 3 MG/ML
5 SOLUTION AURICULAR (OTIC) 2 TIMES DAILY
Qty: 10 ML | Refills: 0 | Status: SHIPPED | OUTPATIENT
Start: 2025-04-03

## 2025-04-03 NOTE — PROGRESS NOTES
Chief Complaint   Patient presents with    Follow-up     6 month follow up chronic medical problems       History of Present Illness     The patient presents for a follow-up related to hypertension. The patient reports that she has headaches almost every day as the day goes on. Denies chest pain, dyspnea, edema, syncope, blurred vision or palpitations. She states that she is taking her medication as prescribed. She is not having medication side effects.     The patient presents for a follow-up related to depression. She denies currently having depression symptoms. She denies suicidal ideation. Her energy level is good. She denies agorophobia. She sleeps well. She is not tearful. She states that her current depression symptoms are stable. She is currently taking a medication. The current medication regimen consists of sertraline. The patient denies having medication side effects including nausea, headaches, anxiety, increased depression, anorgasmia or fatigue.    The patient presents for a follow-up related to GERD. The patient is on pantoprazole for her gastroesophageal reflux. The medication is taken on a regular basis and gives complete relief of the symptoms. She reports no abdominal pain, belching, diarrhea, dysphagia, early satiety, heartburn, hoarseness, nausea, odynophagia, rectal bleeding, vomiting or weight loss. The GERD has no known aggravating factors.      The patient presents for follow up for impaired glucose tolerance.    Medications      Current Outpatient Medications:     amLODIPine (NORVASC) 5 MG tablet, TAKE 1 TABLET BY MOUTH DAILY, Disp: 90 tablet, Rfl: 1    cetirizine (zyrTEC) 10 MG tablet, Take 1 tablet by mouth Daily., Disp: , Rfl:     ipratropium (ATROVENT) 0.03 % nasal spray, , Disp: , Rfl:     metoprolol tartrate (LOPRESSOR) 25 MG tablet, TAKE 1 TABLET BY MOUTH 2 TIMES A DAY, Disp: 180 tablet, Rfl: 1    multivitamin with minerals tablet tablet, Take 1 tablet by mouth Daily., Disp: , Rfl:      pantoprazole (PROTONIX) 40 MG EC tablet, TAKE 1 TABLET BY MOUTH TWICE A DAY FOR 30 DAYS THEN RESUME TO TAKE 1 TABLET BY MOUTH DAILY, Disp: 120 tablet, Rfl: 0    sertraline (ZOLOFT) 25 MG tablet, TAKE 1 TABLET BY MOUTH DAILY, Disp: 90 tablet, Rfl: 1    Triamcinolone Acetonide (NASACORT) 55 MCG/ACT nasal inhaler, Administer 2 sprays into the nostril(s) as directed by provider Daily As Needed., Disp: , Rfl:     triamterene-hydrochlorothiazide (DYAZIDE) 37.5-25 MG per capsule, TAKE 1 CAPSULE BY MOUTH EVERY MORNING, Disp: 90 capsule, Rfl: 1     Allergies    No Known Allergies    Problem List    Patient Active Problem List   Diagnosis    Essential hypertension    Benign paroxysmal positional vertigo    Gastroesophageal reflux disease with esophagitis without hemorrhage    Chronic allergic rhinitis    Osteopenia of multiple sites    Trigger middle finger of right hand    Bilateral carpal tunnel syndrome    Arthritis of carpometacarpal (CMC) joint of thumb       Medications, Allergies, Problems List and Past History were reviewed and updated.    Physical Examination    /84 (BP Location: Left arm, Patient Position: Sitting, Cuff Size: Adult)   Pulse 56   Temp 97.3 °F (36.3 °C) (Infrared)   Resp 16   Wt 66.7 kg (147 lb)   LMP  (LMP Unknown) Comment: LAST PAP 5/2020 BY DR DUARTE  BMI 28.71 kg/m²    Physical Exam  Constitutional:       General: She is not in acute distress.     Appearance: She is not ill-appearing, toxic-appearing or diaphoretic.   HENT:      Right Ear: Drainage present. A PE tube is present.      Left Ear: Hearing, tympanic membrane, ear canal and external ear normal.   Cardiovascular:      Rate and Rhythm: Normal rate and regular rhythm.      Pulses: Normal pulses.      Heart sounds: Normal heart sounds. No murmur heard.     No friction rub. No gallop.   Neurological:      Mental Status: She is alert.   Psychiatric:         Mood and Affect: Mood normal.         Behavior: Behavior normal.          Thought Content: Thought content normal.         Judgment: Judgment normal.      Diagnoses and all orders for this visit:    1. Essential hypertension (Primary)  -     Comprehensive Metabolic Panel; Future  -     POC Urinalysis Dipstick, Automated; Future  -     losartan (Cozaar) 25 MG tablet; Take 1 tablet by mouth Daily.  Dispense: 30 tablet; Refill: 2    2. Reactive depression    3. Gastroesophageal reflux disease without esophagitis    4. Impaired glucose tolerance  -     Hemoglobin A1c; Future    Other orders  -     ofloxacin (FLOXIN) 0.3 % otic solution; Administer 5 drops to the right ear 2 (Two) Times a Day.  Dispense: 10 mL; Refill: 0          Continue on prescribed medications. Referred to ENT. Follow up in clinic in 6 months for follow up of chronic conditions and medicare wellness. If symptoms worsen or persist contact the office. '      Attending Note:   Patient was personally seen and examined by me.  I have obtained and corroborated the history and examination as documented above, and agree with the accuracy of the documented information.  All orders were reviewed and personally signed by me.   Rey Desouza MD  10:20 EDT  04/03/25

## 2025-04-16 ENCOUNTER — OFFICE VISIT (OUTPATIENT)
Dept: INTERNAL MEDICINE | Facility: CLINIC | Age: 70
End: 2025-04-16
Payer: MEDICARE

## 2025-04-16 VITALS
BODY MASS INDEX: 29.06 KG/M2 | OXYGEN SATURATION: 98 % | DIASTOLIC BLOOD PRESSURE: 72 MMHG | HEART RATE: 80 BPM | SYSTOLIC BLOOD PRESSURE: 136 MMHG | RESPIRATION RATE: 17 BRPM | WEIGHT: 148 LBS | HEIGHT: 60 IN

## 2025-04-16 DIAGNOSIS — E11.9 TYPE 2 DIABETES MELLITUS WITHOUT COMPLICATION, WITHOUT LONG-TERM CURRENT USE OF INSULIN: ICD-10-CM

## 2025-04-16 DIAGNOSIS — I10 ESSENTIAL HYPERTENSION: Primary | ICD-10-CM

## 2025-04-17 NOTE — PROGRESS NOTES
Chief Complaint   Patient presents with    Hypertension    Eye Pain     LT eye pain, bruising, x2wk       History of Present Illness      The patient presents for a follow-up related to hypertension. The patient reports that she has had no headaches, chest pain, dyspnea, edema, syncope, blurred vision or palpitations. She states that she is taking her medication as prescribed. She is not having medication side effects.    The patient presents for a follow-up related to Type 2 Diabetes Mellitus. She denies hypoglycemic symptoms. The patient denies polyuria, polydypsia or polyphagia. She reports no symptoms of neuropathy. The patient is not on medication for her Type 2 Diabetes Mellitus. The patient does check her feet daily at home. She denies orthopnea, paroxysmal nocturnal dyspnea or dyspnea on exertion.    Medications      Current Outpatient Medications:     amLODIPine (NORVASC) 5 MG tablet, TAKE 1 TABLET BY MOUTH DAILY, Disp: 90 tablet, Rfl: 1    cetirizine (zyrTEC) 10 MG tablet, Take 1 tablet by mouth Daily., Disp: , Rfl:     ipratropium (ATROVENT) 0.03 % nasal spray, , Disp: , Rfl:     losartan (Cozaar) 25 MG tablet, Take 1 tablet by mouth Daily., Disp: 30 tablet, Rfl: 2    metoprolol tartrate (LOPRESSOR) 25 MG tablet, TAKE 1 TABLET BY MOUTH 2 TIMES A DAY, Disp: 180 tablet, Rfl: 1    multivitamin with minerals tablet tablet, Take 1 tablet by mouth Daily., Disp: , Rfl:     ofloxacin (FLOXIN) 0.3 % otic solution, Administer 5 drops to the right ear 2 (Two) Times a Day., Disp: 10 mL, Rfl: 0    pantoprazole (PROTONIX) 40 MG EC tablet, TAKE 1 TABLET BY MOUTH TWICE A DAY FOR 30 DAYS THEN RESUME TO TAKE 1 TABLET BY MOUTH DAILY, Disp: 120 tablet, Rfl: 0    sertraline (ZOLOFT) 25 MG tablet, TAKE 1 TABLET BY MOUTH DAILY, Disp: 90 tablet, Rfl: 1    Triamcinolone Acetonide (NASACORT) 55 MCG/ACT nasal inhaler, Administer 2 sprays into the nostril(s) as directed by provider Daily As Needed., Disp: , Rfl:      "triamterene-hydrochlorothiazide (DYAZIDE) 37.5-25 MG per capsule, TAKE 1 CAPSULE BY MOUTH EVERY MORNING, Disp: 90 capsule, Rfl: 1    metFORMIN (GLUCOPHAGE) 500 MG tablet, Take 1 tablet by mouth 2 (Two) Times a Day With Meals., Disp: 30 tablet, Rfl: 4     Allergies    No Known Allergies    Problem List    Patient Active Problem List   Diagnosis    Essential hypertension    Benign paroxysmal positional vertigo    Gastroesophageal reflux disease with esophagitis without hemorrhage    Chronic allergic rhinitis    Osteopenia of multiple sites    Trigger middle finger of right hand    Bilateral carpal tunnel syndrome    Arthritis of carpometacarpal (CMC) joint of thumb       Medications, Allergies, Problems List and Past History were reviewed and updated.    Physical Examination    /72   Pulse 80   Resp 17   Ht 152.4 cm (60\")   Wt 67.1 kg (148 lb)   LMP  (LMP Unknown) Comment: LAST PAP 5/2020 BY DR DUARTE  SpO2 98%   BMI 28.90 kg/m²       HEENT: Facies- Within normal limits. Lids- Normal bilaterally. Conjunctiva- Clear bilaterally. Sclera- Anicteric bilaterally.    Neck: Thyroid- non enlarged, symmetric and has no nodules. No bruits are detected.    Lungs: Auscultation- Clear to auscultation bilaterally. There are no retractions, clubbing or cyanosis. The Expiratory to Inspiratory ratio is equal.    Cardiovascular: There are no carotid bruits. Heart- Normal Rate with Regular rhythm and no murmurs. There are no gallops. There are no rubs. In the lower extremities there is no edema. The upper extremities do not have edema.      Abdomen: Soft, benign, non-tender with no masses, hernias, organomegaly or scars.      Impression and Assessment    Essential Hypertension.    Type 2 Diabetes Mellitus without complication without insulin usage.    Plan    Essential Hypertension Plan: The patient was instructed to continue the current medications.    Type 2 Diabetes Mellitus without complication without insulin usage " Plan: Medication will be added as noted.    Diagnoses and all orders for this visit:    1. Essential hypertension (Primary)  -     metFORMIN (GLUCOPHAGE) 500 MG tablet; Take 1 tablet by mouth 2 (Two) Times a Day With Meals.  Dispense: 30 tablet; Refill: 4    2. Type 2 diabetes mellitus without complication, without long-term current use of insulin  -     metFORMIN (GLUCOPHAGE) 500 MG tablet; Take 1 tablet by mouth 2 (Two) Times a Day With Meals.  Dispense: 30 tablet; Refill: 4            Return to Office    The patient was instructed to return for follow-up in 4 months. The patient was instructed to return sooner if the condition changes, worsens, or does not resolve.

## 2025-04-28 ENCOUNTER — OFFICE VISIT (OUTPATIENT)
Dept: INTERNAL MEDICINE | Facility: CLINIC | Age: 70
End: 2025-04-28
Payer: MEDICARE

## 2025-04-28 ENCOUNTER — HOSPITAL ENCOUNTER (OUTPATIENT)
Dept: CT IMAGING | Facility: HOSPITAL | Age: 70
Discharge: HOME | End: 2025-04-28
Admitting: INTERNAL MEDICINE
Payer: MEDICARE

## 2025-04-28 VITALS
WEIGHT: 145.8 LBS | RESPIRATION RATE: 18 BRPM | OXYGEN SATURATION: 97 % | DIASTOLIC BLOOD PRESSURE: 66 MMHG | BODY MASS INDEX: 28.47 KG/M2 | TEMPERATURE: 97.8 F | HEART RATE: 59 BPM | SYSTOLIC BLOOD PRESSURE: 112 MMHG

## 2025-04-28 DIAGNOSIS — R10.9 RIGHT FLANK PAIN: ICD-10-CM

## 2025-04-28 DIAGNOSIS — R10.9 RIGHT FLANK PAIN: Primary | ICD-10-CM

## 2025-04-28 LAB
ALBUMIN SERPL-MCNC: 4.3 G/DL (ref 3.5–5.2)
ALBUMIN/GLOB SERPL: 1.3 G/DL
ALP SERPL-CCNC: 113 U/L (ref 39–117)
ALT SERPL W P-5'-P-CCNC: 19 U/L (ref 1–33)
ANION GAP SERPL CALCULATED.3IONS-SCNC: 9.8 MMOL/L (ref 5–15)
AST SERPL-CCNC: 22 U/L (ref 1–32)
BASOPHILS # BLD AUTO: 0.06 10*3/MM3 (ref 0–0.2)
BASOPHILS NFR BLD AUTO: 0.5 % (ref 0–1.5)
BILIRUB BLD-MCNC: NEGATIVE MG/DL
BILIRUB SERPL-MCNC: 0.5 MG/DL (ref 0–1.2)
BUN SERPL-MCNC: 14 MG/DL (ref 8–23)
BUN/CREAT SERPL: 24.6 (ref 7–25)
CALCIUM SPEC-SCNC: 9.7 MG/DL (ref 8.6–10.5)
CHLORIDE SERPL-SCNC: 91 MMOL/L (ref 98–107)
CLARITY, POC: CLEAR
CO2 SERPL-SCNC: 30.2 MMOL/L (ref 22–29)
COLOR UR: YELLOW
CREAT SERPL-MCNC: 0.57 MG/DL (ref 0.57–1)
DEPRECATED RDW RBC AUTO: 42.6 FL (ref 37–54)
EGFRCR SERPLBLD CKD-EPI 2021: 99.1 ML/MIN/1.73
EOSINOPHIL # BLD AUTO: 0.16 10*3/MM3 (ref 0–0.4)
EOSINOPHIL NFR BLD AUTO: 1.5 % (ref 0.3–6.2)
ERYTHROCYTE [DISTWIDTH] IN BLOOD BY AUTOMATED COUNT: 13.9 % (ref 12.3–15.4)
EXPIRATION DATE: NORMAL
GLOBULIN UR ELPH-MCNC: 3.2 GM/DL
GLUCOSE SERPL-MCNC: 118 MG/DL (ref 65–99)
GLUCOSE UR STRIP-MCNC: NEGATIVE MG/DL
HCT VFR BLD AUTO: 37.9 % (ref 34–46.6)
HGB BLD-MCNC: 12.6 G/DL (ref 12–15.9)
IMM GRANULOCYTES # BLD AUTO: 0.1 10*3/MM3 (ref 0–0.05)
IMM GRANULOCYTES NFR BLD AUTO: 0.9 % (ref 0–0.5)
KETONES UR QL: NEGATIVE
LEUKOCYTE EST, POC: NEGATIVE
LYMPHOCYTES # BLD AUTO: 1.09 10*3/MM3 (ref 0.7–3.1)
LYMPHOCYTES NFR BLD AUTO: 9.9 % (ref 19.6–45.3)
Lab: NORMAL
MCH RBC QN AUTO: 28.4 PG (ref 26.6–33)
MCHC RBC AUTO-ENTMCNC: 33.2 G/DL (ref 31.5–35.7)
MCV RBC AUTO: 85.6 FL (ref 79–97)
MONOCYTES # BLD AUTO: 1.14 10*3/MM3 (ref 0.1–0.9)
MONOCYTES NFR BLD AUTO: 10.4 % (ref 5–12)
NEUTROPHILS NFR BLD AUTO: 76.8 % (ref 42.7–76)
NEUTROPHILS NFR BLD AUTO: 8.43 10*3/MM3 (ref 1.7–7)
NITRITE UR-MCNC: NEGATIVE MG/ML
NRBC BLD AUTO-RTO: 0 /100 WBC (ref 0–0.2)
PH UR: 7 [PH] (ref 5–8)
PLATELET # BLD AUTO: 336 10*3/MM3 (ref 140–450)
PMV BLD AUTO: 9 FL (ref 6–12)
POTASSIUM SERPL-SCNC: 3.8 MMOL/L (ref 3.5–5.2)
PROT SERPL-MCNC: 7.5 G/DL (ref 6–8.5)
PROT UR STRIP-MCNC: NEGATIVE MG/DL
RBC # BLD AUTO: 4.43 10*6/MM3 (ref 3.77–5.28)
RBC # UR STRIP: NEGATIVE /UL
SODIUM SERPL-SCNC: 131 MMOL/L (ref 136–145)
SP GR UR: 1.01 (ref 1–1.03)
UROBILINOGEN UR QL: NORMAL
WBC NRBC COR # BLD AUTO: 10.98 10*3/MM3 (ref 3.4–10.8)

## 2025-04-28 PROCEDURE — 80053 COMPREHEN METABOLIC PANEL: CPT | Performed by: INTERNAL MEDICINE

## 2025-04-28 PROCEDURE — 74177 CT ABD & PELVIS W/CONTRAST: CPT

## 2025-04-28 PROCEDURE — 85025 COMPLETE CBC W/AUTO DIFF WBC: CPT | Performed by: INTERNAL MEDICINE

## 2025-04-28 PROCEDURE — 25510000001 IOPAMIDOL 61 % SOLUTION: Performed by: INTERNAL MEDICINE

## 2025-04-28 RX ORDER — IOPAMIDOL 612 MG/ML
100 INJECTION, SOLUTION INTRAVASCULAR
Status: COMPLETED | OUTPATIENT
Start: 2025-04-28 | End: 2025-04-28

## 2025-04-28 RX ADMIN — IOPAMIDOL 90 ML: 612 INJECTION, SOLUTION INTRAVENOUS at 16:08

## 2025-04-28 NOTE — PROGRESS NOTES
Chief Complaint   Patient presents with    Back Pain       History of Present Illness    The patient presents for an acute visit for right flank pain. There is a two day history of abdominal pain. The pain is constant. The pain is in the right flank and it radiates to the back and the pelvis. The pain is characterized as pressure, sharp and stabbing. The patient has not had a fever. The patient reports that motion aggravates the pain.  The patient has noted no alleviating factors. The patient also denies hematuria, dysuria, nausea, vomiting, diarrhea, constipation, a rash, rectal bleeding, urinary hesitancy or urinary frequency.    Medications      Current Outpatient Medications:     amLODIPine (NORVASC) 5 MG tablet, TAKE 1 TABLET BY MOUTH DAILY, Disp: 90 tablet, Rfl: 1    cetirizine (zyrTEC) 10 MG tablet, Take 1 tablet by mouth Daily., Disp: , Rfl:     ipratropium (ATROVENT) 0.03 % nasal spray, , Disp: , Rfl:     losartan (Cozaar) 25 MG tablet, Take 1 tablet by mouth Daily., Disp: 30 tablet, Rfl: 2    metFORMIN (GLUCOPHAGE) 500 MG tablet, Take 1 tablet by mouth 2 (Two) Times a Day With Meals., Disp: 30 tablet, Rfl: 4    metoprolol tartrate (LOPRESSOR) 25 MG tablet, TAKE 1 TABLET BY MOUTH 2 TIMES A DAY, Disp: 180 tablet, Rfl: 1    multivitamin with minerals tablet tablet, Take 1 tablet by mouth Daily., Disp: , Rfl:     pantoprazole (PROTONIX) 40 MG EC tablet, TAKE 1 TABLET BY MOUTH TWICE A DAY FOR 30 DAYS THEN RESUME TO TAKE 1 TABLET BY MOUTH DAILY, Disp: 120 tablet, Rfl: 0    sertraline (ZOLOFT) 25 MG tablet, TAKE 1 TABLET BY MOUTH DAILY, Disp: 90 tablet, Rfl: 1    Triamcinolone Acetonide (NASACORT) 55 MCG/ACT nasal inhaler, Administer 2 sprays into the nostril(s) as directed by provider Daily As Needed., Disp: , Rfl:     triamterene-hydrochlorothiazide (DYAZIDE) 37.5-25 MG per capsule, TAKE 1 CAPSULE BY MOUTH EVERY MORNING, Disp: 90 capsule, Rfl: 1     Allergies    No Known Allergies    Problem List    Patient  Active Problem List   Diagnosis    Essential hypertension    Benign paroxysmal positional vertigo    Gastroesophageal reflux disease with esophagitis without hemorrhage    Chronic allergic rhinitis    Osteopenia of multiple sites    Trigger middle finger of right hand    Bilateral carpal tunnel syndrome    Arthritis of carpometacarpal (CMC) joint of thumb       Medications, Allergies, Problems List and Past History were reviewed and updated.    Physical Examination    /66 (BP Location: Right arm, Patient Position: Sitting, Cuff Size: Adult)   Pulse 59   Temp 97.8 °F (36.6 °C) (Temporal)   Resp 18   Wt 66.1 kg (145 lb 12.8 oz)   LMP  (LMP Unknown) Comment: LAST PAP 5/2020 BY DR DUARTE  SpO2 97%   BMI 28.47 kg/m²       HEENT: Head- Normocephalic Atraumatic. Facies- Within normal limits. Pinnas- Normal texture and shape bilaterally. Canals- Normal bilaterally. TMs- Normal bilaterally. Nares- Patent bilaterally. Nasal Septum- is normal. There is no tenderness to palpation over the frontal or maxillary sinuses. Lids- Normal bilaterally. Conjunctiva- Clear bilaterally. Sclera- Anicteric bilaterally. Oropharynx- Moist with no lesions. Tonsils- No enlargement, erythema or exudate.    Lungs: Auscultation- Clear to auscultation bilaterally. There are no retractions, clubbing or cyanosis. The Expiratory to Inspiratory ratio is equal.    Cardiovascular: There are no carotid bruits. Heart- Normal Rate with Regular rhythm and no murmurs. There are no gallops. There are no rubs. In the lower extremities there is no edema. The upper extremities do not have edema.    Abdomen: Noted to have tenderness but is not noted to have rigidity, a mass, a hernia, an enlarged liver, an enlarged spleen, an enlarged right kidney, an enlarged left kidney, pulsatile mass or scarring. The tenderness is located in the right upper quadrant, right pelvis and right flank. The tenderness is associated with guarding but not rebound. The  patient has a negative Pro's Sign. There is CVA tenderness noted on the right side but not on the left. There are no abdominal bruits.    Back: The patient has a normal spinal curvature with no evidence of scoliosis. There are no skin lesions noted on the back. Palpation reveals tenderness over the lumbar paraspinal muscles and normal muscle tone. The straight leg raising test is negative. The back has normal flexion, extension, rotation, and lateral bending.    Impression and Assessment    Right Flank Pain    Plan    Right Flank Pain Plan: Further plans will be made after the tests are reviewed.    Diagnoses and all orders for this visit:    1. Right flank pain (Primary)  -     CBC & Differential; Future  -     Comprehensive Metabolic Panel; Future  -     POC Urinalysis Dipstick, Automated; Future  -     CT Abdomen Pelvis With Contrast; Future  -     POC Urinalysis Dipstick, Automated  -     CBC & Differential  -     Comprehensive Metabolic Panel            Return to Office    The patient was instructed to return for follow-up at the next scheduled visit. The patient was instructed to return sooner if the condition changes, worsens, or does not resolve.

## 2025-05-09 ENCOUNTER — TELEPHONE (OUTPATIENT)
Dept: INTERNAL MEDICINE | Facility: CLINIC | Age: 70
End: 2025-05-09
Payer: MEDICARE

## 2025-05-09 DIAGNOSIS — R42 DIZZINESS: Primary | ICD-10-CM

## 2025-05-09 NOTE — TELEPHONE ENCOUNTER
Caller: Helga Cardenas    Relationship: Self    Best call back number:[phone]     What is the medical concern/diagnosis: VERTIGO AND FALLEN SOME     What specialty or service is being requested: ENT DR     What is the provider, practice or medical service name: [facility]     What is the office location:     What is the office phone number: [phone]  PHONE [phone]    Any additional details: SPOKE WITH PCP ABOUT THIS BEFORE AND IS READY TO SEE A SPECIALIST NOW PLEASE GET REFERRAL IN OR CALL TO DISCUSS

## 2025-05-16 ENCOUNTER — OFFICE VISIT (OUTPATIENT)
Dept: INTERNAL MEDICINE | Facility: CLINIC | Age: 70
End: 2025-05-16
Payer: MEDICARE

## 2025-05-16 VITALS
BODY MASS INDEX: 27.56 KG/M2 | DIASTOLIC BLOOD PRESSURE: 74 MMHG | SYSTOLIC BLOOD PRESSURE: 114 MMHG | TEMPERATURE: 97 F | HEART RATE: 68 BPM | WEIGHT: 141.13 LBS | RESPIRATION RATE: 16 BRPM

## 2025-05-16 DIAGNOSIS — R11.2 NAUSEA AND VOMITING, UNSPECIFIED VOMITING TYPE: ICD-10-CM

## 2025-05-16 DIAGNOSIS — K52.9 ACUTE GASTROENTERITIS: Primary | ICD-10-CM

## 2025-05-16 LAB
EXPIRATION DATE: ABNORMAL
FLUAV AG UPPER RESP QL IA.RAPID: NOT DETECTED
FLUBV AG UPPER RESP QL IA.RAPID: NOT DETECTED
INTERNAL CONTROL: ABNORMAL
Lab: ABNORMAL
SARS-COV-2 AG UPPER RESP QL IA.RAPID: NOT DETECTED

## 2025-05-16 PROCEDURE — 87428 SARSCOV & INF VIR A&B AG IA: CPT | Performed by: INTERNAL MEDICINE

## 2025-05-16 PROCEDURE — 3051F HG A1C>EQUAL 7.0%<8.0%: CPT | Performed by: INTERNAL MEDICINE

## 2025-05-16 PROCEDURE — 1159F MED LIST DOCD IN RCRD: CPT | Performed by: INTERNAL MEDICINE

## 2025-05-16 PROCEDURE — 1160F RVW MEDS BY RX/DR IN RCRD: CPT | Performed by: INTERNAL MEDICINE

## 2025-05-16 PROCEDURE — 1125F AMNT PAIN NOTED PAIN PRSNT: CPT | Performed by: INTERNAL MEDICINE

## 2025-05-16 PROCEDURE — 3078F DIAST BP <80 MM HG: CPT | Performed by: INTERNAL MEDICINE

## 2025-05-16 PROCEDURE — 99213 OFFICE O/P EST LOW 20 MIN: CPT | Performed by: INTERNAL MEDICINE

## 2025-05-16 PROCEDURE — 3074F SYST BP LT 130 MM HG: CPT | Performed by: INTERNAL MEDICINE

## 2025-05-16 RX ORDER — ONDANSETRON 8 MG/1
8 TABLET, ORALLY DISINTEGRATING ORAL EVERY 8 HOURS PRN
Qty: 30 TABLET | Refills: 0 | Status: SHIPPED | OUTPATIENT
Start: 2025-05-16

## 2025-05-16 NOTE — PATIENT INSTRUCTIONS
I recommend clear liquids x 24- 48 hours and advance to bland diet as tolerated.  May take Imodium as needed for diarrhea.  Please call with any signs or symptoms of dehydration, which we discussed today.

## 2025-05-16 NOTE — PROGRESS NOTES
Subjective       Helga Cardenas is a 69 y.o. female.     Chief Complaint   Patient presents with    Vomiting     recurring    Diarrhea     Recurring     Dizziness       History obtained from the patient.      History of Present Illness   History of Present Illness  The patient presents for evaluation of vomiting and diarrhea.    She has been experiencing persistent vomiting and diarrhea since Tuesday, with the vomitus being watery in nature. Initially, she experienced 15 to 20 episodes of vomiting daily, but this has decreased to a single episode this morning. Yesterday, she had a severe episode of vomiting accompanied by abdominal pain, describing a sensation of a large gas bubble in her abdomen that migrates to the left side and down to her waist. She reports no recent exposure to sick individuals, unusual food or drink intake, or new medications. Hydration has been maintained with water, and food intake has been minimal, including crackers, toast, and chicken noodle soup. She reports no fever but has not measured her temperature. There is no rash, congestion, runny nose, cough, chest pain, shortness of breath, sore throat, or earache. No urinary symptoms such as dysuria, frequency, or hematuria are reported. She denies hematochezia or melena. She has been experiencing chills and mild joint aches.     Diarrhea was initially concurrent with vomiting episodes, with 6 to 8 episodes yesterday and fewer today.    She has a known history of anxiety, which remains unchanged.    Headaches due to caffeine withdrawal have been reported, along with lightheadedness and dizziness.    She has a history of GERD and diverticulosis, as noted on colonoscopy. She has undergone cholecystectomy but retains her uterus and ovaries.    PAST SURGICAL HISTORY:  Cholecystectomy    Results  Diagnostic Testing   - Influenza swab: Negative   - COVID-19 swab: Negative    The following portions of the patient's history were reviewed  and updated as appropriate: allergies, current medications, past family history, past medical history, past social history, past surgical history, and problem list.      Review of Systems   Constitutional:  Positive for appetite change and chills. Negative for fever.   HENT:  Negative for congestion, ear pain, rhinorrhea and sore throat.    Respiratory:  Negative for cough and shortness of breath.    Cardiovascular:  Negative for chest pain.   Gastrointestinal:  Positive for abdominal pain, diarrhea, nausea and vomiting. Negative for blood in stool.   Genitourinary:  Negative for dysuria, frequency, hematuria and urgency.   Musculoskeletal:  Positive for arthralgias. Negative for joint swelling and myalgias.   Skin:  Negative for rash.   Neurological:  Positive for dizziness, light-headedness and headaches.   Psychiatric/Behavioral:  The patient is nervous/anxious.            Objective     Blood pressure 114/74, pulse 68, temperature 97 °F (36.1 °C), temperature source Temporal, resp. rate 16, weight 64 kg (141 lb 2 oz), not currently breastfeeding.    Physical Exam  Vitals and nursing note reviewed.   Constitutional:       Appearance: She is well-developed.      Comments: BMI greater than 25   HENT:      Mouth/Throat:      Mouth: Mucous membranes are moist.      Pharynx: No posterior oropharyngeal erythema.      Comments: Tonsils absent  Cardiovascular:      Rate and Rhythm: Normal rate and regular rhythm.      Heart sounds: Normal heart sounds. No murmur heard.  Pulmonary:      Effort: Pulmonary effort is normal.      Breath sounds: Normal breath sounds.   Abdominal:      General: Bowel sounds are increased. There is no distension.      Palpations: Abdomen is soft. There is no hepatomegaly, splenomegaly or mass.      Tenderness: There is abdominal tenderness (mild LUQ). There is no right CVA tenderness, left CVA tenderness, guarding or rebound.   Skin:     Findings: No rash.   Neurological:      Mental Status: She  is alert.   Psychiatric:         Mood and Affect: Mood normal.         Results for orders placed or performed in visit on 05/16/25   POCT SARS-CoV-2 + Flu Antigen JEREMIAH    Collection Time: 05/16/25  1:43 PM    Specimen: Swab   Result Value Ref Range    SARS Antigen Not Detected Not Detected, Presumptive Negative    Influenza A Antigen JEREMIAH Not Detected (A) Not Detected    Influenza B Antigen JEREMIAH Not Detected Not Detected    Internal Control Passed Passed    Lot Number [id number]     Expiration Date 02/07/2026        Assessment & Plan   Diagnoses and all orders for this visit:    1. Acute gastroenteritis (Primary)   May take Imodium as needed for diarrhea and Zofran as needed for nausea.   I recommended clear liquids x 24- 48 hours and advance to bland diet as tolerated.    The patient agrees to call with any signs or symptoms of dehydration (including dry mouth, doughy skin, decreased capillary refill, decreased urination, lethargy, and confusion )which we discussed today.    2. Nausea and vomiting, unspecified vomiting type  -     ondansetron ODT (ZOFRAN-ODT) 8 MG disintegrating tablet; Place 1 tablet on the tongue Every 8 (Eight) Hours As Needed for Nausea or Vomiting.  Dispense: 30 tablet; Refill: 0  -     POCT SARS-CoV-2 + Flu Antigen JEREMIAH       Return if symptoms worsen or fail to improve.             Patient or patient representative verbalized consent for the use of Ambient Listening during the visit with  Rozina Hercules MD for chart documentation. 5/16/2025  14:26 EDT

## 2025-05-19 ENCOUNTER — APPOINTMENT (OUTPATIENT)
Dept: CT IMAGING | Facility: HOSPITAL | Age: 70
End: 2025-05-19
Payer: MEDICARE

## 2025-05-19 ENCOUNTER — HOSPITAL ENCOUNTER (EMERGENCY)
Facility: HOSPITAL | Age: 70
Discharge: HOME | End: 2025-05-19
Attending: EMERGENCY MEDICINE | Admitting: EMERGENCY MEDICINE
Payer: MEDICARE

## 2025-05-19 ENCOUNTER — TELEPHONE (OUTPATIENT)
Dept: INTERNAL MEDICINE | Facility: CLINIC | Age: 70
End: 2025-05-19
Payer: MEDICARE

## 2025-05-19 VITALS
OXYGEN SATURATION: 100 % | TEMPERATURE: 98.9 F | DIASTOLIC BLOOD PRESSURE: 69 MMHG | SYSTOLIC BLOOD PRESSURE: 129 MMHG | WEIGHT: 141 LBS | HEIGHT: 60 IN | BODY MASS INDEX: 27.68 KG/M2 | RESPIRATION RATE: 18 BRPM | HEART RATE: 69 BPM

## 2025-05-19 DIAGNOSIS — R11.2 NAUSEA AND VOMITING, UNSPECIFIED VOMITING TYPE: ICD-10-CM

## 2025-05-19 DIAGNOSIS — K76.9 LESION OF LIVER: ICD-10-CM

## 2025-05-19 DIAGNOSIS — R74.01 TRANSAMINITIS: ICD-10-CM

## 2025-05-19 DIAGNOSIS — N39.0 UTI (URINARY TRACT INFECTION) WITH PYURIA: Primary | ICD-10-CM

## 2025-05-19 LAB
ALBUMIN SERPL-MCNC: 3.6 G/DL (ref 3.5–5.2)
ALBUMIN/GLOB SERPL: 1.1 G/DL
ALP SERPL-CCNC: 323 U/L (ref 39–117)
ALT SERPL W P-5'-P-CCNC: 236 U/L (ref 1–33)
ANION GAP SERPL CALCULATED.3IONS-SCNC: 11 MMOL/L (ref 5–15)
AST SERPL-CCNC: 48 U/L (ref 1–32)
BACTERIA UR QL AUTO: ABNORMAL /HPF
BASOPHILS # BLD AUTO: 0.03 10*3/MM3 (ref 0–0.2)
BASOPHILS NFR BLD AUTO: 0.3 % (ref 0–1.5)
BILIRUB SERPL-MCNC: 0.8 MG/DL (ref 0–1.2)
BILIRUB UR QL STRIP: NEGATIVE
BUN SERPL-MCNC: 11 MG/DL (ref 8–23)
BUN/CREAT SERPL: 15.5 (ref 7–25)
CALCIUM SPEC-SCNC: 9 MG/DL (ref 8.6–10.5)
CHLORIDE SERPL-SCNC: 88 MMOL/L (ref 98–107)
CLARITY UR: CLEAR
CO2 SERPL-SCNC: 31 MMOL/L (ref 22–29)
COLOR UR: YELLOW
CREAT SERPL-MCNC: 0.71 MG/DL (ref 0.57–1)
D-LACTATE SERPL-SCNC: 1 MMOL/L (ref 0.5–2)
DEPRECATED RDW RBC AUTO: 42.5 FL (ref 37–54)
EGFRCR SERPLBLD CKD-EPI 2021: 92.7 ML/MIN/1.73
EOSINOPHIL # BLD AUTO: 0.09 10*3/MM3 (ref 0–0.4)
EOSINOPHIL NFR BLD AUTO: 1 % (ref 0.3–6.2)
ERYTHROCYTE [DISTWIDTH] IN BLOOD BY AUTOMATED COUNT: 13.8 % (ref 12.3–15.4)
GLOBULIN UR ELPH-MCNC: 3.3 GM/DL
GLUCOSE SERPL-MCNC: 191 MG/DL (ref 65–99)
GLUCOSE UR STRIP-MCNC: NEGATIVE MG/DL
HAV IGM SERPL QL IA: NORMAL
HBV CORE IGM SERPL QL IA: NORMAL
HBV SURFACE AG SERPL QL IA: NORMAL
HCT VFR BLD AUTO: 31.9 % (ref 34–46.6)
HCV AB SER QL: NORMAL
HGB BLD-MCNC: 10.7 G/DL (ref 12–15.9)
HGB UR QL STRIP.AUTO: NEGATIVE
HOLD SPECIMEN: NORMAL
HYALINE CASTS UR QL AUTO: ABNORMAL /LPF
IMM GRANULOCYTES # BLD AUTO: 0.14 10*3/MM3 (ref 0–0.05)
IMM GRANULOCYTES NFR BLD AUTO: 1.6 % (ref 0–0.5)
KETONES UR QL STRIP: ABNORMAL
LEUKOCYTE ESTERASE UR QL STRIP.AUTO: ABNORMAL
LIPASE SERPL-CCNC: 81 U/L (ref 13–60)
LYMPHOCYTES # BLD AUTO: 0.54 10*3/MM3 (ref 0.7–3.1)
LYMPHOCYTES NFR BLD AUTO: 6.2 % (ref 19.6–45.3)
MAGNESIUM SERPL-MCNC: 2.1 MG/DL (ref 1.6–2.4)
MCH RBC QN AUTO: 28.1 PG (ref 26.6–33)
MCHC RBC AUTO-ENTMCNC: 33.5 G/DL (ref 31.5–35.7)
MCV RBC AUTO: 83.7 FL (ref 79–97)
MONOCYTES # BLD AUTO: 1.13 10*3/MM3 (ref 0.1–0.9)
MONOCYTES NFR BLD AUTO: 13 % (ref 5–12)
NEUTROPHILS NFR BLD AUTO: 6.75 10*3/MM3 (ref 1.7–7)
NEUTROPHILS NFR BLD AUTO: 77.9 % (ref 42.7–76)
NITRITE UR QL STRIP: NEGATIVE
NRBC BLD AUTO-RTO: 0 /100 WBC (ref 0–0.2)
PH UR STRIP.AUTO: 6.5 [PH] (ref 5–8)
PLATELET # BLD AUTO: 229 10*3/MM3 (ref 140–450)
PMV BLD AUTO: 8.8 FL (ref 6–12)
POTASSIUM SERPL-SCNC: 3.4 MMOL/L (ref 3.5–5.2)
PROT SERPL-MCNC: 6.9 G/DL (ref 6–8.5)
PROT UR QL STRIP: ABNORMAL
RBC # BLD AUTO: 3.81 10*6/MM3 (ref 3.77–5.28)
RBC # UR STRIP: ABNORMAL /HPF
REF LAB TEST METHOD: ABNORMAL
SODIUM SERPL-SCNC: 130 MMOL/L (ref 136–145)
SP GR UR STRIP: 1.01 (ref 1–1.03)
SQUAMOUS #/AREA URNS HPF: ABNORMAL /HPF
UROBILINOGEN UR QL STRIP: ABNORMAL
WBC # UR STRIP: ABNORMAL /HPF
WBC NRBC COR # BLD AUTO: 8.68 10*3/MM3 (ref 3.4–10.8)
WHOLE BLOOD HOLD COAG: NORMAL
WHOLE BLOOD HOLD SPECIMEN: NORMAL

## 2025-05-19 PROCEDURE — 87186 SC STD MICRODIL/AGAR DIL: CPT

## 2025-05-19 PROCEDURE — 96374 THER/PROPH/DIAG INJ IV PUSH: CPT

## 2025-05-19 PROCEDURE — 87077 CULTURE AEROBIC IDENTIFY: CPT

## 2025-05-19 PROCEDURE — 81001 URINALYSIS AUTO W/SCOPE: CPT | Performed by: EMERGENCY MEDICINE

## 2025-05-19 PROCEDURE — 80074 ACUTE HEPATITIS PANEL: CPT

## 2025-05-19 PROCEDURE — 83605 ASSAY OF LACTIC ACID: CPT | Performed by: EMERGENCY MEDICINE

## 2025-05-19 PROCEDURE — 25010000002 DROPERIDOL PER 5 MG

## 2025-05-19 PROCEDURE — 80053 COMPREHEN METABOLIC PANEL: CPT | Performed by: EMERGENCY MEDICINE

## 2025-05-19 PROCEDURE — 25510000001 IOPAMIDOL 61 % SOLUTION: Performed by: EMERGENCY MEDICINE

## 2025-05-19 PROCEDURE — 83735 ASSAY OF MAGNESIUM: CPT

## 2025-05-19 PROCEDURE — 74177 CT ABD & PELVIS W/CONTRAST: CPT

## 2025-05-19 PROCEDURE — 85025 COMPLETE CBC W/AUTO DIFF WBC: CPT | Performed by: EMERGENCY MEDICINE

## 2025-05-19 PROCEDURE — 36415 COLL VENOUS BLD VENIPUNCTURE: CPT

## 2025-05-19 PROCEDURE — 25810000003 SODIUM CHLORIDE 0.9 % SOLUTION

## 2025-05-19 PROCEDURE — 83690 ASSAY OF LIPASE: CPT | Performed by: EMERGENCY MEDICINE

## 2025-05-19 PROCEDURE — 87086 URINE CULTURE/COLONY COUNT: CPT

## 2025-05-19 PROCEDURE — 99285 EMERGENCY DEPT VISIT HI MDM: CPT

## 2025-05-19 RX ORDER — IOPAMIDOL 612 MG/ML
100 INJECTION, SOLUTION INTRAVASCULAR
Status: COMPLETED | OUTPATIENT
Start: 2025-05-19 | End: 2025-05-19

## 2025-05-19 RX ORDER — ALUMINA, MAGNESIA, AND SIMETHICONE 2400; 2400; 240 MG/30ML; MG/30ML; MG/30ML
30 SUSPENSION ORAL ONCE
Status: COMPLETED | OUTPATIENT
Start: 2025-05-19 | End: 2025-05-19

## 2025-05-19 RX ORDER — DIPHENHYDRAMINE, LIDOCAINE, NYSTATIN
5 KIT ORAL 4 TIMES DAILY
Qty: 60 ML | Refills: 0 | Status: SHIPPED | OUTPATIENT
Start: 2025-05-19 | End: 2025-05-20

## 2025-05-19 RX ORDER — POTASSIUM CHLORIDE 1.5 G/1.58G
40 POWDER, FOR SOLUTION ORAL ONCE
Status: COMPLETED | OUTPATIENT
Start: 2025-05-19 | End: 2025-05-19

## 2025-05-19 RX ORDER — MECLIZINE HYDROCHLORIDE 25 MG/1
25 TABLET ORAL 4 TIMES DAILY PRN
Qty: 20 TABLET | Refills: 0 | Status: SHIPPED | OUTPATIENT
Start: 2025-05-19

## 2025-05-19 RX ORDER — CEFUROXIME AXETIL 500 MG/1
500 TABLET ORAL 2 TIMES DAILY
Qty: 10 TABLET | Refills: 0 | Status: SHIPPED | OUTPATIENT
Start: 2025-05-19 | End: 2025-05-24

## 2025-05-19 RX ORDER — SODIUM CHLORIDE 9 MG/ML
10 INJECTION, SOLUTION INTRAMUSCULAR; INTRAVENOUS; SUBCUTANEOUS AS NEEDED
Status: DISCONTINUED | OUTPATIENT
Start: 2025-05-19 | End: 2025-05-19 | Stop reason: HOSPADM

## 2025-05-19 RX ORDER — DROPERIDOL 2.5 MG/ML
2.5 INJECTION, SOLUTION INTRAMUSCULAR; INTRAVENOUS ONCE
Status: COMPLETED | OUTPATIENT
Start: 2025-05-19 | End: 2025-05-19

## 2025-05-19 RX ORDER — SODIUM CHLORIDE 0.9 % (FLUSH) 0.9 %
10 SYRINGE (ML) INJECTION AS NEEDED
Status: DISCONTINUED | OUTPATIENT
Start: 2025-05-19 | End: 2025-05-19 | Stop reason: HOSPADM

## 2025-05-19 RX ADMIN — POTASSIUM CHLORIDE 40 MEQ: 1.5 FOR SOLUTION ORAL at 12:56

## 2025-05-19 RX ADMIN — DROPERIDOL 2.5 MG: 2.5 INJECTION, SOLUTION INTRAMUSCULAR; INTRAVENOUS at 12:16

## 2025-05-19 RX ADMIN — SODIUM CHLORIDE 1000 ML: 9 INJECTION, SOLUTION INTRAVENOUS at 12:56

## 2025-05-19 RX ADMIN — ALUMINUM HYDROXIDE, MAGNESIUM HYDROXIDE, AND DIMETHICONE 30 ML: 400; 400; 40 SUSPENSION ORAL at 12:56

## 2025-05-19 RX ADMIN — SODIUM CHLORIDE 1000 ML: 9 INJECTION, SOLUTION INTRAVENOUS at 11:42

## 2025-05-19 RX ADMIN — IOPAMIDOL 95 ML: 612 INJECTION, SOLUTION INTRAVENOUS at 13:42

## 2025-05-19 NOTE — TELEPHONE ENCOUNTER
Caller: Helga Cardenas    Relationship: Self    Best call back number: [phone]     What is the best time to reach you: ANY    Who are you requesting to speak with (clinical staff, provider,  specific staff member): PAIGE,     Do you know the name of the person who called: PT    What was the call regarding: PT STATED SHE WOULD LIKE A CALLBACK FROM PAIGE.

## 2025-05-19 NOTE — TELEPHONE ENCOUNTER
I spoke to patient and then spoke to Dr. Barba and he wanted me to send patient to ER. So I  told patient and she was going to go to ER

## 2025-05-19 NOTE — DISCHARGE INSTRUCTIONS
Call your gastroenterologist, Dr. Mendoza, to request a follow-up liver MRI based on today's CT imaging study.    Take your antibiotic twice daily until finished.  Use the meclizine as needed for vertigo symptoms.  Follow-up with your primary care provider to have recheck labs.

## 2025-05-19 NOTE — ED PROVIDER NOTES
Subjective   History of Present Illness  Patient is a 69-year-old female who presents as referred by her primary care after 6 days of nausea, vomiting, diarrhea, with epigastric and right upper quadrant pain today.  Patient reports that medications prescribed by her PCP have helped to reduce her vomiting and diarrhea but she continues to feel weak and dehydrated, in addition to the abdominal pain complaints.  Patient had a cholecystectomy 3 years prior.  She denies any current urinary symptoms apart from more concentrated color.  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    Review of Systems   Constitutional: Negative.    Respiratory: Negative.     Cardiovascular: Negative.    Gastrointestinal:  Positive for abdominal pain, diarrhea, nausea and vomiting.   Genitourinary: Negative.    Musculoskeletal: Negative.    Neurological: Negative.        Past Medical History:   Diagnosis Date    Acute cholecystitis 3/12/2022    GERD (gastroesophageal reflux disease)     Hypertension        No Known Allergies    Past Surgical History:   Procedure Laterality Date    CHOLECYSTECTOMY  3/ 12/22    CHOLECYSTECTOMY WITH INTRAOPERATIVE CHOLANGIOGRAM N/A 03/13/2022    Procedure: CHOLECYSTECTOMY LAPAROSCOPIC;  Surgeon: Harshad Hdz MD;  Location:  [facility];  Service: General;  Laterality: N/A;    COLONOSCOPY      2011    ERCP N/A 03/12/2022    Procedure: ENDOSCOPIC RETROGRADE CHOLANGIOPANCREATOGRAPHY;  Surgeon: Brunner, Mark I, MD;  Location:  [facility];  Service: Gastroenterology;  Laterality: N/A;  with sphincterotomy and balloon sweep with 9mm-12mm balloon      HYSTEROSCOPY ENDOMETRIAL ABLATION      TONSILLECTOMY      As a child       Family History   Problem Relation Age of Onset    Diabetes type II Mother     Hypertension Father     Breast cancer Neg Hx      Ovarian cancer Neg Hx        Social History     Socioeconomic History    Marital status:    Tobacco Use    Smoking status: Never     Passive exposure: Never    Smokeless tobacco: Never   Vaping Use    Vaping status: Never Used   Substance and Sexual Activity    Alcohol use: Yes     Alcohol/week: 5.0 standard drinks of alcohol     Types: 5 Glasses of wine per week     Comment: 1 glass wine/day    Drug use: No    Sexual activity: Yes     Partners: Male     Birth control/protection: Post-menopausal           Objective   Physical Exam  Constitutional:       Appearance: Normal appearance.   HENT:      Head: Normocephalic and atraumatic.      Right Ear: External ear normal.      Left Ear: External ear normal.   Eyes:      Extraocular Movements: Extraocular movements intact.      Pupils: Pupils are equal, round, and reactive to light.   Cardiovascular:      Rate and Rhythm: Normal rate.      Pulses: Normal pulses.   Pulmonary:      Effort: Pulmonary effort is normal.   Abdominal:      General: Abdomen is flat. Bowel sounds are normal.      Palpations: Abdomen is soft.      Tenderness: There is abdominal tenderness in the right upper quadrant and epigastric area. There is no right CVA tenderness or left CVA tenderness. Positive signs include Pro's sign.   Musculoskeletal:         General: Normal range of motion.      Cervical back: Normal range of motion.   Skin:     General: Skin is warm and dry.      Capillary Refill: Capillary refill takes less than 2 seconds.   Neurological:      General: No focal deficit present.      Mental Status: She is alert and oriented to person, place, and time.         Procedures           ED Course  ED Course as of 05/19/25 1514   Mon May 19, 2025   1145 Evaluation the patient ED split flow room 25, accompanied by her . [JH]   1202 Serum chemistry resulted, there is some hyperglycemia, mild hyponatremia, mild hypokalemia, transaminitis.  Lipase is mildly elevated as well.  The  urinalysis is notable for TNTC pyuria and bacteriuria.  CBC has anemia that is nonactionable. []   1403 Reevaluated the patient.  She reports much less nausea, abdominal pain resolved as well.  We are awaiting CT imaging results.  She requested her primary care Rey Desouza be notified of workup results. [JH]      ED Course User Index  [JH] Scottie Rodrigues APRN                                                       Medical Decision Making  Given patient's complaints, their chronicity, the previous evaluations including CT imaging, and lab work, as well as my exam today, differential includes gastroenteritis, enterocolitis, urinary tract infection, pancreatitis, choledocholithiasis, electrolyte derangement and dehydration are also on the differential, and less likely is renal calculi, liver failure, but cannot exclude.  Patient will have serum screening labs, urinalysis, consideration for CT imaging, IV crystalloid resuscitation, antiemetic medication as well as symptomatic relief.  We will reevaluate for improvement, communicate all workup results and plan her disposition.  Patient is established with Darian Mendoza of [facility], and reports she additionally has appointment tomorrow with audiology and in 3 weeks with ENT for her vertiginous symptoms which have been longstanding.    Amount and/or Complexity of Data Reviewed  Labs: ordered.  Radiology: ordered.    Risk  OTC drugs.  Prescription drug management.        Final diagnoses:   UTI (urinary tract infection) with pyuria   Transaminitis   Lesion of liver   Nausea and vomiting, unspecified vomiting type       ED Disposition  ED Disposition       ED Disposition   Discharge    Condition   Stable    Comment   --               Rey Desouza MD  [address]  [facility] [address] [phone]    Go to       Darian Mendoza MD  [address]  [facility] [address] [phone]    Call            Medication List        New  Prescriptions      cefuroxime 500 MG tablet  Commonly known as: CEFTIN  Take 1 tablet by mouth 2 (Two) Times a Day for 5 days.     meclizine 25 MG tablet  Commonly known as: ANTIVERT  Take 1 tablet by mouth 4 (Four) Times a Day As Needed for Dizziness.     nystatin susp + lidocaine viscous oral suspension  Commonly known as: MAGIC MOUTHWASH  Swish and swallow 5 mL 4 (Four) Times a Day.               Where to Get Your Medications        These medications were sent to [facility] [id number] - [place], [place] - [address] - [phone]  - [phone] FX  [address] [address]      Phone: [phone]   cefuroxime 500 MG tablet  meclizine 25 MG tablet  nystatin susp + lidocaine viscous oral suspension            Scottie Rodrigues, APRN  05/19/25 2767

## 2025-05-20 RX ORDER — LIDOCAINE HYDROCHLORIDE 20 MG/ML
5 SOLUTION OROPHARYNGEAL 4 TIMES DAILY
Qty: 100 ML | Refills: 0 | Status: SHIPPED | OUTPATIENT
Start: 2025-05-20 | End: 2025-05-25

## 2025-05-21 ENCOUNTER — TELEPHONE (OUTPATIENT)
Dept: INTERNAL MEDICINE | Facility: CLINIC | Age: 70
End: 2025-05-21
Payer: MEDICARE

## 2025-05-21 LAB — BACTERIA SPEC AEROBE CULT: ABNORMAL

## 2025-05-21 NOTE — TELEPHONE ENCOUNTER
Stop the Triamterene HCTZ and only take the amlodipine once daily.      Schedule a follow-up with me in 1-2 weeks.    Rey Desouza MD  13:16 EDT  05/21/25

## 2025-05-21 NOTE — TELEPHONE ENCOUNTER
PATIENT HAS CALLED REQUESTING A CALL BACK FROM [facility] TO LET HER KNOW IF PCP RECEIVED RECORDS FROM ER VISIT AT [facility] 05/19/25.    CALL BACK NUMBER [phone]

## 2025-05-21 NOTE — TELEPHONE ENCOUNTER
Spoke with patient, states she just wanted to make sure that Dr Desouza saw and reviewed her ER visit info from 5/19.  States they did get her in with Dr Mendoza on 6/5/25.  States her B/P was very low last Saturday = 81/54 so she held some of her B/P meds and has slowly restarted most of them but was wanting Dr Desouza to advise what he recommends.  States B/P today is highest she has been at 138/70.  Last night was 117/63.  States she is taking the metoprolol and losartan and has restarted to Amlodipine at 1/2 tablet twice daily.  States she does not tolerate the Metformin and has stopped that all together and has not been taking the triamterene/HCTZ as she feels she needs her fluid right now. Explained will send info to Dr Desouza.  Verbalized great appreciation.

## 2025-05-21 NOTE — TELEPHONE ENCOUNTER
Spoke with patient, informed that Dr Desouza said stop the Triamterene HCTZ and only take the amlodipine 1/2 tablet once daily.     Schedule a follow-up with me in 1-2 weeks.    Verbalized good understanding and agreement.  Appointment scheduled for 6/5/25 at 4:00 with Dr Desouza.  States she sees Dr Mendoza that morning.

## 2025-05-21 NOTE — TELEPHONE ENCOUNTER
Do you want her to take Amlodipine 1/2 tablet once daily or 1 tablet daily?  States she felt like it wiped her out when she was taking 1 tablet daily and that is why she started taking 1/2 tablet twice daily.

## 2025-06-03 ENCOUNTER — EXTERNAL PBMM DATA (OUTPATIENT)
Dept: PHARMACY | Facility: OTHER | Age: 70
End: 2025-06-03
Payer: MEDICARE

## 2025-06-05 ENCOUNTER — OFFICE VISIT (OUTPATIENT)
Dept: INTERNAL MEDICINE | Facility: CLINIC | Age: 70
End: 2025-06-05
Payer: MEDICARE

## 2025-06-05 VITALS
DIASTOLIC BLOOD PRESSURE: 64 MMHG | HEART RATE: 64 BPM | WEIGHT: 140 LBS | RESPIRATION RATE: 16 BRPM | TEMPERATURE: 98.2 F | SYSTOLIC BLOOD PRESSURE: 136 MMHG | BODY MASS INDEX: 27.34 KG/M2

## 2025-06-05 DIAGNOSIS — E11.9 TYPE 2 DIABETES MELLITUS WITHOUT COMPLICATION, WITHOUT LONG-TERM CURRENT USE OF INSULIN: ICD-10-CM

## 2025-06-05 DIAGNOSIS — I10 ESSENTIAL HYPERTENSION: ICD-10-CM

## 2025-06-05 DIAGNOSIS — R30.0 DYSURIA: Primary | ICD-10-CM

## 2025-06-05 DIAGNOSIS — F32.9 REACTIVE DEPRESSION: ICD-10-CM

## 2025-06-05 DIAGNOSIS — R42 DIZZINESS: ICD-10-CM

## 2025-06-05 LAB
BILIRUB BLD-MCNC: NEGATIVE MG/DL
CLARITY, POC: CLEAR
COLOR UR: YELLOW
EXPIRATION DATE: NORMAL
GLUCOSE UR STRIP-MCNC: NEGATIVE MG/DL
KETONES UR QL: NEGATIVE
LEUKOCYTE EST, POC: NEGATIVE
Lab: NORMAL
NITRITE UR-MCNC: NEGATIVE MG/ML
PH UR: 6.5 [PH] (ref 5–8)
PROT UR STRIP-MCNC: NEGATIVE MG/DL
RBC # UR STRIP: NEGATIVE /UL
SP GR UR: 1.01 (ref 1–1.03)
UROBILINOGEN UR QL: NORMAL

## 2025-06-05 PROCEDURE — 81003 URINALYSIS AUTO W/O SCOPE: CPT | Performed by: INTERNAL MEDICINE

## 2025-06-05 PROCEDURE — 87086 URINE CULTURE/COLONY COUNT: CPT | Performed by: INTERNAL MEDICINE

## 2025-06-05 PROCEDURE — 99214 OFFICE O/P EST MOD 30 MIN: CPT | Performed by: INTERNAL MEDICINE

## 2025-06-05 PROCEDURE — 1125F AMNT PAIN NOTED PAIN PRSNT: CPT | Performed by: INTERNAL MEDICINE

## 2025-06-05 PROCEDURE — 3078F DIAST BP <80 MM HG: CPT | Performed by: INTERNAL MEDICINE

## 2025-06-05 PROCEDURE — 3051F HG A1C>EQUAL 7.0%<8.0%: CPT | Performed by: INTERNAL MEDICINE

## 2025-06-05 PROCEDURE — 3075F SYST BP GE 130 - 139MM HG: CPT | Performed by: INTERNAL MEDICINE

## 2025-06-05 NOTE — PROGRESS NOTES
Chief Complaint   Patient presents with    ER follow up [facility] 5/19        History of Present Illness     The patient presents as an ER follow up on 5/19 due to 6 days of nausea, vomiting, diarrhea, with epigastric and right upper quadrant pain today. The following findings: Hyperglycemia, mild hyponatremia, mild hypokalemia, transaminase and lipase are mildly elevated as well. No medication changes were made. Liver enzymes were re-drawn with her gastroenterologist today. While in the ER she also was found to have a UTI and treated with cefuroxime. At this time she presents today with continued complaints of dizziness and some nausea. She states that ENT did not find anything significant. They did suggest to go off of the sertraline.     The patient presents for a follow-up related to hypertension. The patient reports that she has had no headaches, chest pain, dyspnea, edema, syncope, blurred vision or palpitations. She states that she is taking her medication as prescribed. She is not having medication side effects. She has a record of her blood pressure, stating that prior to the ER visit she had a low reading of 81/50s.    The patient presents for a follow-up related to depression. She denies currently having depression symptoms. She denies suicidal ideation. Her energy level is good. She denies agorophobia. She sleeps well. She is not tearful. She states that her current depression symptoms are stable. She is currently taking a medication. The current medication regimen consists of sertraline. The patient denies having medication side effects.     The patient presents for a follow-up related to Type 2 Diabetes Mellitus. She denies hypoglycemic symptoms. The patient denies polyuria, polydypsia or polyphagia. She reports no symptoms of neuropathy. The patient is not on medication for her Type 2 Diabetes Mellitus. The patient does check her feet daily at home. She denies orthopnea, paroxysmal nocturnal dyspnea or  dyspnea on exertion.    Medications      Current Outpatient Medications:     amLODIPine (NORVASC) 5 MG tablet, TAKE 1 TABLET BY MOUTH DAILY (Patient taking differently: Take 0.5 tablets by mouth 2 (Two) Times a Day.), Disp: 90 tablet, Rfl: 1    cetirizine (zyrTEC) 10 MG tablet, Take 1 tablet by mouth Daily., Disp: , Rfl:     losartan (Cozaar) 25 MG tablet, Take 1 tablet by mouth Daily., Disp: 30 tablet, Rfl: 2    metoprolol tartrate (LOPRESSOR) 25 MG tablet, TAKE 1 TABLET BY MOUTH 2 TIMES A DAY, Disp: 180 tablet, Rfl: 1    multivitamin with minerals tablet tablet, Take 1 tablet by mouth Daily., Disp: , Rfl:     ondansetron ODT (ZOFRAN-ODT) 8 MG disintegrating tablet, Place 1 tablet on the tongue Every 8 (Eight) Hours As Needed for Nausea or Vomiting., Disp: 30 tablet, Rfl: 0    pantoprazole (PROTONIX) 40 MG EC tablet, TAKE 1 TABLET BY MOUTH TWICE A DAY FOR 30 DAYS THEN RESUME TO TAKE 1 TABLET BY MOUTH DAILY, Disp: 120 tablet, Rfl: 0    sertraline (ZOLOFT) 25 MG tablet, TAKE 1 TABLET BY MOUTH DAILY, Disp: 90 tablet, Rfl: 1    Triamcinolone Acetonide (NASACORT) 55 MCG/ACT nasal inhaler, Administer 2 sprays into the nostril(s) as directed by provider Daily As Needed., Disp: , Rfl:     triamterene-hydrochlorothiazide (DYAZIDE) 37.5-25 MG per capsule, TAKE 1 CAPSULE BY MOUTH EVERY MORNING, Disp: 90 capsule, Rfl: 1    metFORMIN (GLUCOPHAGE) 500 MG tablet, Take 1 tablet by mouth 2 (Two) Times a Day With Meals. (Patient not taking: Reported on 6/5/2025), Disp: 30 tablet, Rfl: 4     Allergies    No Known Allergies    Problem List    Patient Active Problem List   Diagnosis    Essential hypertension    Benign paroxysmal positional vertigo    Gastroesophageal reflux disease with esophagitis without hemorrhage    Chronic allergic rhinitis    Osteopenia of multiple sites    Trigger middle finger of right hand    Bilateral carpal tunnel syndrome    Arthritis of carpometacarpal (CMC) joint of thumb       Medications, Allergies,  Problems List and Past History were reviewed and updated.    Physical Examination    /64 (BP Location: Left arm, Patient Position: Sitting, Cuff Size: Adult)   Pulse 64   Temp 98.2 °F (36.8 °C) (Infrared)   Resp 16   Wt 63.5 kg (140 lb)   LMP  (LMP Unknown) Comment: LAST PAP 5/2020 BY DR DUARTE  BMI 27.34 kg/m²      Physical Exam  Constitutional:       General: She is not in acute distress.     Appearance: Normal appearance. She is not ill-appearing, toxic-appearing or diaphoretic.   Cardiovascular:      Rate and Rhythm: Normal rate and regular rhythm.      Pulses: Normal pulses.      Heart sounds: Normal heart sounds. No murmur heard.     No friction rub. No gallop.   Pulmonary:      Effort: Pulmonary effort is normal. No respiratory distress.      Breath sounds: Normal breath sounds. No stridor. No wheezing, rhonchi or rales.   Chest:      Chest wall: No tenderness.   Neurological:      Mental Status: She is alert.   Psychiatric:         Mood and Affect: Mood normal.         Behavior: Behavior normal.         Thought Content: Thought content normal.         Judgment: Judgment normal.         Diagnoses and all orders for this visit:    1. Dysuria (Primary)  -     Urine Culture - Urine, Urine, Clean Catch; Future  -     Urine Culture - Urine, Urine, Clean Catch  -     POC Urinalysis Dipstick, Automated    2. Dizziness    3. Type 2 diabetes mellitus without complication, without long-term current use of insulin    4. Essential hypertension    5. Reactive depression       Discussed stopping the triamterene and the sertraline for improvement of dizziness. Continue on all other prescribed medications.The patient was instructed to return for follow-up in 2 weeks. The patient was instructed to return sooner if the condition changes, worsens, or does not resolve.      Attending Note:   Patient was personally seen and examined by me.  I have obtained and corroborated the history and examination as documented  above, and agree with the accuracy of the documented information.  All orders were reviewed and personally signed by me.   Rey Desouza MD  07:34 EDT  06/09/25

## 2025-06-07 LAB — BACTERIA SPEC AEROBE CULT: NO GROWTH

## 2025-06-09 ENCOUNTER — TELEPHONE (OUTPATIENT)
Dept: INTERNAL MEDICINE | Facility: CLINIC | Age: 70
End: 2025-06-09
Payer: MEDICARE

## 2025-06-09 NOTE — TELEPHONE ENCOUNTER
Caller: RADHA    Relationship: [employer]     Best call back number:     [phone]     What was the call regarding: PATIENTS INSURANCE IS CALLING TO SEE IF metFORMIN (GLUCOPHAGE) 500 MG tablet  WAS DISCONTINUED BY THE PROVIDER OR NOT. PLEASE CALL HER BACK

## 2025-06-09 NOTE — TELEPHONE ENCOUNTER
Spoke with Bridgette Pharmacist   she states she spoke to Helga today to review her medications and she told her she the metformin had be stopped by the provider. Please advise if she is still taking ?

## 2025-06-10 NOTE — TELEPHONE ENCOUNTER
She is holding the medication at least until her next visit on 6/19.  Rey Desouza MD  20:19 EDT  06/09/25

## 2025-06-13 ENCOUNTER — TRANSCRIBE ORDERS (OUTPATIENT)
Dept: ADMINISTRATIVE | Facility: HOSPITAL | Age: 70
End: 2025-06-13
Payer: MEDICARE

## 2025-06-13 DIAGNOSIS — R74.8 ABNORMAL ALKALINE PHOSPHATASE TEST: Primary | ICD-10-CM

## 2025-06-17 ENCOUNTER — EXTERNAL PBMM DATA (OUTPATIENT)
Dept: PHARMACY | Facility: OTHER | Age: 70
End: 2025-06-17
Payer: MEDICARE

## 2025-06-17 ENCOUNTER — TELEPHONE (OUTPATIENT)
Dept: INTERNAL MEDICINE | Facility: CLINIC | Age: 70
End: 2025-06-17
Payer: MEDICARE

## 2025-06-17 NOTE — TELEPHONE ENCOUNTER
Spoke with patient, states she had her appointment with Dr Mendoza and had endoscopy and he has scheduled her for MRI with MRCP on 6/19 at 12:45pm.  States she was scheduled with Dr Desouza for follow up at 1pm same day.  Explained the MRI will take precedence and to keep that appointment.  Explained that Dr Desouza is out of the office the next couple of weeks but will update him and see when he wants her to reschedule and call her back.  Verbalized great appreciation.

## 2025-06-17 NOTE — TELEPHONE ENCOUNTER
Caller: Helga Cardenas    Relationship: Self    Best call back number: [phone]     What was the call regarding: PATIENT NEEDS A CALL BACK ABOUT NEXT APPOINTMENT, AND TO DISCUSS PAPERWORK NEEDED FOR THE MRI SHE HAS COMING UP. WOULD LIKE TO SPEAK WITH ASH    Is it okay if the provider responds through MyChart: NO

## 2025-06-27 ENCOUNTER — EXTERNAL PBMM DATA (OUTPATIENT)
Dept: PHARMACY | Facility: OTHER | Age: 70
End: 2025-06-27
Payer: MEDICARE

## 2025-07-03 DIAGNOSIS — I10 ESSENTIAL HYPERTENSION: ICD-10-CM

## 2025-07-03 RX ORDER — LOSARTAN POTASSIUM 25 MG/1
25 TABLET ORAL DAILY
Qty: 30 TABLET | Refills: 2 | Status: SHIPPED | OUTPATIENT
Start: 2025-07-03

## 2025-07-08 ENCOUNTER — OFFICE VISIT (OUTPATIENT)
Dept: INTERNAL MEDICINE | Facility: CLINIC | Age: 70
End: 2025-07-08
Payer: MEDICARE

## 2025-07-08 VITALS
HEART RATE: 64 BPM | WEIGHT: 133 LBS | TEMPERATURE: 98 F | SYSTOLIC BLOOD PRESSURE: 138 MMHG | DIASTOLIC BLOOD PRESSURE: 82 MMHG | BODY MASS INDEX: 25.97 KG/M2 | RESPIRATION RATE: 18 BRPM

## 2025-07-08 DIAGNOSIS — R79.89 ELEVATED LFTS: ICD-10-CM

## 2025-07-08 DIAGNOSIS — I10 ESSENTIAL HYPERTENSION: Primary | ICD-10-CM

## 2025-07-08 DIAGNOSIS — D64.9 ANEMIA, UNSPECIFIED TYPE: ICD-10-CM

## 2025-07-08 DIAGNOSIS — K21.9 GASTROESOPHAGEAL REFLUX DISEASE WITHOUT ESOPHAGITIS: ICD-10-CM

## 2025-07-08 DIAGNOSIS — I10 ESSENTIAL HYPERTENSION: ICD-10-CM

## 2025-07-08 DIAGNOSIS — E11.9 TYPE 2 DIABETES MELLITUS WITHOUT COMPLICATION, WITHOUT LONG-TERM CURRENT USE OF INSULIN: ICD-10-CM

## 2025-07-08 LAB
ALBUMIN SERPL-MCNC: 4 G/DL (ref 3.5–5.2)
ALBUMIN/GLOB SERPL: 1.3 G/DL
ALP SERPL-CCNC: 149 U/L (ref 39–117)
ALT SERPL W P-5'-P-CCNC: 30 U/L (ref 1–33)
ANION GAP SERPL CALCULATED.3IONS-SCNC: 8.5 MMOL/L (ref 5–15)
AST SERPL-CCNC: 22 U/L (ref 1–32)
BASOPHILS # BLD AUTO: 0.04 10*3/MM3 (ref 0–0.2)
BASOPHILS NFR BLD AUTO: 0.6 % (ref 0–1.5)
BILIRUB BLD-MCNC: ABNORMAL MG/DL
BILIRUB SERPL-MCNC: 0.3 MG/DL (ref 0–1.2)
BUN SERPL-MCNC: 11 MG/DL (ref 8–23)
BUN/CREAT SERPL: 20 (ref 7–25)
CALCIUM SPEC-SCNC: 9.6 MG/DL (ref 8.6–10.5)
CHLORIDE SERPL-SCNC: 98 MMOL/L (ref 98–107)
CLARITY, POC: CLEAR
CO2 SERPL-SCNC: 26.5 MMOL/L (ref 22–29)
COLOR UR: YELLOW
CREAT SERPL-MCNC: 0.55 MG/DL (ref 0.57–1)
DEPRECATED RDW RBC AUTO: 46.9 FL (ref 37–54)
EGFRCR SERPLBLD CKD-EPI 2021: 100 ML/MIN/1.73
EOSINOPHIL # BLD AUTO: 0.13 10*3/MM3 (ref 0–0.4)
EOSINOPHIL NFR BLD AUTO: 2 % (ref 0.3–6.2)
ERYTHROCYTE [DISTWIDTH] IN BLOOD BY AUTOMATED COUNT: 15.1 % (ref 12.3–15.4)
EXPIRATION DATE: ABNORMAL
FERRITIN SERPL-MCNC: 39.5 NG/ML (ref 13–150)
FOLATE SERPL-MCNC: >20 NG/ML (ref 4.78–24.2)
GLOBULIN UR ELPH-MCNC: 3 GM/DL
GLUCOSE SERPL-MCNC: 123 MG/DL (ref 65–99)
GLUCOSE UR STRIP-MCNC: NEGATIVE MG/DL
HBA1C MFR BLD: 6.6 % (ref 4.8–5.6)
HCT VFR BLD AUTO: 33.7 % (ref 34–46.6)
HGB BLD-MCNC: 11 G/DL (ref 12–15.9)
IMM GRANULOCYTES # BLD AUTO: 0.03 10*3/MM3 (ref 0–0.05)
IMM GRANULOCYTES NFR BLD AUTO: 0.5 % (ref 0–0.5)
IRON 24H UR-MRATE: 74 MCG/DL (ref 37–145)
IRON SATN MFR SERPL: 18 % (ref 20–50)
KETONES UR QL: NEGATIVE
LEUKOCYTE EST, POC: NEGATIVE
LYMPHOCYTES # BLD AUTO: 0.82 10*3/MM3 (ref 0.7–3.1)
LYMPHOCYTES NFR BLD AUTO: 12.7 % (ref 19.6–45.3)
Lab: ABNORMAL
MCH RBC QN AUTO: 28.8 PG (ref 26.6–33)
MCHC RBC AUTO-ENTMCNC: 32.6 G/DL (ref 31.5–35.7)
MCV RBC AUTO: 88.2 FL (ref 79–97)
MONOCYTES # BLD AUTO: 0.58 10*3/MM3 (ref 0.1–0.9)
MONOCYTES NFR BLD AUTO: 9 % (ref 5–12)
NEUTROPHILS NFR BLD AUTO: 4.87 10*3/MM3 (ref 1.7–7)
NEUTROPHILS NFR BLD AUTO: 75.2 % (ref 42.7–76)
NITRITE UR-MCNC: NEGATIVE MG/ML
NRBC BLD AUTO-RTO: 0 /100 WBC (ref 0–0.2)
PH UR: 5 [PH] (ref 5–8)
PLATELET # BLD AUTO: 282 10*3/MM3 (ref 140–450)
PMV BLD AUTO: 9.3 FL (ref 6–12)
POTASSIUM SERPL-SCNC: 3.9 MMOL/L (ref 3.5–5.2)
PROT SERPL-MCNC: 7 G/DL (ref 6–8.5)
PROT UR STRIP-MCNC: NEGATIVE MG/DL
RBC # BLD AUTO: 3.82 10*6/MM3 (ref 3.77–5.28)
RBC # UR STRIP: NEGATIVE /UL
RETICS # AUTO: 0.07 10*6/MM3 (ref 0.02–0.13)
RETICS/RBC NFR AUTO: 1.85 % (ref 0.7–1.9)
SODIUM SERPL-SCNC: 133 MMOL/L (ref 136–145)
SP GR UR: 1.01 (ref 1–1.03)
TIBC SERPL-MCNC: 401 MCG/DL (ref 298–536)
TRANSFERRIN SERPL-MCNC: 269 MG/DL (ref 200–360)
UROBILINOGEN UR QL: NORMAL
VIT B12 BLD-MCNC: 323 PG/ML (ref 211–946)
WBC NRBC COR # BLD AUTO: 6.47 10*3/MM3 (ref 3.4–10.8)

## 2025-07-08 PROCEDURE — 82607 VITAMIN B-12: CPT | Performed by: INTERNAL MEDICINE

## 2025-07-08 PROCEDURE — 99214 OFFICE O/P EST MOD 30 MIN: CPT | Performed by: INTERNAL MEDICINE

## 2025-07-08 PROCEDURE — 81003 URINALYSIS AUTO W/O SCOPE: CPT | Performed by: INTERNAL MEDICINE

## 2025-07-08 PROCEDURE — 82746 ASSAY OF FOLIC ACID SERUM: CPT | Performed by: INTERNAL MEDICINE

## 2025-07-08 PROCEDURE — 85025 COMPLETE CBC W/AUTO DIFF WBC: CPT | Performed by: INTERNAL MEDICINE

## 2025-07-08 PROCEDURE — 83036 HEMOGLOBIN GLYCOSYLATED A1C: CPT | Performed by: INTERNAL MEDICINE

## 2025-07-08 PROCEDURE — 3051F HG A1C>EQUAL 7.0%<8.0%: CPT | Performed by: INTERNAL MEDICINE

## 2025-07-08 PROCEDURE — 3075F SYST BP GE 130 - 139MM HG: CPT | Performed by: INTERNAL MEDICINE

## 2025-07-08 PROCEDURE — 1125F AMNT PAIN NOTED PAIN PRSNT: CPT | Performed by: INTERNAL MEDICINE

## 2025-07-08 PROCEDURE — 82728 ASSAY OF FERRITIN: CPT | Performed by: INTERNAL MEDICINE

## 2025-07-08 PROCEDURE — 80053 COMPREHEN METABOLIC PANEL: CPT | Performed by: INTERNAL MEDICINE

## 2025-07-08 PROCEDURE — 84466 ASSAY OF TRANSFERRIN: CPT | Performed by: INTERNAL MEDICINE

## 2025-07-08 PROCEDURE — 85045 AUTOMATED RETICULOCYTE COUNT: CPT | Performed by: INTERNAL MEDICINE

## 2025-07-08 PROCEDURE — 83540 ASSAY OF IRON: CPT | Performed by: INTERNAL MEDICINE

## 2025-07-08 PROCEDURE — 3079F DIAST BP 80-89 MM HG: CPT | Performed by: INTERNAL MEDICINE

## 2025-07-08 RX ORDER — COLESTIPOL HYDROCHLORIDE 1 G/1
1 TABLET ORAL 2 TIMES DAILY
COMMUNITY
Start: 2025-07-02

## 2025-07-08 RX ORDER — MISOPROSTOL 200 UG/1
200 TABLET ORAL 2 TIMES DAILY PRN
COMMUNITY
Start: 2025-06-14

## 2025-07-08 NOTE — PROGRESS NOTES
Chief Complaint   Patient presents with    Follow-up     2 week follow up chronic medical problems       History of Present Illness      The patient presents for a follow-up related to hypertension. The patient reports that she has had no headaches, chest pain, dyspnea, edema, syncope, blurred vision or palpitations. She states that she is taking her medication as prescribed. She is not having medication side effects.    The patient presents for a follow-up related to Type 2 Diabetes Mellitus. She does not check her blood sugars at home. She denies hypoglycemic symptoms. The patient denies polyuria, polydypsia or polyphagia. She reports no symptoms of neuropathy. The patient takes her medication as prescribed. She is not taking insulin. The patient does check her feet daily at home. She denies orthopnea, paroxysmal nocturnal dyspnea or dyspnea on exertion.    The patient presents for a follow-up related to GERD. The patient is on pantoprazole and misoprostol for her gastroesophageal reflux. The medication is taken on a regular basis and gives complete relief of the symptoms. She reports no abdominal pain, belching, diarrhea, dysphagia, early satiety, heartburn, hoarseness, nausea, odynophagia, rectal bleeding, vomiting or weight loss. The GERD has no known aggravating factors.    The patient reports that as relates to the elevated LFT's, she has no jaundice or itching. She denies hepatotoxic medications.    The patient presents for a follow-up related to anemia. There are no reports of blood loss. The patient has no symptoms of a dry cough, a wet cough, wheezing, fever, myalgias, sweats, decreased appetite, chills, fatigue or paresthesias. The patient's energy level is normal.    Medications      Current Outpatient Medications:     amLODIPine (NORVASC) 5 MG tablet, TAKE 1 TABLET BY MOUTH DAILY (Patient taking differently: Take 0.5 tablets by mouth 2 (Two) Times a Day.), Disp: 90 tablet, Rfl: 1    cetirizine (zyrTEC)  10 MG tablet, Take 1 tablet by mouth Daily As Needed., Disp: , Rfl:     colestipol (COLESTID) 1 g tablet, Take 1 tablet by mouth 2 (Two) Times a Day., Disp: , Rfl:     losartan (COZAAR) 25 MG tablet, TAKE 1 TABLET BY MOUTH DAILY, Disp: 30 tablet, Rfl: 2    metoprolol tartrate (LOPRESSOR) 25 MG tablet, TAKE 1 TABLET BY MOUTH 2 TIMES A DAY, Disp: 180 tablet, Rfl: 1    miSOPROStol (CYTOTEC) 200 MCG tablet, Take 1 tablet by mouth 2 (Two) Times a Day As Needed., Disp: , Rfl:     multivitamin with minerals tablet tablet, Take 1 tablet by mouth Daily., Disp: , Rfl:     ondansetron ODT (ZOFRAN-ODT) 8 MG disintegrating tablet, Place 1 tablet on the tongue Every 8 (Eight) Hours As Needed for Nausea or Vomiting., Disp: 30 tablet, Rfl: 0    pantoprazole (PROTONIX) 40 MG EC tablet, TAKE 1 TABLET BY MOUTH TWICE A DAY FOR 30 DAYS THEN RESUME TO TAKE 1 TABLET BY MOUTH DAILY, Disp: 120 tablet, Rfl: 0    Triamcinolone Acetonide (NASACORT) 55 MCG/ACT nasal inhaler, Administer 2 sprays into the nostril(s) as directed by provider Daily As Needed., Disp: , Rfl:     metFORMIN (GLUCOPHAGE) 500 MG tablet, Take 1 tablet by mouth 2 (Two) Times a Day With Meals. (Patient not taking: Reported on 7/8/2025), Disp: 30 tablet, Rfl: 4     Allergies    No Known Allergies    Problem List    Patient Active Problem List   Diagnosis    Essential hypertension    Benign paroxysmal positional vertigo    Gastroesophageal reflux disease with esophagitis without hemorrhage    Chronic allergic rhinitis    Osteopenia of multiple sites    Trigger middle finger of right hand    Bilateral carpal tunnel syndrome    Arthritis of carpometacarpal (CMC) joint of thumb       Medications, Allergies, Problems List and Past History were reviewed and updated.    Physical Examination    /82 (BP Location: Left arm, Patient Position: Sitting, Cuff Size: Adult)   Pulse 64   Temp 98 °F (36.7 °C) (Infrared)   Resp 18   Wt 60.3 kg (133 lb)   LMP  (LMP Unknown) Comment:  LAST PAP 5/2020 BY DR DUARTE  BMI 25.97 kg/m²       HEENT: Facies- Within normal limits. Lids- Normal bilaterally. Conjunctiva- Clear bilaterally. Sclera- Anicteric bilaterally.    Neck: Thyroid- non enlarged, symmetric and has no nodules. No bruits are detected.    Lungs: Auscultation- Clear to auscultation bilaterally. There are no retractions, clubbing or cyanosis. The Expiratory to Inspiratory ratio is equal.    Lymph Nodes: Cervical- no enlarged lymph nodes noted. Clavicular- Deferred. Axillary- Deferred. Inguinal- Deferred.    Cardiovascular: There are no carotid bruits. Heart- Normal Rate with Regular rhythm and no murmurs. There are no gallops. There are no rubs. In the lower extremities there is no edema. The upper extremities do not have edema.    Abdomen: Soft, benign, non-tender with no masses, hernias, organomegaly or scars.      Impression and Assessment    Essential Hypertension.    Type 2 Diabetes Mellitus without complication without insulin usage.    Gastroesophageal Reflux Disease.    Elevated LFTs.    Anemia.    Plan    Gastroesophageal Reflux Disease Plan: She will follow-up with her gastroenterologist. The patient was instructed to continue the current medications. She has an EUS scheduled at .    Elevated LFTs Plan: The patient was instructed to continue the current medications.    Essential Hypertension Plan: The patient was instructed to continue the current medications.    Type 2 Diabetes Mellitus without complication without insulin usage Plan: The patient was instructed to continue the current medications.    Anemia Plan: The patient was instructed to continue the current medications.    Diagnoses and all orders for this visit:    1. Essential hypertension (Primary)  -     CBC & Differential; Future  -     Comprehensive Metabolic Panel; Future  -     POC Urinalysis Dipstick, Automated; Future    2. Type 2 diabetes mellitus without complication, without long-term current use of  insulin  -     Comprehensive Metabolic Panel; Future  -     Hemoglobin A1c; Future    3. Gastroesophageal reflux disease without esophagitis    4. Elevated LFTs  -     Comprehensive Metabolic Panel; Future    5. Anemia, unspecified type  -     CBC & Differential; Future  -     Ferritin; Future  -     Iron Profile w/o Ferritin; Future  -     Folate; Future  -     Vitamin B12; Future  -     Reticulocytes; Future            Return to Office    The patient was instructed to return for follow-up in 1 month. The patient was instructed to return sooner if the condition changes, worsens, or does not resolve.

## 2025-07-09 RX ORDER — LOSARTAN POTASSIUM 25 MG/1
25 TABLET ORAL DAILY
Qty: 30 TABLET | Refills: 2 | OUTPATIENT
Start: 2025-07-09

## 2025-08-02 DIAGNOSIS — I10 ESSENTIAL HYPERTENSION: ICD-10-CM

## 2025-08-04 RX ORDER — TRIAMTERENE AND HYDROCHLOROTHIAZIDE 37.5; 25 MG/1; MG/1
1 CAPSULE ORAL EVERY MORNING
Qty: 90 CAPSULE | Refills: 1 | OUTPATIENT
Start: 2025-08-04

## 2025-08-19 ENCOUNTER — OFFICE VISIT (OUTPATIENT)
Dept: INTERNAL MEDICINE | Facility: CLINIC | Age: 70
End: 2025-08-19
Payer: MEDICARE

## 2025-08-19 VITALS
WEIGHT: 126 LBS | DIASTOLIC BLOOD PRESSURE: 66 MMHG | RESPIRATION RATE: 14 BRPM | HEART RATE: 56 BPM | TEMPERATURE: 98 F | BODY MASS INDEX: 24.61 KG/M2 | SYSTOLIC BLOOD PRESSURE: 128 MMHG

## 2025-08-19 DIAGNOSIS — R19.7 DIARRHEA, UNSPECIFIED TYPE: Primary | ICD-10-CM

## 2025-08-19 PROCEDURE — 3074F SYST BP LT 130 MM HG: CPT | Performed by: INTERNAL MEDICINE

## 2025-08-19 PROCEDURE — 99213 OFFICE O/P EST LOW 20 MIN: CPT | Performed by: INTERNAL MEDICINE

## 2025-08-19 PROCEDURE — G2211 COMPLEX E/M VISIT ADD ON: HCPCS | Performed by: INTERNAL MEDICINE

## 2025-08-19 PROCEDURE — 3078F DIAST BP <80 MM HG: CPT | Performed by: INTERNAL MEDICINE

## 2025-08-19 PROCEDURE — 1125F AMNT PAIN NOTED PAIN PRSNT: CPT | Performed by: INTERNAL MEDICINE

## 2025-08-19 PROCEDURE — 3044F HG A1C LEVEL LT 7.0%: CPT | Performed by: INTERNAL MEDICINE

## (undated) DEVICE — SYR LUERLOK 30CC

## (undated) DEVICE — ENDOCUT SCISSOR TIP, DISPOSABLE: Brand: RENEW

## (undated) DEVICE — SOL IRR H2O BTL 1000ML STRL

## (undated) DEVICE — SUT VIC 0 UR6 27IN VCP603H

## (undated) DEVICE — RETRIEVAL BALLOON CATHETER: Brand: EXTRACTOR™ PRO RX

## (undated) DEVICE — SUT MNCRYL PLS ANTIB UD 4/0 PS2 18IN

## (undated) DEVICE — ENDOPATH XCEL BLADELESS TROCARS WITH STABILITY SLEEVES: Brand: ENDOPATH XCEL

## (undated) DEVICE — ERBE NESSY®PLATE 170 SPLIT; 168CM²; CABLE 3M: Brand: ERBE

## (undated) DEVICE — [HIGH FLOW INSUFFLATOR,  DO NOT USE IF PACKAGE IS DAMAGED,  KEEP DRY,  KEEP AWAY FROM SUNLIGHT,  PROTECT FROM HEAT AND RADIOACTIVE SOURCES.]: Brand: PNEUMOSURE

## (undated) DEVICE — SPNG ENDO BEDSIDE TUB ENZYM

## (undated) DEVICE — ENDOPOUCH RETRIEVER SPECIMEN RETRIEVAL BAGS: Brand: ENDOPOUCH RETRIEVER

## (undated) DEVICE — ST EXT IV TBG W SECUR LK 20IN

## (undated) DEVICE — GLV SURG SENSICARE PI MIC PF SZ8.5 LF STRL

## (undated) DEVICE — DEV LK WIREGUIDE FUSN OLYMP SCP

## (undated) DEVICE — INTRO ACCSR BLNT TP

## (undated) DEVICE — ENDOPATH XCEL UNIVERSAL TROCAR STABLILITY SLEEVES: Brand: ENDOPATH XCEL

## (undated) DEVICE — SYR LUERLOK 20CC BX/50

## (undated) DEVICE — SPHINCTEROTOME: Brand: DREAMTOME™ RX 44

## (undated) DEVICE — SYR LUERLOK 50ML

## (undated) DEVICE — KT ORCA ORCAPOD DISP STRL

## (undated) DEVICE — CATH CHOLANG 4.5F18IN BRGNDY

## (undated) DEVICE — LAPAROSCOPIC SMOKE FILTRATION SYSTEM: Brand: PALL LAPAROSHIELD® PLUS LAPAROSCOPIC SMOKE FILTRATION SYSTEM

## (undated) DEVICE — Device

## (undated) DEVICE — SUT SILK 2/0 TIES 18IN A185H

## (undated) DEVICE — GLV SURG SENSICARE PI MIC PF SZ8 LF STRL

## (undated) DEVICE — SNAP KOVER: Brand: UNBRANDED

## (undated) DEVICE — THE BITE BLOCK MAXI, LATEX FREE STRAP IS USED TO PROTECT THE ENDOSCOPE INSERTION TUBE FROM BEING BITTEN BY THE PATIENT.

## (undated) DEVICE — PATIENT RETURN ELECTRODE, SINGLE-USE, CONTACT QUALITY MONITORING, ADULT, WITH 9FT CORD, FOR PATIENTS WEIGING OVER 33LBS. (15KG): Brand: MEGADYNE

## (undated) DEVICE — PK LAP LASR CHOLE 10

## (undated) DEVICE — FEEDING TUBE: Brand: ARGYLE

## (undated) DEVICE — BOWL PLSTC MD 16OZ BLU STRL

## (undated) DEVICE — HYBRID CO2 TUBING/CAP SET FOR OLYMPUS® SCOPES & CO2 SOURCE: Brand: ERBE

## (undated) DEVICE — SAFELINER SUCTION CANISTER 1000CC: Brand: DEROYAL

## (undated) DEVICE — GOWN,PREVENTION PLUS,XXLARGE,STERILE: Brand: MEDLINE

## (undated) DEVICE — STPCK 4WY ON/OFF VLV M/COLAR FIT 45PSI STRL

## (undated) DEVICE — 30977 SEE SHARP - ENHANCED INTRAOPERATIVE LAPAROSCOPE CLEANING & DEFOGGING: Brand: 30977 SEE SHARP - ENHANCED INTRAOPERATIVE LAPAROSCOPE CLEANING & DEFOGGING

## (undated) DEVICE — SYR CONTRL LUERLOK 10CC

## (undated) DEVICE — TUBING, SUCTION, 1/4" X 10', STRAIGHT: Brand: MEDLINE